# Patient Record
Sex: MALE | Race: WHITE | Employment: OTHER | URBAN - METROPOLITAN AREA
[De-identification: names, ages, dates, MRNs, and addresses within clinical notes are randomized per-mention and may not be internally consistent; named-entity substitution may affect disease eponyms.]

---

## 2017-05-16 ENCOUNTER — OFFICE VISIT (OUTPATIENT)
Dept: INTERNAL MEDICINE CLINIC | Facility: CLINIC | Age: 82
End: 2017-05-16

## 2017-05-16 VITALS
WEIGHT: 169 LBS | SYSTOLIC BLOOD PRESSURE: 125 MMHG | RESPIRATION RATE: 16 BRPM | DIASTOLIC BLOOD PRESSURE: 77 MMHG | BODY MASS INDEX: 23.66 KG/M2 | HEIGHT: 71 IN | HEART RATE: 61 BPM

## 2017-05-16 DIAGNOSIS — E78.2 MIXED HYPERLIPIDEMIA: ICD-10-CM

## 2017-05-16 DIAGNOSIS — E03.9 ACQUIRED HYPOTHYROIDISM: ICD-10-CM

## 2017-05-16 DIAGNOSIS — I10 ESSENTIAL HYPERTENSION WITH GOAL BLOOD PRESSURE LESS THAN 140/90: Primary | ICD-10-CM

## 2017-05-16 PROCEDURE — 99214 OFFICE O/P EST MOD 30 MIN: CPT | Performed by: INTERNAL MEDICINE

## 2017-05-16 PROCEDURE — G0463 HOSPITAL OUTPT CLINIC VISIT: HCPCS | Performed by: INTERNAL MEDICINE

## 2017-05-16 NOTE — PROGRESS NOTES
Lorrie Patel. is a 80year old male. HPI:   1. Essential hypertension with goal <140/90    Patient has been following low salt diet and has been taking anti-hypertensive prescriptions as prescribed.  Blood pressure has been checked and is under good con Pulse 61  Resp 16  Ht 5' 11\" (1.803 m)  Wt 169 lb (76.658 kg)  BMI 23.58 kg/m2  GENERAL: well developed, well nourished,in no apparent distress  SKIN: no rashes,no suspicious lesions/ has seborrheic keratosis  HEENT: atraumatic, normocephalic, throat is c

## 2017-08-29 ENCOUNTER — MED REC SCAN ONLY (OUTPATIENT)
Dept: INTERNAL MEDICINE CLINIC | Facility: CLINIC | Age: 82
End: 2017-08-29

## 2017-09-19 ENCOUNTER — OFFICE VISIT (OUTPATIENT)
Dept: INTERNAL MEDICINE CLINIC | Facility: CLINIC | Age: 82
End: 2017-09-19

## 2017-09-19 VITALS
HEIGHT: 71 IN | RESPIRATION RATE: 16 BRPM | BODY MASS INDEX: 23.66 KG/M2 | DIASTOLIC BLOOD PRESSURE: 79 MMHG | WEIGHT: 169 LBS | SYSTOLIC BLOOD PRESSURE: 125 MMHG | HEART RATE: 59 BPM

## 2017-09-19 DIAGNOSIS — I10 ESSENTIAL HYPERTENSION WITH GOAL BLOOD PRESSURE LESS THAN 140/90: Primary | ICD-10-CM

## 2017-09-19 DIAGNOSIS — E78.2 MIXED HYPERLIPIDEMIA: ICD-10-CM

## 2017-09-19 DIAGNOSIS — Z23 NEED FOR VACCINATION: ICD-10-CM

## 2017-09-19 DIAGNOSIS — E03.9 ACQUIRED HYPOTHYROIDISM: ICD-10-CM

## 2017-09-19 PROCEDURE — 90653 IIV ADJUVANT VACCINE IM: CPT | Performed by: INTERNAL MEDICINE

## 2017-09-19 PROCEDURE — G0463 HOSPITAL OUTPT CLINIC VISIT: HCPCS | Performed by: INTERNAL MEDICINE

## 2017-09-19 PROCEDURE — G0008 ADMIN INFLUENZA VIRUS VAC: HCPCS | Performed by: INTERNAL MEDICINE

## 2017-09-19 PROCEDURE — 99214 OFFICE O/P EST MOD 30 MIN: CPT | Performed by: INTERNAL MEDICINE

## 2017-09-19 NOTE — PROGRESS NOTES
Bostonbetty Renner. is a 80year old male. HPI:   1. Essential hypertension with goal <140/90    Patient has been following low salt diet and has been taking anti-hypertensive prescriptions as prescribed.  Blood pressure has been checked and is under good con headaches    EXAM:   /79 (BP Location: Right arm, Patient Position: Sitting, Cuff Size: large)   Pulse 59   Resp 16   Ht 5' 11\" (1.803 m)   Wt 169 lb (76.7 kg)   BMI 23.57 kg/m²   GENERAL: well developed, well nourished,in no apparent distress  SKIN

## 2017-09-22 ENCOUNTER — LAB ENCOUNTER (OUTPATIENT)
Dept: LAB | Facility: HOSPITAL | Age: 82
End: 2017-09-22
Attending: INTERNAL MEDICINE
Payer: MEDICARE

## 2017-09-22 DIAGNOSIS — E03.9 ACQUIRED HYPOTHYROIDISM: ICD-10-CM

## 2017-09-22 DIAGNOSIS — I10 ESSENTIAL HYPERTENSION WITH GOAL BLOOD PRESSURE LESS THAN 140/90: ICD-10-CM

## 2017-09-22 DIAGNOSIS — E78.2 MIXED HYPERLIPIDEMIA: ICD-10-CM

## 2017-09-22 LAB
ALBUMIN SERPL BCP-MCNC: 3.5 G/DL (ref 3.5–4.8)
ALBUMIN/GLOB SERPL: 1.1 {RATIO} (ref 1–2)
ALP SERPL-CCNC: 63 U/L (ref 32–100)
ALT SERPL-CCNC: 17 U/L (ref 17–63)
ANION GAP SERPL CALC-SCNC: 6 MMOL/L (ref 0–18)
AST SERPL-CCNC: 25 U/L (ref 15–41)
BACTERIA UR QL AUTO: NEGATIVE /HPF
BASOPHILS # BLD: 0 K/UL (ref 0–0.2)
BASOPHILS NFR BLD: 1 %
BILIRUB SERPL-MCNC: 1.1 MG/DL (ref 0.3–1.2)
BILIRUB UR QL: NEGATIVE
BUN SERPL-MCNC: 27 MG/DL (ref 8–20)
BUN/CREAT SERPL: 19 (ref 10–20)
CALCIUM SERPL-MCNC: 9 MG/DL (ref 8.5–10.5)
CHLORIDE SERPL-SCNC: 105 MMOL/L (ref 95–110)
CHOLEST SERPL-MCNC: 130 MG/DL (ref 110–200)
CLARITY UR: CLEAR
CO2 SERPL-SCNC: 28 MMOL/L (ref 22–32)
COLOR UR: YELLOW
CREAT SERPL-MCNC: 1.42 MG/DL (ref 0.5–1.5)
EOSINOPHIL # BLD: 0.4 K/UL (ref 0–0.7)
EOSINOPHIL NFR BLD: 6 %
ERYTHROCYTE [DISTWIDTH] IN BLOOD BY AUTOMATED COUNT: 13.7 % (ref 11–15)
GLOBULIN PLAS-MCNC: 3.1 G/DL (ref 2.5–3.7)
GLUCOSE SERPL-MCNC: 93 MG/DL (ref 70–99)
GLUCOSE UR-MCNC: NEGATIVE MG/DL
HCT VFR BLD AUTO: 37.5 % (ref 41–52)
HDLC SERPL-MCNC: 57 MG/DL
HGB BLD-MCNC: 12.6 G/DL (ref 13.5–17.5)
KETONES UR-MCNC: NEGATIVE MG/DL
LDLC SERPL CALC-MCNC: 63 MG/DL (ref 0–99)
LEUKOCYTE ESTERASE UR QL STRIP.AUTO: NEGATIVE
LYMPHOCYTES # BLD: 1 K/UL (ref 1–4)
LYMPHOCYTES NFR BLD: 13 %
MCH RBC QN AUTO: 30.6 PG (ref 27–32)
MCHC RBC AUTO-ENTMCNC: 33.7 G/DL (ref 32–37)
MCV RBC AUTO: 90.8 FL (ref 80–100)
MONOCYTES # BLD: 0.7 K/UL (ref 0–1)
MONOCYTES NFR BLD: 10 %
NEUTROPHILS # BLD AUTO: 5 K/UL (ref 1.8–7.7)
NEUTROPHILS NFR BLD: 70 %
NITRITE UR QL STRIP.AUTO: NEGATIVE
NONHDLC SERPL-MCNC: 73 MG/DL
OSMOLALITY UR CALC.SUM OF ELEC: 293 MOSM/KG (ref 275–295)
PH UR: 6 [PH] (ref 5–8)
PLATELET # BLD AUTO: 223 K/UL (ref 140–400)
PMV BLD AUTO: 7.7 FL (ref 7.4–10.3)
POTASSIUM SERPL-SCNC: 4 MMOL/L (ref 3.3–5.1)
PROT SERPL-MCNC: 6.6 G/DL (ref 5.9–8.4)
PROT UR-MCNC: NEGATIVE MG/DL
RBC # BLD AUTO: 4.13 M/UL (ref 4.5–5.9)
RBC #/AREA URNS AUTO: 5 /HPF
SODIUM SERPL-SCNC: 139 MMOL/L (ref 136–144)
SP GR UR STRIP: 1.01 (ref 1–1.03)
TRIGL SERPL-MCNC: 52 MG/DL (ref 1–149)
TSH SERPL-ACNC: 3.05 UIU/ML (ref 0.45–5.33)
UROBILINOGEN UR STRIP-ACNC: <2
VIT C UR-MCNC: NEGATIVE MG/DL
WBC # BLD AUTO: 7.2 K/UL (ref 4–11)
WBC #/AREA URNS AUTO: <1 /HPF

## 2017-09-22 PROCEDURE — 85025 COMPLETE CBC W/AUTO DIFF WBC: CPT

## 2017-09-22 PROCEDURE — 80061 LIPID PANEL: CPT

## 2017-09-22 PROCEDURE — 84443 ASSAY THYROID STIM HORMONE: CPT

## 2017-09-22 PROCEDURE — 81001 URINALYSIS AUTO W/SCOPE: CPT

## 2017-09-22 PROCEDURE — 36415 COLL VENOUS BLD VENIPUNCTURE: CPT

## 2017-09-22 PROCEDURE — 80053 COMPREHEN METABOLIC PANEL: CPT

## 2017-09-25 ENCOUNTER — HOSPITAL ENCOUNTER (OUTPATIENT)
Age: 82
Discharge: HOME | End: 2017-09-25
Attending: FAMILY MEDICINE
Payer: MEDICARE

## 2017-09-25 ENCOUNTER — APPOINTMENT (OUTPATIENT)
Dept: GENERAL RADIOLOGY | Age: 82
End: 2017-09-25
Attending: FAMILY MEDICINE
Payer: MEDICARE

## 2017-09-25 VITALS
HEART RATE: 63 BPM | DIASTOLIC BLOOD PRESSURE: 83 MMHG | SYSTOLIC BLOOD PRESSURE: 152 MMHG | TEMPERATURE: 98 F | RESPIRATION RATE: 16 BRPM | OXYGEN SATURATION: 99 % | BODY MASS INDEX: 22 KG/M2 | WEIGHT: 160 LBS

## 2017-09-25 DIAGNOSIS — J30.9 ACUTE ALLERGIC RHINITIS, UNSPECIFIED SEASONALITY, UNSPECIFIED TRIGGER: Primary | ICD-10-CM

## 2017-09-25 PROCEDURE — 99204 OFFICE O/P NEW MOD 45 MIN: CPT

## 2017-09-25 PROCEDURE — 71020 XR CHEST PA + LAT CHEST (CPT=71020): CPT | Performed by: FAMILY MEDICINE

## 2017-09-25 PROCEDURE — 99213 OFFICE O/P EST LOW 20 MIN: CPT

## 2017-09-25 RX ORDER — MONTELUKAST SODIUM 10 MG/1
10 TABLET ORAL NIGHTLY
Qty: 30 TABLET | Refills: 0 | Status: SHIPPED | OUTPATIENT
Start: 2017-09-25 | End: 2017-10-25

## 2017-09-25 NOTE — ED INITIAL ASSESSMENT (HPI)
Cough x 12 days. Was taking OTC cough medicine with some relief. +white phlegm. Denies fever, chills, body aches, shortness of breath or chest pain.

## 2017-09-25 NOTE — ED PROVIDER NOTES
Patient Seen in: [facility] Immediate Care In [address] [place] [address]    History   Patient presents with:  Cough/URI    Stated Complaint: cough    HPI  Pt is an 81 yo who presents with some post nasal drip and clearing of his throat for the past few days.  No fev ear normal.   Left Ear: External ear normal.   Nose: Nose normal.   Mouth/Throat: Oropharynx is clear and moist.   Eyes: Conjunctivae and EOM are normal. Pupils are equal, round, and reactive to light. Neck: Normal range of motion. Neck supple.    Cardiov

## 2017-10-09 DIAGNOSIS — R79.89 ABNORMAL CBC: Primary | ICD-10-CM

## 2017-10-09 DIAGNOSIS — R82.90 ABNORMAL URINALYSIS: ICD-10-CM

## 2017-10-17 RX ORDER — ATORVASTATIN CALCIUM 10 MG/1
10 TABLET, FILM COATED ORAL DAILY
Qty: 90 TABLET | Refills: 1 | Status: SHIPPED | OUTPATIENT
Start: 2017-10-17 | End: 2018-04-11

## 2017-10-17 RX ORDER — AMLODIPINE BESYLATE 5 MG/1
5 TABLET ORAL DAILY
Qty: 90 TABLET | Refills: 1 | Status: SHIPPED | OUTPATIENT
Start: 2017-10-17 | End: 2018-01-24

## 2017-10-25 ENCOUNTER — LAB ENCOUNTER (OUTPATIENT)
Dept: LAB | Facility: HOSPITAL | Age: 82
End: 2017-10-25
Attending: INTERNAL MEDICINE
Payer: MEDICARE

## 2017-10-25 DIAGNOSIS — R82.90 ABNORMAL URINALYSIS: ICD-10-CM

## 2017-10-25 DIAGNOSIS — R79.89 ABNORMAL CBC: ICD-10-CM

## 2017-10-25 PROCEDURE — 81001 URINALYSIS AUTO W/SCOPE: CPT

## 2017-10-25 PROCEDURE — 85025 COMPLETE CBC W/AUTO DIFF WBC: CPT

## 2017-10-25 PROCEDURE — 36415 COLL VENOUS BLD VENIPUNCTURE: CPT

## 2017-10-27 RX ORDER — MONTELUKAST SODIUM 10 MG/1
TABLET ORAL
Qty: 30 TABLET | Refills: 0 | Status: SHIPPED | OUTPATIENT
Start: 2017-10-27 | End: 2018-10-03

## 2017-11-02 ENCOUNTER — TELEPHONE (OUTPATIENT)
Dept: OTHER | Age: 82
End: 2017-11-02

## 2017-11-02 NOTE — TELEPHONE ENCOUNTER
Status:  Final result   Visible to patient:  Yes (MyChart) Dx:  Abnormal urinalysis   Notes Recorded by Bennett Plaza MD on 10/31/2017 at 2:15 PM CDT  Mild anemia persists. Urine still has a few extra red cells in it.  Call and ask the patient 2 thin

## 2017-11-06 NOTE — TELEPHONE ENCOUNTER
Reviewed doctor's recommendations with pt, pt agreed with doctor's recommendations and will call back if he has any further questions.

## 2017-11-28 RX ORDER — LEVOTHYROXINE SODIUM 0.1 MG/1
100 TABLET ORAL
Qty: 90 TABLET | Refills: 0 | Status: SHIPPED | OUTPATIENT
Start: 2017-11-28 | End: 2018-02-24

## 2017-11-28 NOTE — TELEPHONE ENCOUNTER
Refilled per protocol   Hypothyroid Medications  Protocol Criteria:  Appointment scheduled in the past 12 months or the next 3 months  TSH resulted in the past 12 months that is normal  Recent Outpatient Visits            2 months ago Essential hypertensio

## 2018-01-10 ENCOUNTER — APPOINTMENT (OUTPATIENT)
Dept: GENERAL RADIOLOGY | Age: 83
End: 2018-01-10
Attending: EMERGENCY MEDICINE
Payer: MEDICARE

## 2018-01-10 ENCOUNTER — HOSPITAL ENCOUNTER (OUTPATIENT)
Age: 83
Discharge: HOME | End: 2018-01-10
Attending: EMERGENCY MEDICINE
Payer: MEDICARE

## 2018-01-10 VITALS
OXYGEN SATURATION: 99 % | HEART RATE: 66 BPM | TEMPERATURE: 97 F | DIASTOLIC BLOOD PRESSURE: 75 MMHG | RESPIRATION RATE: 16 BRPM | BODY MASS INDEX: 22 KG/M2 | SYSTOLIC BLOOD PRESSURE: 128 MMHG | WEIGHT: 160 LBS

## 2018-01-10 DIAGNOSIS — J11.1 INFLUENZA: Primary | ICD-10-CM

## 2018-01-10 LAB
FLUAV + FLUBV RNA SPEC NAA+PROBE: NEGATIVE
FLUAV + FLUBV RNA SPEC NAA+PROBE: NEGATIVE
FLUAV + FLUBV RNA SPEC NAA+PROBE: POSITIVE

## 2018-01-10 PROCEDURE — 87631 RESP VIRUS 3-5 TARGETS: CPT | Performed by: EMERGENCY MEDICINE

## 2018-01-10 PROCEDURE — 99213 OFFICE O/P EST LOW 20 MIN: CPT

## 2018-01-10 PROCEDURE — 99214 OFFICE O/P EST MOD 30 MIN: CPT

## 2018-01-10 PROCEDURE — 71046 X-RAY EXAM CHEST 2 VIEWS: CPT | Performed by: EMERGENCY MEDICINE

## 2018-01-10 RX ORDER — ALBUTEROL SULFATE 90 UG/1
2 AEROSOL, METERED RESPIRATORY (INHALATION) EVERY 4 HOURS PRN
Qty: 1 INHALER | Refills: 0 | Status: SHIPPED | OUTPATIENT
Start: 2018-01-10 | End: 2018-10-03

## 2018-01-10 RX ORDER — OSELTAMIVIR PHOSPHATE 75 MG/1
75 CAPSULE ORAL 2 TIMES DAILY
Qty: 10 CAPSULE | Refills: 0 | Status: SHIPPED | OUTPATIENT
Start: 2018-01-10 | End: 2018-01-15

## 2018-01-10 NOTE — ED PROVIDER NOTES
Patient Seen in: [facility] Immediate Care In [address] [place] [address]    History   Patient presents with:  Cough/URI    Stated Complaint: cough    HPI  Patient states he has had cough for 2 days runny nose, scratchy throat and a postnasal drip.   He started with air)    Current:/75   Pulse 66   Temp (!) 97.2 °F (36.2 °C) (Oral)   Resp 16   Wt 72.6 kg   SpO2 99%   BMI 22.32 kg/m²         Physical Exam   Constitutional: He is oriented to person, place, and time. He appears well-developed and well-nourished.  No without hesitation.         Disposition and Plan     Clinical Impression:  Influenza  (primary encounter diagnosis)    Disposition:  Discharge  1/10/2018 10:27 am    Follow-up:  Felicitas Dc MD  [address] [facility]  [phone]

## 2018-01-17 ENCOUNTER — OFFICE VISIT (OUTPATIENT)
Dept: INTERNAL MEDICINE CLINIC | Facility: CLINIC | Age: 83
End: 2018-01-17

## 2018-01-17 VITALS
BODY MASS INDEX: 22 KG/M2 | HEART RATE: 86 BPM | DIASTOLIC BLOOD PRESSURE: 72 MMHG | RESPIRATION RATE: 16 BRPM | WEIGHT: 160 LBS | SYSTOLIC BLOOD PRESSURE: 136 MMHG

## 2018-01-17 DIAGNOSIS — E78.2 MIXED HYPERLIPIDEMIA: ICD-10-CM

## 2018-01-17 DIAGNOSIS — E03.9 ACQUIRED HYPOTHYROIDISM: ICD-10-CM

## 2018-01-17 DIAGNOSIS — J45.41 MODERATE PERSISTENT ASTHMATIC BRONCHITIS WITH ACUTE EXACERBATION: Primary | ICD-10-CM

## 2018-01-17 DIAGNOSIS — I10 ESSENTIAL HYPERTENSION WITH GOAL BLOOD PRESSURE LESS THAN 140/90: ICD-10-CM

## 2018-01-17 PROBLEM — J45.909 ASTHMATIC BRONCHITIS: Status: ACTIVE | Noted: 2018-01-17

## 2018-01-17 PROBLEM — J45.909 ASTHMATIC BRONCHITIS (HCC): Status: ACTIVE | Noted: 2018-01-17

## 2018-01-17 PROCEDURE — 94640 AIRWAY INHALATION TREATMENT: CPT | Performed by: INTERNAL MEDICINE

## 2018-01-17 PROCEDURE — 99214 OFFICE O/P EST MOD 30 MIN: CPT | Performed by: INTERNAL MEDICINE

## 2018-01-17 PROCEDURE — G0463 HOSPITAL OUTPT CLINIC VISIT: HCPCS | Performed by: INTERNAL MEDICINE

## 2018-01-17 RX ORDER — BENZONATATE 100 MG/1
100 CAPSULE ORAL 3 TIMES DAILY PRN
Qty: 30 CAPSULE | Refills: 1 | Status: SHIPPED | OUTPATIENT
Start: 2018-01-17 | End: 2018-10-03

## 2018-01-17 RX ORDER — ALBUTEROL SULFATE 2.5 MG/3ML
2.5 SOLUTION RESPIRATORY (INHALATION) EVERY 4 HOURS PRN
Qty: 30 BOTTLE | Refills: 1 | Status: SHIPPED | OUTPATIENT
Start: 2018-01-17 | End: 2018-10-03

## 2018-01-17 RX ORDER — GUAIFENESIN AND CODEINE PHOSPHATE 100; 10 MG/5ML; MG/5ML
5 SOLUTION ORAL 4 TIMES DAILY PRN
Qty: 180 ML | Refills: 0 | Status: SHIPPED | OUTPATIENT
Start: 2018-01-17 | End: 2018-01-31

## 2018-01-17 RX ORDER — ALBUTEROL SULFATE 2.5 MG/3ML
2.5 SOLUTION RESPIRATORY (INHALATION) ONCE
Status: COMPLETED | OUTPATIENT
Start: 2018-01-17 | End: 2018-01-17

## 2018-01-17 RX ORDER — PREDNISONE 10 MG/1
TABLET ORAL
Qty: 20 TABLET | Refills: 0 | Status: SHIPPED | OUTPATIENT
Start: 2018-01-17 | End: 2018-10-03

## 2018-01-17 RX ADMIN — ALBUTEROL SULFATE 2.5 MG: 2.5 SOLUTION RESPIRATORY (INHALATION) at 14:06:00

## 2018-01-17 NOTE — PROGRESS NOTES
Rickie Wood. is a 80year old male. HPI:     1. 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 Went to urgent care on 1/8/2018 and was given an antibiotic doxycycline and albuterol 2 puffs QID.  St Hypothyroid 2010   • Inguinal hernia    • Palpitations 2001    toprol   • Perforated ear drum    • Prostate cancer ([facility] [address] 2007    seed implant      Social History:  Smoking status: Never Smoker                                                              Sm pressures at home and bring readings to your next office visit to review. 3. Mixed hyperlipidemia    Patient instructed to take cholesterol lowering medications as prescribed and to continue to follow a low fat, low cholesterol diet as discussed.  Discus

## 2018-01-19 ENCOUNTER — PATIENT OUTREACH (OUTPATIENT)
Dept: CASE MANAGEMENT | Age: 83
End: 2018-01-19

## 2018-01-19 NOTE — PROGRESS NOTES
Outreached to patient in regards to enrollment to Chronic Care Management program. Left message for patient to return my call at ext. [phone]. Thank you.

## 2018-01-24 RX ORDER — AMLODIPINE BESYLATE 5 MG/1
5 TABLET ORAL DAILY
Qty: 90 TABLET | Refills: 1 | Status: SHIPPED | OUTPATIENT
Start: 2018-01-24 | End: 2018-10-03

## 2018-02-24 RX ORDER — LEVOTHYROXINE SODIUM 0.1 MG/1
100 TABLET ORAL
Qty: 90 TABLET | Refills: 0 | Status: SHIPPED | OUTPATIENT
Start: 2018-02-24 | End: 2018-05-21

## 2018-04-11 RX ORDER — ATORVASTATIN CALCIUM 10 MG/1
10 TABLET, FILM COATED ORAL DAILY
Qty: 90 TABLET | Refills: 1 | Status: SHIPPED | OUTPATIENT
Start: 2018-04-11 | End: 2018-10-03

## 2018-05-21 RX ORDER — LEVOTHYROXINE SODIUM 0.1 MG/1
100 TABLET ORAL
Qty: 90 TABLET | Refills: 0 | Status: SHIPPED | OUTPATIENT
Start: 2018-05-21 | End: 2018-08-03

## 2018-05-23 ENCOUNTER — TELEPHONE (OUTPATIENT)
Dept: INTERNAL MEDICINE CLINIC | Facility: CLINIC | Age: 83
End: 2018-05-23

## 2018-05-23 DIAGNOSIS — I10 ESSENTIAL HYPERTENSION: Primary | ICD-10-CM

## 2018-05-23 NOTE — TELEPHONE ENCOUNTER
Pt would like to schedule his pneumonia vaccine and any other blood work doctor would like him to have.

## 2018-06-04 ENCOUNTER — LAB ENCOUNTER (OUTPATIENT)
Dept: LAB | Facility: HOSPITAL | Age: 83
End: 2018-06-04
Attending: INTERNAL MEDICINE
Payer: MEDICARE

## 2018-06-04 DIAGNOSIS — I10 ESSENTIAL HYPERTENSION: ICD-10-CM

## 2018-06-04 PROCEDURE — 36415 COLL VENOUS BLD VENIPUNCTURE: CPT

## 2018-06-04 PROCEDURE — 85025 COMPLETE CBC W/AUTO DIFF WBC: CPT

## 2018-06-04 PROCEDURE — 80048 BASIC METABOLIC PNL TOTAL CA: CPT

## 2018-06-26 ENCOUNTER — NURSE ONLY (OUTPATIENT)
Dept: INTERNAL MEDICINE CLINIC | Facility: CLINIC | Age: 83
End: 2018-06-26

## 2018-06-26 DIAGNOSIS — Z23 NEED FOR VACCINATION: Primary | ICD-10-CM

## 2018-06-26 PROCEDURE — 90670 PCV13 VACCINE IM: CPT | Performed by: INTERNAL MEDICINE

## 2018-06-26 PROCEDURE — G0009 ADMIN PNEUMOCOCCAL VACCINE: HCPCS | Performed by: INTERNAL MEDICINE

## 2018-06-26 NOTE — PROGRESS NOTES
Pt presents for PCV 13 injection . Name and  verified . PVC 13 given on left deltoid . Order verified on 18 encounter.

## 2018-07-27 ENCOUNTER — TELEPHONE (OUTPATIENT)
Dept: INTERNAL MEDICINE CLINIC | Facility: CLINIC | Age: 83
End: 2018-07-27

## 2018-07-27 NOTE — TELEPHONE ENCOUNTER
Patient has Pneumonia shot on 06/26/2018 and was advised needed booster in 1 yr but Mychart states in August.       Patient needs to confirm the date.        Please advise

## 2018-08-03 RX ORDER — LEVOTHYROXINE SODIUM 0.1 MG/1
100 TABLET ORAL
Qty: 90 TABLET | Refills: 0 | Status: SHIPPED | OUTPATIENT
Start: 2018-08-03 | End: 2018-10-03

## 2018-08-03 NOTE — TELEPHONE ENCOUNTER
Thyroid Medication Protocol Passed8/3 11:17 AM   TSH in past 12 months    Last TSH value is normal    Appointment in past 12 or next 3 months     Medication refilled for 90 days per protocol.

## 2018-10-03 ENCOUNTER — OFFICE VISIT (OUTPATIENT)
Dept: INTERNAL MEDICINE CLINIC | Facility: CLINIC | Age: 83
End: 2018-10-03
Payer: MEDICARE

## 2018-10-03 VITALS
WEIGHT: 167 LBS | SYSTOLIC BLOOD PRESSURE: 138 MMHG | HEIGHT: 71 IN | HEART RATE: 69 BPM | BODY MASS INDEX: 23.38 KG/M2 | RESPIRATION RATE: 16 BRPM | DIASTOLIC BLOOD PRESSURE: 77 MMHG

## 2018-10-03 DIAGNOSIS — Z23 NEED FOR VACCINATION: ICD-10-CM

## 2018-10-03 DIAGNOSIS — I10 ESSENTIAL HYPERTENSION WITH GOAL BLOOD PRESSURE LESS THAN 140/90: Primary | ICD-10-CM

## 2018-10-03 DIAGNOSIS — E78.2 MIXED HYPERLIPIDEMIA: ICD-10-CM

## 2018-10-03 DIAGNOSIS — E03.9 ACQUIRED HYPOTHYROIDISM: ICD-10-CM

## 2018-10-03 PROCEDURE — G0463 HOSPITAL OUTPT CLINIC VISIT: HCPCS | Performed by: INTERNAL MEDICINE

## 2018-10-03 PROCEDURE — G0008 ADMIN INFLUENZA VIRUS VAC: HCPCS | Performed by: INTERNAL MEDICINE

## 2018-10-03 PROCEDURE — 90653 IIV ADJUVANT VACCINE IM: CPT | Performed by: INTERNAL MEDICINE

## 2018-10-03 PROCEDURE — 99214 OFFICE O/P EST MOD 30 MIN: CPT | Performed by: INTERNAL MEDICINE

## 2018-10-03 RX ORDER — ATORVASTATIN CALCIUM 10 MG/1
10 TABLET, FILM COATED ORAL DAILY
Qty: 90 TABLET | Refills: 3 | Status: SHIPPED | OUTPATIENT
Start: 2018-10-03 | End: 2019-09-24

## 2018-10-03 RX ORDER — LEVOTHYROXINE SODIUM 0.1 MG/1
100 TABLET ORAL
Qty: 90 TABLET | Refills: 3 | Status: SHIPPED | OUTPATIENT
Start: 2018-10-03 | End: 2019-07-09

## 2018-10-03 RX ORDER — AMLODIPINE BESYLATE 5 MG/1
5 TABLET ORAL DAILY
Qty: 90 TABLET | Refills: 3 | Status: ON HOLD | OUTPATIENT
Start: 2018-10-03 | End: 2018-12-17

## 2018-10-03 NOTE — PROGRESS NOTES
Gabrielakelsey Slaughter. is a 80year old male. HPI:   1. Essential hypertension with goal <140/90    Patient has been following low salt diet and has been taking anti-hypertensive prescriptions as prescribed.  Blood pressure has been checked and is under good con denies headaches    EXAM:   /77   Pulse 69   Resp 16   Ht 5' 11\" (1.803 m)   Wt 167 lb (75.8 kg)   BMI 23.29 kg/m²   GENERAL: well developed, well nourished,in no apparent distress  SKIN: no rashes,no suspicious lesions/ has seborrheic keratosis  HE

## 2018-12-17 ENCOUNTER — APPOINTMENT (OUTPATIENT)
Dept: CV DIAGNOSTICS | Facility: HOSPITAL | Age: 83
End: 2018-12-17
Attending: HOSPITALIST
Payer: MEDICARE

## 2018-12-17 ENCOUNTER — HOSPITAL ENCOUNTER (OUTPATIENT)
Facility: HOSPITAL | Age: 83
Setting detail: OBSERVATION
LOS: 1 days | Discharge: HOME | End: 2018-12-17
Attending: EMERGENCY MEDICINE | Admitting: HOSPITALIST
Payer: MEDICARE

## 2018-12-17 VITALS
WEIGHT: 162.69 LBS | DIASTOLIC BLOOD PRESSURE: 88 MMHG | HEIGHT: 71 IN | SYSTOLIC BLOOD PRESSURE: 149 MMHG | TEMPERATURE: 98 F | OXYGEN SATURATION: 98 % | RESPIRATION RATE: 16 BRPM | BODY MASS INDEX: 22.77 KG/M2 | HEART RATE: 65 BPM

## 2018-12-17 DIAGNOSIS — I48.91 ATRIAL FIBRILLATION WITH RAPID VENTRICULAR RESPONSE (HCC): Primary | ICD-10-CM

## 2018-12-17 LAB
ANION GAP SERPL CALC-SCNC: 8 MMOL/L (ref 0–18)
BASOPHILS # BLD: 0 K/UL (ref 0–0.2)
BASOPHILS NFR BLD: 1 %
BUN SERPL-MCNC: 29 MG/DL (ref 8–20)
BUN/CREAT SERPL: 19.1 (ref 10–20)
CALCIUM SERPL-MCNC: 9.9 MG/DL (ref 8.5–10.5)
CHLORIDE SERPL-SCNC: 107 MMOL/L (ref 95–110)
CO2 SERPL-SCNC: 24 MMOL/L (ref 22–32)
CREAT SERPL-MCNC: 1.52 MG/DL (ref 0.5–1.5)
EOSINOPHIL # BLD: 0.5 K/UL (ref 0–0.7)
EOSINOPHIL NFR BLD: 8 %
ERYTHROCYTE [DISTWIDTH] IN BLOOD BY AUTOMATED COUNT: 14.1 % (ref 11–15)
GLUCOSE SERPL-MCNC: 104 MG/DL (ref 70–99)
HCT VFR BLD AUTO: 41.2 % (ref 41–52)
HGB BLD-MCNC: 13.6 G/DL (ref 13.5–17.5)
LYMPHOCYTES # BLD: 1.6 K/UL (ref 1–4)
LYMPHOCYTES NFR BLD: 25 %
MAGNESIUM SERPL-MCNC: 2 MG/DL (ref 1.8–2.5)
MCH RBC QN AUTO: 30.2 PG (ref 27–32)
MCHC RBC AUTO-ENTMCNC: 33 G/DL (ref 32–37)
MCV RBC AUTO: 91.3 FL (ref 80–100)
MONOCYTES # BLD: 0.8 K/UL (ref 0–1)
MONOCYTES NFR BLD: 13 %
NEUTROPHILS # BLD AUTO: 3.5 K/UL (ref 1.8–7.7)
NEUTROPHILS NFR BLD: 54 %
OSMOLALITY UR CALC.SUM OF ELEC: 294 MOSM/KG (ref 275–295)
PLATELET # BLD AUTO: 236 K/UL (ref 140–400)
PMV BLD AUTO: 8.4 FL (ref 7.4–10.3)
POTASSIUM SERPL-SCNC: 3.6 MMOL/L (ref 3.3–5.1)
RBC # BLD AUTO: 4.51 M/UL (ref 4.5–5.9)
SODIUM SERPL-SCNC: 139 MMOL/L (ref 136–144)
T4 FREE SERPL-MCNC: 0.96 NG/DL (ref 0.58–1.64)
TROPONIN I SERPL-MCNC: 0.02 NG/ML (ref ?–0.03)
TSH SERPL-ACNC: 6.75 UIU/ML (ref 0.45–5.33)
WBC # BLD AUTO: 6.4 K/UL (ref 4–11)

## 2018-12-17 PROCEDURE — 99220 INITIAL OBSERVATION CARE,LEVL III: CPT | Performed by: HOSPITALIST

## 2018-12-17 PROCEDURE — 93306 TTE W/DOPPLER COMPLETE: CPT | Performed by: HOSPITALIST

## 2018-12-17 RX ORDER — POTASSIUM CHLORIDE 20 MEQ/1
40 TABLET, EXTENDED RELEASE ORAL ONCE
Status: COMPLETED | OUTPATIENT
Start: 2018-12-17 | End: 2018-12-17

## 2018-12-17 RX ORDER — ATORVASTATIN CALCIUM 10 MG/1
10 TABLET, FILM COATED ORAL NIGHTLY
Status: DISCONTINUED | OUTPATIENT
Start: 2018-12-17 | End: 2018-12-17

## 2018-12-17 RX ORDER — BUPRENORPHINE HCL 8 MG/1
1 TABLET SUBLINGUAL DAILY
COMMUNITY

## 2018-12-17 RX ORDER — NITROGLYCERIN 0.4 MG/1
0.4 TABLET SUBLINGUAL EVERY 5 MIN PRN
Status: DISCONTINUED | OUTPATIENT
Start: 2018-12-17 | End: 2018-12-17

## 2018-12-17 RX ORDER — DOCUSATE SODIUM 100 MG/1
100 CAPSULE, LIQUID FILLED ORAL 2 TIMES DAILY
Status: DISCONTINUED | OUTPATIENT
Start: 2018-12-17 | End: 2018-12-17

## 2018-12-17 RX ORDER — HYDROCODONE BITARTRATE AND ACETAMINOPHEN 5; 325 MG/1; MG/1
2 TABLET ORAL EVERY 4 HOURS PRN
Status: DISCONTINUED | OUTPATIENT
Start: 2018-12-17 | End: 2018-12-17

## 2018-12-17 RX ORDER — HYDROCODONE BITARTRATE AND ACETAMINOPHEN 5; 325 MG/1; MG/1
1 TABLET ORAL EVERY 4 HOURS PRN
Status: DISCONTINUED | OUTPATIENT
Start: 2018-12-17 | End: 2018-12-17

## 2018-12-17 RX ORDER — POLYETHYLENE GLYCOL 3350 17 G/17G
17 POWDER, FOR SOLUTION ORAL DAILY PRN
Status: DISCONTINUED | OUTPATIENT
Start: 2018-12-17 | End: 2018-12-17

## 2018-12-17 RX ORDER — MULTIPLE VITAMINS W/ MINERALS TAB 9MG-400MCG
1 TAB ORAL DAILY
Status: DISCONTINUED | OUTPATIENT
Start: 2018-12-17 | End: 2018-12-17

## 2018-12-17 RX ORDER — SODIUM CHLORIDE 9 MG/ML
INJECTION, SOLUTION INTRAVENOUS ONCE
Status: COMPLETED | OUTPATIENT
Start: 2018-12-17 | End: 2018-12-17

## 2018-12-17 RX ORDER — DILTIAZEM HYDROCHLORIDE 120 MG/1
120 CAPSULE, COATED, EXTENDED RELEASE ORAL DAILY
Qty: 30 CAPSULE | Refills: 1 | Status: SHIPPED | OUTPATIENT
Start: 2018-12-18

## 2018-12-17 RX ORDER — ATORVASTATIN CALCIUM 10 MG/1
10 TABLET, FILM COATED ORAL DAILY
Status: DISCONTINUED | OUTPATIENT
Start: 2018-12-17 | End: 2018-12-17

## 2018-12-17 RX ORDER — ONDANSETRON 2 MG/ML
4 INJECTION INTRAMUSCULAR; INTRAVENOUS EVERY 6 HOURS PRN
Status: DISCONTINUED | OUTPATIENT
Start: 2018-12-17 | End: 2018-12-17

## 2018-12-17 RX ORDER — ACETAMINOPHEN 325 MG/1
650 TABLET ORAL EVERY 4 HOURS PRN
Status: DISCONTINUED | OUTPATIENT
Start: 2018-12-17 | End: 2018-12-17

## 2018-12-17 RX ORDER — DILTIAZEM HYDROCHLORIDE 5 MG/ML
10 INJECTION INTRAVENOUS ONCE
Status: COMPLETED | OUTPATIENT
Start: 2018-12-17 | End: 2018-12-17

## 2018-12-17 RX ORDER — DILTIAZEM HYDROCHLORIDE 120 MG/1
120 CAPSULE, COATED, EXTENDED RELEASE ORAL DAILY
Status: DISCONTINUED | OUTPATIENT
Start: 2018-12-18 | End: 2018-12-17

## 2018-12-17 RX ORDER — ASPIRIN 81 MG/1
81 TABLET, CHEWABLE ORAL NIGHTLY
Status: ON HOLD | COMMUNITY
End: 2018-12-17

## 2018-12-17 RX ORDER — BISACODYL 10 MG
10 SUPPOSITORY, RECTAL RECTAL
Status: DISCONTINUED | OUTPATIENT
Start: 2018-12-17 | End: 2018-12-17

## 2018-12-17 RX ORDER — SODIUM CHLORIDE 0.9 % (FLUSH) 0.9 %
3 SYRINGE (ML) INJECTION AS NEEDED
Status: DISCONTINUED | OUTPATIENT
Start: 2018-12-17 | End: 2018-12-17

## 2018-12-17 RX ORDER — ASPIRIN 81 MG/1
81 TABLET, CHEWABLE ORAL NIGHTLY
Status: DISCONTINUED | OUTPATIENT
Start: 2018-12-17 | End: 2018-12-17

## 2018-12-17 RX ORDER — METOCLOPRAMIDE HYDROCHLORIDE 5 MG/ML
10 INJECTION INTRAMUSCULAR; INTRAVENOUS EVERY 8 HOURS PRN
Status: DISCONTINUED | OUTPATIENT
Start: 2018-12-17 | End: 2018-12-17

## 2018-12-17 RX ORDER — HEPARIN SODIUM 5000 [USP'U]/ML
5000 INJECTION, SOLUTION INTRAVENOUS; SUBCUTANEOUS EVERY 12 HOURS SCHEDULED
Status: DISCONTINUED | OUTPATIENT
Start: 2018-12-17 | End: 2018-12-17

## 2018-12-17 RX ORDER — LEVOTHYROXINE SODIUM 0.1 MG/1
100 TABLET ORAL
Status: DISCONTINUED | OUTPATIENT
Start: 2018-12-17 | End: 2018-12-17

## 2018-12-17 NOTE — H&P
[facility].  Patient Status:  Inpatient    1928 MRN [id number]   Location [facility] 3W/SW Attending Faby Judge MD   Hosp Day # 0 PCP May Caban MD     Date:  2018 ANTIBIOTICS)    Medications Prior to Admission:  aspirin 81 MG Oral Chew Tab Chew 81 mg by mouth nightly. Multiple Vitamins-Minerals (CENTRUM SILVER) Oral Tab Take 1 tablet by mouth daily.    AmLODIPine Besylate 5 MG Oral Tab Take 1 tablet (5 mg total) 1.1 09/22/2017    TP 6.6 09/22/2017    AST 25 09/22/2017    ALT 17 09/22/2017    T4F 0.96 12/17/2018    TSH 6.75 (H) 12/17/2018    MG 2.0 12/17/2018           Ekg 12-lead    Result Date: 12/17/2018  ECG Report  Interpretation  --------------------------

## 2018-12-17 NOTE — ED PROVIDER NOTES
Patient Seen in: [facility] AND [facility] Emergency Department    History   Patient presents with:  Arrythmia/Palpitations (cardiovascular)    Stated Complaint: heart racing     HPI    81 yo M with PMH palpitations on amlodipine, hypothyroid presenting fo [facility] Cardiovascular: Negative for chest pain; (+) palpitations. Gastrointestinal: Negative for vomiting and abdominal pain. Positive for stated complaint: heart racing  Other systems are as noted in HPI. Constitutional and vital signs reviewed.       Al repleted. Brief periods of rate control achieved after IV diltiazem x2 though with RVR recurrence for which gtt initiated and patient to be admitted.  PCP Dr. Drew Box, graciously admitted to Dr. Joel Ward; [facility] Dr. Prashanth Hopkins consulted with recs appreciated and an

## 2018-12-17 NOTE — HISTORICAL OFFICE NOTE
Meagan Bloodgood  : 1928  ACCOUNT:  [id number]  [phone]  PCP: Dr. Lang Bending     TODAY'S DATE: 2018  DICTATED BY:  [Dr. Naren Aquino: [Followup of Syncope.]    HPI:    [On 2018, Luz Marina Johnson, a 80year old male, 23.3 ]    CONS: well developed, well nourished. WEIGHT: BMI parameters reviewed and discussed. HEAD/FACE: no trauma and normocephalic. EYES: conjunctivae not injected and no xanthelasma.  ENT: mucosa pink and moist. NECK: jugular venous pressure not elevate

## 2018-12-17 NOTE — PROGRESS NOTES
Post midnight follow up note. Patient was seen and examined. No complaints at present.       Atrial fibrillation with rapid ventricular response ([address]  -new onset, now in nsr  -monitor on tele  - EKG, reviewed  -plan echocardiogram, pending  -plan cardiolo

## 2018-12-17 NOTE — CONSULTS
[facility] AND [facility]  [facility] Cardiology Consult Note 2018    Becca Harden.  Patient Status:  Inpatient    1928 MRN [id number]   Location [facility] 3W/SW Attending Leonardo Ruano MD   Hosp Day # 0 PCP Suzy Murillo MD     80 y amlodipine with addition of diltiazem - watch bp at home and afib clinic  - titrate diltiazem outpt as needed  - starting low dose with hx of presyncope    Aortic dilation on echo  - noted to have ascending aortic dilation at 4.2cm  -no need to repeat imag

## 2018-12-17 NOTE — CM/SW NOTE
Explanation of of BPCI/Medicare program provided. Patient was enrolled under . BPCI/Medicare letter and brochure provided.   Farheen Magallon RN, CTL

## 2018-12-18 ENCOUNTER — PATIENT OUTREACH (OUTPATIENT)
Dept: CASE MANAGEMENT | Age: 83
End: 2018-12-18

## 2018-12-18 DIAGNOSIS — Z02.9 ENCOUNTERS FOR ADMINISTRATIVE PURPOSE: ICD-10-CM

## 2018-12-18 DIAGNOSIS — I48.91 ATRIAL FIBRILLATION WITH RAPID VENTRICULAR RESPONSE (HCC): ICD-10-CM

## 2018-12-18 PROCEDURE — 1111F DSCHRG MED/CURRENT MED MERGE: CPT

## 2018-12-18 NOTE — PROGRESS NOTES
Attempted to contact the patient for TCM, busy signal x2. NCM will try again at a different time.  TCM BB 12-

## 2018-12-19 ENCOUNTER — TELEPHONE (OUTPATIENT)
Dept: CARDIOLOGY UNIT | Facility: HOSPITAL | Age: 83
End: 2018-12-19

## 2018-12-20 ENCOUNTER — TELEPHONE (OUTPATIENT)
Dept: INTERNAL MEDICINE CLINIC | Facility: CLINIC | Age: 83
End: 2018-12-20

## 2018-12-20 NOTE — TELEPHONE ENCOUNTER
I would think it would be prudent  for him to contact the cardiologist regarding this situation.  since they ordered the meds, treated him and know what happened in the hospital.

## 2018-12-20 NOTE — TELEPHONE ENCOUNTER
Spoke with patient and informed him of Dr. Shantelle Camp's message. Patient was provided with the phone number to Dr. Prosper Hu (cardiology office). Patient voiced understanding and agreed with the plan of care and stated he will call Dr. Oneyda Guerrero.

## 2018-12-20 NOTE — PROGRESS NOTES
Initial Post Discharge Follow Up   Discharge Date: 12/17/18  Contact Date: 12/18/2018    Consent Verification:  Assessment Completed With: Patient  HIPAA Verified?   Yes    Discharge Dx:   AFIB with RVR    General:   • How have you been since your dischar • When you were leaving the hospital were any medication changes discussed with you? yes  • If you were prescribed a new medication:   o Was the new medication’s purpose explained? yes  o Was the new medication’s side effects discussed?  yes  o Do you h and general \"not well\" feeling. [facility] also included an appt request in this TE. NCM reviewed medications and there were no further questions or concerns.      BOOK BY DATE:12/31/2018    [x]  Discharge Summary, Discharge medications reviewed/discussed/and re

## 2018-12-20 NOTE — TELEPHONE ENCOUNTER
Patient dc'd from [facility] 12/17, AFIB w/ RVR. States that his blood pressures were good when he got home on 12/17 and 12/18. Then on 12/19 at night it went up to 172/91 p-63. He then woke up at 5:30 with palpitations and a general \"not well\" feeling.  Took hi

## 2018-12-20 NOTE — TELEPHONE ENCOUNTER
Message was routed to Dr. Armando Parisi for review. Follow-up appointment was scheduled for 12/26/18.      Please reply to pool: PAULO Dorsey

## 2018-12-26 ENCOUNTER — OFFICE VISIT (OUTPATIENT)
Dept: INTERNAL MEDICINE CLINIC | Facility: CLINIC | Age: 83
End: 2018-12-26
Payer: MEDICARE

## 2018-12-26 VITALS
RESPIRATION RATE: 16 BRPM | BODY MASS INDEX: 24 KG/M2 | DIASTOLIC BLOOD PRESSURE: 74 MMHG | WEIGHT: 173 LBS | SYSTOLIC BLOOD PRESSURE: 142 MMHG | HEART RATE: 71 BPM

## 2018-12-26 DIAGNOSIS — E78.2 MIXED HYPERLIPIDEMIA: ICD-10-CM

## 2018-12-26 DIAGNOSIS — I10 ESSENTIAL HYPERTENSION WITH GOAL BLOOD PRESSURE LESS THAN 140/90: ICD-10-CM

## 2018-12-26 DIAGNOSIS — I48.0 PAROXYSMAL ATRIAL FIBRILLATION (HCC): Primary | ICD-10-CM

## 2018-12-26 PROCEDURE — 1111F DSCHRG MED/CURRENT MED MERGE: CPT | Performed by: INTERNAL MEDICINE

## 2018-12-26 PROCEDURE — G0463 HOSPITAL OUTPT CLINIC VISIT: HCPCS | Performed by: INTERNAL MEDICINE

## 2018-12-26 PROCEDURE — 99214 OFFICE O/P EST MOD 30 MIN: CPT | Performed by: INTERNAL MEDICINE

## 2018-12-26 RX ORDER — AMLODIPINE BESYLATE 5 MG/1
5 TABLET ORAL NIGHTLY
COMMUNITY
End: 2019-07-09

## 2018-12-26 NOTE — PROGRESS NOTES
[facility] Zulma Schwartz.  Patient Status:  No patient class for patient encounter    1928 MRN [id number]   Location MD Elzbieta Mills MD Jolaine Lange. is a 80 12/27/2018 09:54 AM    CA 9.4 12/27/2018 09:54 AM    ALB 3.5 09/22/2017 07:28 AM    ALKPHO 63 09/22/2017 07:28 AM    BILT 1.1 09/22/2017 07:28 AM    TP 6.6 09/22/2017 07:28 AM    AST 25 09/22/2017 07:28 AM    ALT 17 09/22/2017 07:28 AM    T4F 0.96 12/17/20 dizziness, lightheadedness or near syncope  -orthostatic blood pressures are normal    At risk for sleep apnea  -consider sleep study testing    HTN  - home blood pressure 119//70's  - bp normal back on amlodipine 5 mg at bedtime      Plan:     Gavin

## 2018-12-26 NOTE — PROGRESS NOTES
Bostonbetty Renner. is a 80year old male. HPI:   1. Atrial Fibrillation    Went into atrial fibrillation and went to ER where medicine was given and he converted to sinus rhythm after several hours.  Had seen Dr Bettie Esteban in the hospital and is on cardizem 120 S 2010    per N minutes in [place]   • Hypothyroid 2010   • Inguinal hernia    • Palpitations 2001    toprol   • Perforated ear drum    • Prostate cancer ([facility] [address] 2007    seed implant      Social History:  Social History    Tobacco Use      Smoking status: low cholesterol diet as discussed. Try to exercise at least 3 times weekly to build strength, burn calories and help to achieve ideal body weight. The patient indicates understanding of these issues and agrees to the plan.     The patient is asked to ret

## 2018-12-27 ENCOUNTER — OFFICE VISIT (OUTPATIENT)
Dept: CARDIOLOGY CLINIC | Facility: HOSPITAL | Age: 83
End: 2018-12-27
Attending: NURSE PRACTITIONER
Payer: MEDICARE

## 2018-12-27 VITALS
OXYGEN SATURATION: 98 % | SYSTOLIC BLOOD PRESSURE: 148 MMHG | WEIGHT: 169 LBS | HEART RATE: 67 BPM | BODY MASS INDEX: 24 KG/M2 | DIASTOLIC BLOOD PRESSURE: 76 MMHG

## 2018-12-27 DIAGNOSIS — Z91.89 AT RISK FOR SLEEP APNEA: ICD-10-CM

## 2018-12-27 DIAGNOSIS — I48.91 ATRIAL FIBRILLATION (HCC): ICD-10-CM

## 2018-12-27 DIAGNOSIS — Z86.79 HISTORY OF ORTHOSTATIC HYPOTENSION: ICD-10-CM

## 2018-12-27 DIAGNOSIS — I48.0 PAROXYSMAL ATRIAL FIBRILLATION (HCC): Primary | ICD-10-CM

## 2018-12-27 DIAGNOSIS — I10 ESSENTIAL HYPERTENSION WITH GOAL BLOOD PRESSURE LESS THAN 140/90: ICD-10-CM

## 2018-12-27 PROCEDURE — 93005 ELECTROCARDIOGRAM TRACING: CPT

## 2018-12-27 PROCEDURE — 99214 OFFICE O/P EST MOD 30 MIN: CPT | Performed by: NURSE PRACTITIONER

## 2018-12-27 PROCEDURE — 93010 ELECTROCARDIOGRAM REPORT: CPT | Performed by: NURSE PRACTITIONER

## 2018-12-27 PROCEDURE — 36415 COLL VENOUS BLD VENIPUNCTURE: CPT | Performed by: NURSE PRACTITIONER

## 2018-12-27 PROCEDURE — 80048 BASIC METABOLIC PNL TOTAL CA: CPT | Performed by: NURSE PRACTITIONER

## 2018-12-27 PROCEDURE — 99212 OFFICE O/P EST SF 10 MIN: CPT | Performed by: NURSE PRACTITIONER

## 2019-01-02 NOTE — PROGRESS NOTES
[facility] Transition Note      Patient Name: Boston Renner.  Patient Status: Outpatient    1928 MRN [id number]   Location [facility] Attending [address], [address] [place] [address]

## 2019-01-04 ENCOUNTER — TELEPHONE (OUTPATIENT)
Dept: CARDIOLOGY CLINIC | Facility: HOSPITAL | Age: 84
End: 2019-01-04

## 2019-01-04 DIAGNOSIS — Z91.89 AT RISK FOR SLEEP APNEA: ICD-10-CM

## 2019-01-04 DIAGNOSIS — R40.0 DAYTIME SLEEPINESS: Primary | ICD-10-CM

## 2019-01-04 DIAGNOSIS — I48.0 PAF (PAROXYSMAL ATRIAL FIBRILLATION) (HCC): ICD-10-CM

## 2019-01-04 NOTE — TELEPHONE ENCOUNTER
Spoke with patient. He is reporting episodes of waking up with elevated heart rate and high blood pressure for the last 2 nights. 2 nights ago blood pressure was in the 170s–180s and heart rates in the 85-90 with heart pounding.   Denied chest pain, shortn

## 2019-02-19 ENCOUNTER — OFFICE VISIT (OUTPATIENT)
Dept: CARDIOLOGY CLINIC | Facility: HOSPITAL | Age: 84
End: 2019-02-19
Attending: INTERNAL MEDICINE
Payer: MEDICARE

## 2019-02-19 VITALS
DIASTOLIC BLOOD PRESSURE: 71 MMHG | HEART RATE: 59 BPM | BODY MASS INDEX: 24 KG/M2 | SYSTOLIC BLOOD PRESSURE: 129 MMHG | OXYGEN SATURATION: 99 % | WEIGHT: 173 LBS

## 2019-02-19 DIAGNOSIS — I48.0 PAROXYSMAL ATRIAL FIBRILLATION (HCC): Primary | ICD-10-CM

## 2019-02-19 DIAGNOSIS — I10 ESSENTIAL HYPERTENSION WITH GOAL BLOOD PRESSURE LESS THAN 140/90: ICD-10-CM

## 2019-02-19 DIAGNOSIS — Z86.79 HISTORY OF ORTHOSTATIC HYPOTENSION: ICD-10-CM

## 2019-02-19 PROBLEM — I48.91 ATRIAL FIBRILLATION WITH RAPID VENTRICULAR RESPONSE (HCC): Status: RESOLVED | Noted: 2018-12-17 | Resolved: 2019-02-19

## 2019-02-19 PROBLEM — Z23 NEED FOR VACCINATION: Status: RESOLVED | Noted: 2018-10-03 | Resolved: 2019-02-19

## 2019-02-19 PROCEDURE — 99212 OFFICE O/P EST SF 10 MIN: CPT | Performed by: NURSE PRACTITIONER

## 2019-02-19 PROCEDURE — 99213 OFFICE O/P EST LOW 20 MIN: CPT | Performed by: NURSE PRACTITIONER

## 2019-02-19 RX ORDER — DOCUSATE SODIUM 100 MG/1
100 CAPSULE, LIQUID FILLED ORAL DAILY
COMMUNITY

## 2019-02-19 NOTE — PROGRESS NOTES
[facility] Dav Vang.  Patient Status:  No patient class for patient encounter    1928 MRN [id number]   [facility] Becca Harden. is a 80 07:28 AM    ALKPHO 63 09/22/2017 07:28 AM    BILT 1.1 09/22/2017 07:28 AM    TP 6.6 09/22/2017 07:28 AM    AST 25 09/22/2017 07:28 AM    ALT 17 09/22/2017 07:28 AM    T4F 0.96 12/17/2018 03:11 AM    TSH 6.75 (H) 12/17/2018 03:11 AM    MG 2.0 12/17/2018 03: Instructions   Continue all your same medications    Call if having any dizziness, lightheadedness, heart racing, palpitations, chest pain, shortness of breath, coughing, swelling, weight gain, fever, chills or worsening symptoms.      Weigh yourself daily

## 2019-03-07 ENCOUNTER — MED REC SCAN ONLY (OUTPATIENT)
Dept: INTERNAL MEDICINE CLINIC | Facility: CLINIC | Age: 84
End: 2019-03-07

## 2019-03-27 ENCOUNTER — OFFICE VISIT (OUTPATIENT)
Dept: INTERNAL MEDICINE CLINIC | Facility: CLINIC | Age: 84
End: 2019-03-27
Payer: MEDICARE

## 2019-03-27 VITALS
HEART RATE: 60 BPM | WEIGHT: 171 LBS | DIASTOLIC BLOOD PRESSURE: 77 MMHG | RESPIRATION RATE: 16 BRPM | HEIGHT: 71 IN | SYSTOLIC BLOOD PRESSURE: 138 MMHG | BODY MASS INDEX: 23.94 KG/M2

## 2019-03-27 DIAGNOSIS — I10 ESSENTIAL HYPERTENSION WITH GOAL BLOOD PRESSURE LESS THAN 140/90: ICD-10-CM

## 2019-03-27 DIAGNOSIS — I48.0 PAROXYSMAL ATRIAL FIBRILLATION (HCC): Primary | ICD-10-CM

## 2019-03-27 DIAGNOSIS — E78.2 MIXED HYPERLIPIDEMIA: ICD-10-CM

## 2019-03-27 DIAGNOSIS — E03.9 ACQUIRED HYPOTHYROIDISM: ICD-10-CM

## 2019-03-27 PROCEDURE — G0463 HOSPITAL OUTPT CLINIC VISIT: HCPCS | Performed by: INTERNAL MEDICINE

## 2019-03-27 PROCEDURE — 99214 OFFICE O/P EST MOD 30 MIN: CPT | Performed by: INTERNAL MEDICINE

## 2019-03-27 NOTE — PROGRESS NOTES
Aga Herzog. is a 80year old male. HPI:     1. Atrial Fibrillation    Went into atrial fibrillation and went to ER where medicine was given and he converted to sinus rhythm after several hours.  Had seen Dr Lucy Stevens in the hospital and is on cardizem 120 tablet Rfl: 3      Past Medical History:   Diagnosis Date   • Global amnesia     per N minutes in [place]   • Hypothyroid    • Inguinal hernia    • Palpitations     toprol   • Perforated ear drum    • [facility]    seed imp history of hypothyroidism and here to recheck. Has been tolerating the medication well. Last TSH was year ago, needs repeat. Denies any shakiness, palpitations, increased BM's or wgt loss. Denies any fatigue, wgt gain.     4. Mixed Hyperlipidemia    Patient

## 2019-07-09 ENCOUNTER — OFFICE VISIT (OUTPATIENT)
Dept: INTERNAL MEDICINE CLINIC | Facility: CLINIC | Age: 84
End: 2019-07-09
Payer: MEDICARE

## 2019-07-09 VITALS
WEIGHT: 168 LBS | HEIGHT: 71 IN | HEART RATE: 70 BPM | DIASTOLIC BLOOD PRESSURE: 70 MMHG | SYSTOLIC BLOOD PRESSURE: 124 MMHG | BODY MASS INDEX: 23.52 KG/M2 | RESPIRATION RATE: 16 BRPM

## 2019-07-09 DIAGNOSIS — E78.2 MIXED HYPERLIPIDEMIA: ICD-10-CM

## 2019-07-09 DIAGNOSIS — I10 ESSENTIAL HYPERTENSION WITH GOAL BLOOD PRESSURE LESS THAN 140/90: ICD-10-CM

## 2019-07-09 DIAGNOSIS — E03.9 ACQUIRED HYPOTHYROIDISM: ICD-10-CM

## 2019-07-09 DIAGNOSIS — Z23 NEED FOR VACCINATION: ICD-10-CM

## 2019-07-09 DIAGNOSIS — I48.0 PAROXYSMAL ATRIAL FIBRILLATION (HCC): Primary | ICD-10-CM

## 2019-07-09 PROCEDURE — G0463 HOSPITAL OUTPT CLINIC VISIT: HCPCS | Performed by: INTERNAL MEDICINE

## 2019-07-09 PROCEDURE — G0009 ADMIN PNEUMOCOCCAL VACCINE: HCPCS | Performed by: INTERNAL MEDICINE

## 2019-07-09 PROCEDURE — 90732 PPSV23 VACC 2 YRS+ SUBQ/IM: CPT | Performed by: INTERNAL MEDICINE

## 2019-07-09 PROCEDURE — 99214 OFFICE O/P EST MOD 30 MIN: CPT | Performed by: INTERNAL MEDICINE

## 2019-07-09 RX ORDER — LEVOTHYROXINE SODIUM 0.1 MG/1
100 TABLET ORAL
Qty: 90 TABLET | Refills: 3 | Status: SHIPPED | OUTPATIENT
Start: 2019-07-09 | End: 2020-08-24

## 2019-07-09 NOTE — PROGRESS NOTES
Lisa Kruger. is a 80year old male. HPI:     1. Atrial Fibrillation    Went into atrial fibrillation and went to ER where medicine was given and he converted to sinus rhythm after several hours.  Had seen Dr Clemens Shone in the hospital and is on cardizem 120 tablet Rfl: 3      Past Medical History:   Diagnosis Date   • Global amnesia     per N minutes in [place]   • Hypothyroid    • Inguinal hernia    • Palpitations     toprol   • Perforated ear drum    • [facility]    seed imp better blood pressure control. 3. Acquired hypothyroidism    Pt had a history of hypothyroidism and here to recheck. Has been tolerating the medication well. Last TSH was year ago. Denies any shakiness, palpitations, increased BM's or wgt loss.  Denies

## 2019-09-10 ENCOUNTER — MED REC SCAN ONLY (OUTPATIENT)
Dept: INTERNAL MEDICINE CLINIC | Facility: CLINIC | Age: 84
End: 2019-09-10

## 2019-09-25 RX ORDER — ATORVASTATIN CALCIUM 10 MG/1
TABLET, FILM COATED ORAL
Qty: 90 TABLET | Refills: 1 | Status: SHIPPED | OUTPATIENT
Start: 2019-09-25 | End: 2020-02-05

## 2019-09-25 NOTE — TELEPHONE ENCOUNTER
Please review; protocol failed.     Requested Prescriptions   Pending Prescriptions Disp Refills   • ATORVASTATIN 10 MG Oral Tab [Pharmacy Med Name: ATORVASTATIN 10 MG TABLET] 90 tablet 3     Sig: TAKE 1 TABLET BY MOUTH EVERY DAY       Cholesterol Medicat

## 2020-01-30 ENCOUNTER — HOSPITAL ENCOUNTER (OUTPATIENT)
Age: 85
Discharge: HOME | End: 2020-01-30
Attending: EMERGENCY MEDICINE
Payer: MEDICARE

## 2020-01-30 ENCOUNTER — APPOINTMENT (OUTPATIENT)
Dept: GENERAL RADIOLOGY | Age: 85
End: 2020-01-30
Attending: EMERGENCY MEDICINE
Payer: MEDICARE

## 2020-01-30 VITALS
RESPIRATION RATE: 18 BRPM | WEIGHT: 160 LBS | SYSTOLIC BLOOD PRESSURE: 135 MMHG | HEIGHT: 71 IN | BODY MASS INDEX: 22.4 KG/M2 | TEMPERATURE: 98 F | DIASTOLIC BLOOD PRESSURE: 64 MMHG | HEART RATE: 73 BPM | OXYGEN SATURATION: 98 %

## 2020-01-30 DIAGNOSIS — J40 BRONCHITIS: Primary | ICD-10-CM

## 2020-01-30 PROCEDURE — 71046 X-RAY EXAM CHEST 2 VIEWS: CPT | Performed by: EMERGENCY MEDICINE

## 2020-01-30 PROCEDURE — 99214 OFFICE O/P EST MOD 30 MIN: CPT

## 2020-01-30 PROCEDURE — 99213 OFFICE O/P EST LOW 20 MIN: CPT

## 2020-01-30 RX ORDER — BENZONATATE 100 MG/1
100 CAPSULE ORAL 3 TIMES DAILY PRN
Qty: 30 CAPSULE | Refills: 0 | Status: SHIPPED | OUTPATIENT
Start: 2020-01-30 | End: 2020-02-29

## 2020-01-30 RX ORDER — PREDNISONE 20 MG/1
40 TABLET ORAL DAILY
Qty: 6 TABLET | Refills: 0 | Status: SHIPPED | OUTPATIENT
Start: 2020-01-30 | End: 2020-02-02

## 2020-01-30 NOTE — ED PROVIDER NOTES
Patient Seen in: [facility] Immediate Care In [address] [place] [address]    History   Patient presents with:  Cough/URI    Stated Complaint: cough    HPI    Patient here with cough, congestion for several days. No travel, no known sick contacts.   Patient denies si use: No      Review of Systems    Positive for stated complaint: cough  Other systems are as noted in HPI. Constitutional and vital signs reviewed. All other systems reviewed and negative except as noted above.     PSFH elements reviewed from today an [id number]  [phone]            Medications Prescribed:  Current Discharge Medication List    START taking these medications    benzonatate 100 MG Oral Cap  Take 1 capsule (100 mg total) by mouth 3 (three) times daily as needed for cough.   Qty: 30 capsule R

## 2020-01-30 NOTE — ED INITIAL ASSESSMENT (HPI)
Cough and cold for 6 weeks using neb machine and feels its not working. No fever. Voice hoarse- yellow productive sputum.

## 2020-01-30 NOTE — ED NOTES
meds as reviewed increase po fluids rest wash hands meds as directed call make appointment for f/u go to the ed for fever shortness of breath chest pain new or worse concerns

## 2020-02-05 ENCOUNTER — OFFICE VISIT (OUTPATIENT)
Dept: INTERNAL MEDICINE CLINIC | Facility: CLINIC | Age: 85
End: 2020-02-05
Payer: MEDICARE

## 2020-02-05 VITALS
DIASTOLIC BLOOD PRESSURE: 81 MMHG | HEART RATE: 62 BPM | WEIGHT: 162 LBS | HEIGHT: 71 IN | RESPIRATION RATE: 16 BRPM | SYSTOLIC BLOOD PRESSURE: 123 MMHG | BODY MASS INDEX: 22.68 KG/M2

## 2020-02-05 DIAGNOSIS — I48.0 PAROXYSMAL ATRIAL FIBRILLATION (HCC): ICD-10-CM

## 2020-02-05 DIAGNOSIS — R05.9 COUGH: Primary | ICD-10-CM

## 2020-02-05 DIAGNOSIS — E03.9 ACQUIRED HYPOTHYROIDISM: ICD-10-CM

## 2020-02-05 DIAGNOSIS — I10 ESSENTIAL HYPERTENSION WITH GOAL BLOOD PRESSURE LESS THAN 140/90: ICD-10-CM

## 2020-02-05 PROCEDURE — G0463 HOSPITAL OUTPT CLINIC VISIT: HCPCS | Performed by: INTERNAL MEDICINE

## 2020-02-05 PROCEDURE — 99214 OFFICE O/P EST MOD 30 MIN: CPT | Performed by: INTERNAL MEDICINE

## 2020-02-05 RX ORDER — ALBUTEROL SULFATE 2.5 MG/3ML
2.5 SOLUTION RESPIRATORY (INHALATION) EVERY 6 HOURS PRN
Qty: 1 BOX | Refills: 0 | Status: SHIPPED | OUTPATIENT
Start: 2020-02-05 | End: 2020-09-24

## 2020-02-05 RX ORDER — ALBUTEROL SULFATE 2.5 MG/3ML
2.5 SOLUTION RESPIRATORY (INHALATION) EVERY 6 HOURS PRN
COMMUNITY
End: 2020-02-05

## 2020-02-05 RX ORDER — ATORVASTATIN CALCIUM 10 MG/1
10 TABLET, FILM COATED ORAL
Qty: 90 TABLET | Refills: 3 | Status: SHIPPED | OUTPATIENT
Start: 2020-02-05 | End: 2021-03-03

## 2020-02-05 NOTE — PROGRESS NOTES
Kenya Thomas. is a 80year old male. HPI:   1. Cough    Has a cough the last 3 weeks. WQent to immediate care, mCXR neg. Given steroids and inhaler. Some better but seems to linger. No fever, chills, headache, body aches.     2. Atrial Fibrillation    W daily. 30 capsule 1   • benzonatate 100 MG Oral Cap Take 1 capsule (100 mg total) by mouth 3 (three) times daily as needed for cough.  (Patient not taking: Reported on 2/5/2020 ) 30 capsule 0      Past Medical History:   Diagnosis Date   • Global amnesia 21 time. Cautioned about excessive bleeding with trauma or lacerations. To follow up with Dr Adams in a few weeks.     3. Essential hypertension with goal blood pressure less than 140/90    Patient instructed to take anti-hypertensive medicines exactly as pres

## 2020-02-05 NOTE — H&P
[facility], [facility]    History and Physical    Hurman Mock.  Patient Status:  Outpatient    1928 MRN [id number]   Location [address] MAIK Menjivar Attending No att. providers found   Hosp Day # 0 PCP Phi 12/27/2018    CO2 27 12/27/2018     (H) 12/27/2018    CA 9.4 12/27/2018    ALB 3.5 09/22/2017    ALKPHO 63 09/22/2017    BILT 1.1 09/22/2017    TP 6.6 09/22/2017    AST 25 09/22/2017    ALT 17 09/22/2017    T4F 0.96 12/17/2018    TSH 6.75 (H) 12/17/

## 2020-03-03 ENCOUNTER — LAB ENCOUNTER (OUTPATIENT)
Dept: LAB | Facility: HOSPITAL | Age: 85
End: 2020-03-03
Attending: INTERNAL MEDICINE
Payer: MEDICARE

## 2020-03-03 DIAGNOSIS — I10 ESSENTIAL HYPERTENSION WITH GOAL BLOOD PRESSURE LESS THAN 140/90: ICD-10-CM

## 2020-03-03 DIAGNOSIS — E03.9 ACQUIRED HYPOTHYROIDISM: ICD-10-CM

## 2020-03-03 LAB
ALBUMIN SERPL-MCNC: 3.7 G/DL (ref 3.4–5)
ALBUMIN/GLOB SERPL: 0.9 {RATIO} (ref 1–2)
ALP LIVER SERPL-CCNC: 70 U/L (ref 45–117)
ALT SERPL-CCNC: 23 U/L (ref 16–61)
ANION GAP SERPL CALC-SCNC: 6 MMOL/L (ref 0–18)
AST SERPL-CCNC: 23 U/L (ref 15–37)
BACTERIA UR QL AUTO: NEGATIVE /HPF
BASOPHILS # BLD AUTO: 0.05 X10(3) UL (ref 0–0.2)
BASOPHILS NFR BLD AUTO: 0.7 %
BILIRUB SERPL-MCNC: 0.9 MG/DL (ref 0.1–2)
BILIRUB UR QL: NEGATIVE
BUN BLD-MCNC: 30 MG/DL (ref 7–18)
BUN/CREAT SERPL: 18.4 (ref 10–20)
CALCIUM BLD-MCNC: 9.6 MG/DL (ref 8.5–10.1)
CHLORIDE SERPL-SCNC: 105 MMOL/L (ref 98–112)
CHOLEST SMN-MCNC: 159 MG/DL (ref ?–200)
CLARITY UR: CLEAR
CO2 SERPL-SCNC: 29 MMOL/L (ref 21–32)
COLOR UR: YELLOW
CREAT BLD-MCNC: 1.63 MG/DL (ref 0.7–1.3)
DEPRECATED RDW RBC AUTO: 47.1 FL (ref 35.1–46.3)
EOSINOPHIL # BLD AUTO: 0.44 X10(3) UL (ref 0–0.7)
EOSINOPHIL NFR BLD AUTO: 5.8 %
ERYTHROCYTE [DISTWIDTH] IN BLOOD BY AUTOMATED COUNT: 13.4 % (ref 11–15)
GLOBULIN PLAS-MCNC: 3.9 G/DL (ref 2.8–4.4)
GLUCOSE BLD-MCNC: 88 MG/DL (ref 70–99)
GLUCOSE UR-MCNC: NEGATIVE MG/DL
HCT VFR BLD AUTO: 40.2 % (ref 39–53)
HDLC SERPL-MCNC: 78 MG/DL (ref 40–59)
HGB BLD-MCNC: 13 G/DL (ref 13–17.5)
IMM GRANULOCYTES # BLD AUTO: 0.02 X10(3) UL (ref 0–1)
IMM GRANULOCYTES NFR BLD: 0.3 %
KETONES UR-MCNC: NEGATIVE MG/DL
LDLC SERPL CALC-MCNC: 68 MG/DL (ref ?–100)
LEUKOCYTE ESTERASE UR QL STRIP.AUTO: NEGATIVE
LYMPHOCYTES # BLD AUTO: 1.44 X10(3) UL (ref 1–4)
LYMPHOCYTES NFR BLD AUTO: 18.9 %
M PROTEIN MFR SERPL ELPH: 7.6 G/DL (ref 6.4–8.2)
MCH RBC QN AUTO: 30.9 PG (ref 26–34)
MCHC RBC AUTO-ENTMCNC: 32.3 G/DL (ref 31–37)
MCV RBC AUTO: 95.5 FL (ref 80–100)
MONOCYTES # BLD AUTO: 0.81 X10(3) UL (ref 0.1–1)
MONOCYTES NFR BLD AUTO: 10.7 %
NEUTROPHILS # BLD AUTO: 4.84 X10 (3) UL (ref 1.5–7.7)
NEUTROPHILS # BLD AUTO: 4.84 X10(3) UL (ref 1.5–7.7)
NEUTROPHILS NFR BLD AUTO: 63.6 %
NITRITE UR QL STRIP.AUTO: NEGATIVE
NONHDLC SERPL-MCNC: 81 MG/DL (ref ?–130)
OSMOLALITY SERPL CALC.SUM OF ELEC: 296 MOSM/KG (ref 275–295)
PATIENT FASTING Y/N/NP: YES
PATIENT FASTING Y/N/NP: YES
PH UR: 6 [PH] (ref 5–8)
PLATELET # BLD AUTO: 254 10(3)UL (ref 150–450)
POTASSIUM SERPL-SCNC: 3.9 MMOL/L (ref 3.5–5.1)
PROT UR-MCNC: NEGATIVE MG/DL
RBC # BLD AUTO: 4.21 X10(6)UL (ref 3.8–5.8)
RBC #/AREA URNS AUTO: 3 /HPF
SODIUM SERPL-SCNC: 140 MMOL/L (ref 136–145)
SP GR UR STRIP: 1.01 (ref 1–1.03)
T4 FREE SERPL-MCNC: 1.2 NG/DL (ref 0.8–1.7)
TRIGL SERPL-MCNC: 64 MG/DL (ref 30–149)
TSI SER-ACNC: 7.78 MIU/ML (ref 0.36–3.74)
UROBILINOGEN UR STRIP-ACNC: <2
VLDLC SERPL CALC-MCNC: 13 MG/DL (ref 0–30)
WBC # BLD AUTO: 7.6 X10(3) UL (ref 4–11)
WBC #/AREA URNS AUTO: <1 /HPF

## 2020-03-03 PROCEDURE — 80061 LIPID PANEL: CPT

## 2020-03-03 PROCEDURE — 84443 ASSAY THYROID STIM HORMONE: CPT

## 2020-03-03 PROCEDURE — 84439 ASSAY OF FREE THYROXINE: CPT

## 2020-03-03 PROCEDURE — 80053 COMPREHEN METABOLIC PANEL: CPT

## 2020-03-03 PROCEDURE — 81001 URINALYSIS AUTO W/SCOPE: CPT

## 2020-03-03 PROCEDURE — 85025 COMPLETE CBC W/AUTO DIFF WBC: CPT

## 2020-03-03 PROCEDURE — 36415 COLL VENOUS BLD VENIPUNCTURE: CPT

## 2020-03-04 ENCOUNTER — NURSE TRIAGE (OUTPATIENT)
Dept: OTHER | Age: 85
End: 2020-03-04

## 2020-03-04 ENCOUNTER — OFFICE VISIT (OUTPATIENT)
Dept: INTERNAL MEDICINE CLINIC | Facility: CLINIC | Age: 85
End: 2020-03-04
Payer: MEDICARE

## 2020-03-04 VITALS
DIASTOLIC BLOOD PRESSURE: 77 MMHG | WEIGHT: 161 LBS | BODY MASS INDEX: 22.54 KG/M2 | HEIGHT: 71 IN | HEART RATE: 82 BPM | SYSTOLIC BLOOD PRESSURE: 129 MMHG

## 2020-03-04 DIAGNOSIS — E03.8 SUBCLINICAL HYPOTHYROIDISM: ICD-10-CM

## 2020-03-04 DIAGNOSIS — I10 ESSENTIAL HYPERTENSION WITH GOAL BLOOD PRESSURE LESS THAN 140/90: Primary | ICD-10-CM

## 2020-03-04 PROCEDURE — 99213 OFFICE O/P EST LOW 20 MIN: CPT | Performed by: PHYSICIAN ASSISTANT

## 2020-03-04 PROCEDURE — G0463 HOSPITAL OUTPT CLINIC VISIT: HCPCS | Performed by: PHYSICIAN ASSISTANT

## 2020-03-04 RX ORDER — LEVOTHYROXINE SODIUM 0.03 MG/1
25 TABLET ORAL
Qty: 90 TABLET | Refills: 0 | Status: SHIPPED | OUTPATIENT
Start: 2020-03-04 | End: 2020-05-11

## 2020-03-04 NOTE — TELEPHONE ENCOUNTER
Action Requested: Summary for Provider     []  Critical Lab, Recommendations Needed  [] Need Additional Advice  []   FYI    []   Need Orders  [] Need Medications Sent to Pharmacy  []  Other     SUMMARY: Pt report Elevated BP today 5:30 am 151/83 Pulse 68,

## 2020-03-04 NOTE — PROGRESS NOTES
HPI:    Patient ID: Padmini Camp. is a 80year old male. HPI  Patient presents to the office for high blood pressure and to discuss TSH level. Currently taking diltiazem, usually has low blood pressure.  Over the past few days noted that it has been h implant     Social History    Tobacco Use      Smoking status: Never Smoker      Smokeless tobacco: Never Used    Alcohol use: No    Past Surgical History:   Procedure Laterality Date   • COLONOSCOPY     • ELECTROCARDIOGRAM, COMPLETE  08-    scanned sooner   No orders of the defined types were placed in this encounter. No follow-ups on file.     Meds This Visit:  Requested Prescriptions     Signed Prescriptions Disp Refills   • Levothyroxine Sodium 25 MCG Oral Tab 90 tablet 0     Sig: Take 1 tablet

## 2020-03-19 ENCOUNTER — TELEPHONE (OUTPATIENT)
Dept: OTHER | Age: 85
End: 2020-03-19

## 2020-03-19 NOTE — TELEPHONE ENCOUNTER
Patient called today as per his last labs completed on 3/3/2020, he was instructed to submit a urine specimen, and have BMP drawn in 2 weeks. Also to have TSH redrawn in 3 months. He is asking if this is a good time to do these tests due to the pandemic.

## 2020-05-11 RX ORDER — LEVOTHYROXINE SODIUM 0.03 MG/1
25 TABLET ORAL
Qty: 90 TABLET | Refills: 0 | Status: SHIPPED | OUTPATIENT
Start: 2020-05-11 | End: 2020-08-21

## 2020-06-15 ENCOUNTER — VIRTUAL PHONE E/M (OUTPATIENT)
Dept: INTERNAL MEDICINE CLINIC | Facility: CLINIC | Age: 85
End: 2020-06-15
Payer: MEDICARE

## 2020-06-15 ENCOUNTER — LAB ENCOUNTER (OUTPATIENT)
Dept: LAB | Facility: HOSPITAL | Age: 85
End: 2020-06-15
Attending: PHYSICIAN ASSISTANT
Payer: MEDICARE

## 2020-06-15 DIAGNOSIS — R94.4 DECREASED GFR: ICD-10-CM

## 2020-06-15 DIAGNOSIS — E03.9 ACQUIRED HYPOTHYROIDISM: ICD-10-CM

## 2020-06-15 DIAGNOSIS — R31.21 ASYMPTOMATIC MICROSCOPIC HEMATURIA: ICD-10-CM

## 2020-06-15 DIAGNOSIS — E03.8 SUBCLINICAL HYPOTHYROIDISM: Primary | ICD-10-CM

## 2020-06-15 PROCEDURE — 99441 PHONE E/M BY PHYS 5-10 MIN: CPT | Performed by: PHYSICIAN ASSISTANT

## 2020-06-15 PROCEDURE — 80048 BASIC METABOLIC PNL TOTAL CA: CPT

## 2020-06-15 PROCEDURE — 81001 URINALYSIS AUTO W/SCOPE: CPT

## 2020-06-15 PROCEDURE — 84439 ASSAY OF FREE THYROXINE: CPT

## 2020-06-15 PROCEDURE — 36415 COLL VENOUS BLD VENIPUNCTURE: CPT

## 2020-06-15 PROCEDURE — 84443 ASSAY THYROID STIM HORMONE: CPT

## 2020-06-15 PROCEDURE — 84481 FREE ASSAY (FT-3): CPT

## 2020-06-15 NOTE — PROGRESS NOTES
Virtual Telephone Check-In    Adrian Silke. verbally consents to a Virtual/Telephone Check-In visit on 06/15/20. Patient has been referred to the [facility] website at [email or web address] to review the yearly Consent to Treat document.     Patient unders

## 2020-07-30 ENCOUNTER — HOSPITAL ENCOUNTER (OUTPATIENT)
Age: 85
Discharge: ANOTHER HOSPITAL | End: 2020-07-30
Payer: MEDICARE

## 2020-07-30 ENCOUNTER — APPOINTMENT (OUTPATIENT)
Dept: CT IMAGING | Facility: HOSPITAL | Age: 85
End: 2020-07-30
Attending: EMERGENCY MEDICINE
Payer: MEDICARE

## 2020-07-30 ENCOUNTER — HOSPITAL ENCOUNTER (EMERGENCY)
Facility: HOSPITAL | Age: 85
Discharge: HOME | End: 2020-07-30
Attending: EMERGENCY MEDICINE
Payer: MEDICARE

## 2020-07-30 VITALS
RESPIRATION RATE: 16 BRPM | HEART RATE: 81 BPM | HEIGHT: 71 IN | BODY MASS INDEX: 22.4 KG/M2 | WEIGHT: 160 LBS | OXYGEN SATURATION: 98 % | DIASTOLIC BLOOD PRESSURE: 82 MMHG | SYSTOLIC BLOOD PRESSURE: 152 MMHG | TEMPERATURE: 98 F

## 2020-07-30 VITALS
RESPIRATION RATE: 18 BRPM | WEIGHT: 162.5 LBS | HEART RATE: 96 BPM | DIASTOLIC BLOOD PRESSURE: 84 MMHG | HEIGHT: 71 IN | SYSTOLIC BLOOD PRESSURE: 141 MMHG | TEMPERATURE: 99 F | OXYGEN SATURATION: 98 % | BODY MASS INDEX: 22.75 KG/M2

## 2020-07-30 DIAGNOSIS — S16.1XXA STRAIN OF NECK MUSCLE, INITIAL ENCOUNTER: Primary | ICD-10-CM

## 2020-07-30 DIAGNOSIS — R51.9 UNILATERAL OCCIPITAL HEADACHE: Primary | ICD-10-CM

## 2020-07-30 DIAGNOSIS — M43.6 NECK STIFFNESS: ICD-10-CM

## 2020-07-30 LAB
ANION GAP SERPL CALC-SCNC: 5 MMOL/L (ref 0–18)
BASOPHILS # BLD AUTO: 0.01 X10(3) UL (ref 0–0.2)
BASOPHILS NFR BLD AUTO: 0.1 %
BUN BLD-MCNC: 25 MG/DL (ref 7–18)
BUN/CREAT SERPL: 17.5 (ref 10–20)
CALCIUM BLD-MCNC: 10.3 MG/DL (ref 8.5–10.1)
CHLORIDE SERPL-SCNC: 106 MMOL/L (ref 98–112)
CO2 SERPL-SCNC: 29 MMOL/L (ref 21–32)
CREAT BLD-MCNC: 1.43 MG/DL (ref 0.7–1.3)
DEPRECATED RDW RBC AUTO: 44.2 FL (ref 35.1–46.3)
EOSINOPHIL # BLD AUTO: 0.09 X10(3) UL (ref 0–0.7)
EOSINOPHIL NFR BLD AUTO: 0.9 %
ERYTHROCYTE [DISTWIDTH] IN BLOOD BY AUTOMATED COUNT: 13.2 % (ref 11–15)
GLUCOSE BLD-MCNC: 90 MG/DL (ref 70–99)
HCT VFR BLD AUTO: 37.4 % (ref 39–53)
HGB BLD-MCNC: 12.2 G/DL (ref 13–17.5)
IMM GRANULOCYTES # BLD AUTO: 0.03 X10(3) UL (ref 0–1)
IMM GRANULOCYTES NFR BLD: 0.3 %
LYMPHOCYTES # BLD AUTO: 0.85 X10(3) UL (ref 1–4)
LYMPHOCYTES NFR BLD AUTO: 8.1 %
MCH RBC QN AUTO: 30.1 PG (ref 26–34)
MCHC RBC AUTO-ENTMCNC: 32.6 G/DL (ref 31–37)
MCV RBC AUTO: 92.3 FL (ref 80–100)
MONOCYTES # BLD AUTO: 1.25 X10(3) UL (ref 0.1–1)
MONOCYTES NFR BLD AUTO: 11.9 %
NEUTROPHILS # BLD AUTO: 8.27 X10 (3) UL (ref 1.5–7.7)
NEUTROPHILS # BLD AUTO: 8.27 X10(3) UL (ref 1.5–7.7)
NEUTROPHILS NFR BLD AUTO: 78.7 %
OSMOLALITY SERPL CALC.SUM OF ELEC: 294 MOSM/KG (ref 275–295)
PLATELET # BLD AUTO: 274 10(3)UL (ref 150–450)
POTASSIUM SERPL-SCNC: 4 MMOL/L (ref 3.5–5.1)
RBC # BLD AUTO: 4.05 X10(6)UL (ref 3.8–5.8)
SODIUM SERPL-SCNC: 140 MMOL/L (ref 136–145)
WBC # BLD AUTO: 10.5 X10(3) UL (ref 4–11)

## 2020-07-30 PROCEDURE — 96361 HYDRATE IV INFUSION ADD-ON: CPT

## 2020-07-30 PROCEDURE — 80048 BASIC METABOLIC PNL TOTAL CA: CPT | Performed by: EMERGENCY MEDICINE

## 2020-07-30 PROCEDURE — 70498 CT ANGIOGRAPHY NECK: CPT | Performed by: EMERGENCY MEDICINE

## 2020-07-30 PROCEDURE — 70450 CT HEAD/BRAIN W/O DYE: CPT | Performed by: EMERGENCY MEDICINE

## 2020-07-30 PROCEDURE — 85025 COMPLETE CBC W/AUTO DIFF WBC: CPT | Performed by: EMERGENCY MEDICINE

## 2020-07-30 PROCEDURE — 99204 OFFICE O/P NEW MOD 45 MIN: CPT | Performed by: EMERGENCY MEDICINE

## 2020-07-30 PROCEDURE — 99285 EMERGENCY DEPT VISIT HI MDM: CPT

## 2020-07-30 PROCEDURE — 96374 THER/PROPH/DIAG INJ IV PUSH: CPT

## 2020-07-30 RX ORDER — ACETAMINOPHEN 500 MG
1000 TABLET ORAL ONCE
Status: COMPLETED | OUTPATIENT
Start: 2020-07-30 | End: 2020-07-30

## 2020-07-30 RX ORDER — SODIUM CHLORIDE 9 MG/ML
INJECTION, SOLUTION INTRAVENOUS CONTINUOUS
Status: DISCONTINUED | OUTPATIENT
Start: 2020-07-30 | End: 2020-07-30

## 2020-07-30 RX ORDER — DIAZEPAM 2 MG/1
1-2 TABLET ORAL 3 TIMES DAILY PRN
Qty: 10 TABLET | Refills: 0 | Status: SHIPPED | OUTPATIENT
Start: 2020-07-30 | End: 2020-08-06

## 2020-07-30 RX ORDER — METOCLOPRAMIDE HYDROCHLORIDE 5 MG/ML
10 INJECTION INTRAMUSCULAR; INTRAVENOUS ONCE
Status: COMPLETED | OUTPATIENT
Start: 2020-07-30 | End: 2020-07-30

## 2020-07-30 NOTE — ED INITIAL ASSESSMENT (HPI)
Pt reports intermittent head and neck pain x one week. Denies recent head trauma. States that today he is unable to turn his head.

## 2020-07-30 NOTE — ED PROVIDER NOTES
Patient Seen in: [facility] CLINICS Immediate Care In [address] [place] [address]      History   Patient presents with:  Headache    Stated Complaint: can't move head    HPI  Patient presents with pain in the back of the head and neck.   He states he has had tightness and pa kg   SpO2 98%   BMI 22.66 kg/m²         Physical Exam  Vitals signs and nursing note reviewed. Constitutional:       General: He is not in acute distress. Appearance: He is well-developed. Comments:  Well appearing   HENT:      Head: Normocephali List

## 2020-07-30 NOTE — ED PROVIDER NOTES
Patient Seen in: [facility] AND [facility] Emergency Department      History   Patient presents with:  Neck Pain    Stated Complaint: neck pain x1.5 weeks    HPI    71-year-old male presents for evaluation of neck pain.   Patient reports over the past 1 to 2 week atraumatic. Eyes:      Conjunctiva/sclera: Conjunctivae normal.   Neck:      Musculoskeletal: Normal range of motion and neck supple.       Comments: Left-sided neck pain with range of motion  Cardiovascular:      Rate and Rhythm: Normal rate and regular RAINBOW DRAW LAVENDER   RAINBOW DRAW LIGHT GREEN   RAINBOW DRAW GOLD          Ct Brain Or Head (52330)    Result Date: 7/30/2020  CONCLUSION:  1. No acute intracranial finding.  2. Mild changes of chronic small vessel disease in cerebral white matter-age of neck muscle, initial encounter  (primary encounter diagnosis)    Plan: Well-appearing patient, with normal neurovascular exam, no acute findings on imaging. Patient is driving and does not want anything stronger than Tylenol.   Suspect torticollis or mu

## 2020-07-31 ENCOUNTER — TELEPHONE (OUTPATIENT)
Dept: INTERNAL MEDICINE CLINIC | Facility: CLINIC | Age: 85
End: 2020-07-31

## 2020-07-31 NOTE — TELEPHONE ENCOUNTER
Patient calling to inform he was seen in [address] [place] [address] ED 7/30 for neck pain and spasms. Was dx with muscle strain of neck, other tests were neg, pt is on blood thinner. They advised Tylenol and Diazepam as needed.  Today patient reports to be doing better, diazepam

## 2020-08-03 ENCOUNTER — TELEMEDICINE (OUTPATIENT)
Dept: INTERNAL MEDICINE CLINIC | Facility: CLINIC | Age: 85
End: 2020-08-03
Payer: MEDICARE

## 2020-08-03 DIAGNOSIS — I10 ESSENTIAL HYPERTENSION WITH GOAL BLOOD PRESSURE LESS THAN 140/90: ICD-10-CM

## 2020-08-03 DIAGNOSIS — S16.1XXD STRAIN OF NECK MUSCLE, SUBSEQUENT ENCOUNTER: Primary | ICD-10-CM

## 2020-08-03 DIAGNOSIS — E03.9 ACQUIRED HYPOTHYROIDISM: ICD-10-CM

## 2020-08-03 PROCEDURE — 99214 OFFICE O/P EST MOD 30 MIN: CPT | Performed by: PHYSICIAN ASSISTANT

## 2020-08-03 NOTE — PROGRESS NOTES
This is a telemedicine visit with live, interactive video and audio. Patient understands and accepts financial responsibility for any deductible, co-insurance and/or co-pays associated with this service.     SUBJECTIVE  Pt presents for a follow up of ne Box 0   • Levothyroxine Sodium 100 MCG Oral Tab Take 1 tablet (100 mcg total) by mouth before breakfast. 90 tablet 3   • docusate sodium 100 MG Oral Cap Take 100 mg by mouth daily.      • Multiple Vitamins-Minerals (CENTRUM SILVER) Oral Tab Take 1 tablet by

## 2020-08-21 NOTE — TELEPHONE ENCOUNTER
Patient requesting refill for medication below and states out of medication and was advised pharmacy submitted request for refill.     •  Levothyroxine Sodium 100 MCG Oral Tab, Take 1 tablet (100 mcg total) by mouth before breakfast., Disp: 90 tablet, Rfl:

## 2020-08-22 RX ORDER — LEVOTHYROXINE SODIUM 0.03 MG/1
25 TABLET ORAL
Qty: 90 TABLET | Refills: 1 | Status: SHIPPED | OUTPATIENT
Start: 2020-08-22 | End: 2020-08-25

## 2020-08-24 RX ORDER — LEVOTHYROXINE SODIUM 0.1 MG/1
100 TABLET ORAL
Qty: 90 TABLET | Refills: 3 | Status: SHIPPED | OUTPATIENT
Start: 2020-08-24 | End: 2021-03-29

## 2020-08-25 RX ORDER — LEVOTHYROXINE SODIUM 0.03 MG/1
25 TABLET ORAL
Qty: 90 TABLET | Refills: 0 | Status: SHIPPED | OUTPATIENT
Start: 2020-08-25 | End: 2021-03-24

## 2020-09-16 ENCOUNTER — LAB ENCOUNTER (OUTPATIENT)
Dept: LAB | Facility: HOSPITAL | Age: 85
End: 2020-09-16
Attending: PHYSICIAN ASSISTANT
Payer: MEDICARE

## 2020-09-16 DIAGNOSIS — R94.4 DECREASED GFR: ICD-10-CM

## 2020-09-16 DIAGNOSIS — E03.8 SUBCLINICAL HYPOTHYROIDISM: ICD-10-CM

## 2020-09-16 LAB
ANION GAP SERPL CALC-SCNC: 4 MMOL/L (ref 0–18)
BUN BLD-MCNC: 32 MG/DL (ref 7–18)
BUN/CREAT SERPL: 19.8 (ref 10–20)
CALCIUM BLD-MCNC: 9.3 MG/DL (ref 8.5–10.1)
CHLORIDE SERPL-SCNC: 107 MMOL/L (ref 98–112)
CO2 SERPL-SCNC: 29 MMOL/L (ref 21–32)
CREAT BLD-MCNC: 1.62 MG/DL (ref 0.7–1.3)
GLUCOSE BLD-MCNC: 85 MG/DL (ref 70–99)
OSMOLALITY SERPL CALC.SUM OF ELEC: 296 MOSM/KG (ref 275–295)
PATIENT FASTING Y/N/NP: YES
POTASSIUM SERPL-SCNC: 4.2 MMOL/L (ref 3.5–5.1)
SODIUM SERPL-SCNC: 140 MMOL/L (ref 136–145)
TSI SER-ACNC: 0.84 MIU/ML (ref 0.36–3.74)

## 2020-09-16 PROCEDURE — 36415 COLL VENOUS BLD VENIPUNCTURE: CPT

## 2020-09-16 PROCEDURE — 80048 BASIC METABOLIC PNL TOTAL CA: CPT

## 2020-09-16 PROCEDURE — 84443 ASSAY THYROID STIM HORMONE: CPT

## 2020-09-23 NOTE — TELEPHONE ENCOUNTER
Spoke with patient, (  verified ) about lab results , needs refill on Albuterol solution     Med pended for your review / approval

## 2020-09-24 RX ORDER — ALBUTEROL SULFATE 2.5 MG/3ML
2.5 SOLUTION RESPIRATORY (INHALATION) EVERY 6 HOURS PRN
Qty: 1 BOX | Refills: 0 | Status: SHIPPED | OUTPATIENT
Start: 2020-09-24 | End: 2020-11-06

## 2020-11-06 RX ORDER — ALBUTEROL SULFATE 2.5 MG/3ML
2.5 SOLUTION RESPIRATORY (INHALATION) EVERY 6 HOURS PRN
Qty: 75 ML | Refills: 0 | Status: SHIPPED | OUTPATIENT
Start: 2020-11-06

## 2020-12-16 ENCOUNTER — LAB ENCOUNTER (OUTPATIENT)
Dept: LAB | Facility: HOSPITAL | Age: 85
End: 2020-12-16
Attending: PHYSICIAN ASSISTANT
Payer: MEDICARE

## 2020-12-16 DIAGNOSIS — R94.4 DECREASED GFR: ICD-10-CM

## 2020-12-16 DIAGNOSIS — E03.9 ACQUIRED HYPOTHYROIDISM: ICD-10-CM

## 2020-12-16 PROCEDURE — 84443 ASSAY THYROID STIM HORMONE: CPT

## 2020-12-16 PROCEDURE — 80048 BASIC METABOLIC PNL TOTAL CA: CPT

## 2020-12-16 PROCEDURE — 36415 COLL VENOUS BLD VENIPUNCTURE: CPT

## 2021-01-15 ENCOUNTER — TELEPHONE (OUTPATIENT)
Dept: INTERNAL MEDICINE CLINIC | Facility: CLINIC | Age: 86
End: 2021-01-15

## 2021-01-15 NOTE — TELEPHONE ENCOUNTER
Spoke with daughter-in-law, son Rito Macdonald (he is with her, SARIKA verified) wife, requesting information on Covid vaccine availability--relayed not yet available to general public. Sent [employer] Covid message to patient via [employer]--she verbalizes understand

## 2021-01-28 ENCOUNTER — TELEPHONE (OUTPATIENT)
Dept: INTERNAL MEDICINE CLINIC | Facility: CLINIC | Age: 86
End: 2021-01-28

## 2021-02-05 ENCOUNTER — TELEPHONE (OUTPATIENT)
Dept: INTERNAL MEDICINE CLINIC | Facility: CLINIC | Age: 86
End: 2021-02-05

## 2021-02-05 NOTE — TELEPHONE ENCOUNTER
Pt would like to inform the doctor that he already had his flu vaccine. Patient states that he had it done at [facility] on 10-15-20. Pt would like added to his file.

## 2021-02-05 NOTE — TELEPHONE ENCOUNTER
Spoke to pharmacy, influenza high dose was administered on 09/10/2020. Immunization record has been updated.

## 2021-03-03 RX ORDER — ATORVASTATIN CALCIUM 10 MG/1
10 TABLET, FILM COATED ORAL
Qty: 90 TABLET | Refills: 0 | Status: SHIPPED | OUTPATIENT
Start: 2021-03-03 | End: 2021-03-24

## 2021-03-03 NOTE — TELEPHONE ENCOUNTER
Current Outpatient Medications:   ••  atorvastatin 10 MG Oral Tab, Take 1 tablet (10 mg total) by mouth once daily. , Disp: 90 tablet, Rfl: 3

## 2021-03-24 ENCOUNTER — OFFICE VISIT (OUTPATIENT)
Dept: INTERNAL MEDICINE CLINIC | Facility: CLINIC | Age: 86
End: 2021-03-24
Payer: MEDICARE

## 2021-03-24 VITALS
BODY MASS INDEX: 23.8 KG/M2 | RESPIRATION RATE: 16 BRPM | HEIGHT: 71 IN | WEIGHT: 170 LBS | HEART RATE: 79 BPM | DIASTOLIC BLOOD PRESSURE: 73 MMHG | SYSTOLIC BLOOD PRESSURE: 144 MMHG

## 2021-03-24 DIAGNOSIS — I48.0 PAROXYSMAL ATRIAL FIBRILLATION (HCC): ICD-10-CM

## 2021-03-24 DIAGNOSIS — E78.2 MIXED HYPERLIPIDEMIA: ICD-10-CM

## 2021-03-24 DIAGNOSIS — E03.9 ACQUIRED HYPOTHYROIDISM: ICD-10-CM

## 2021-03-24 DIAGNOSIS — I70.0 ATHEROSCLEROSIS OF AORTA (HCC): ICD-10-CM

## 2021-03-24 DIAGNOSIS — R04.0 EPISTAXIS: ICD-10-CM

## 2021-03-24 DIAGNOSIS — J43.8 OTHER EMPHYSEMA (HCC): ICD-10-CM

## 2021-03-24 DIAGNOSIS — Z00.00 PHYSICAL EXAM, ANNUAL: Primary | ICD-10-CM

## 2021-03-24 DIAGNOSIS — C61 MALIGNANT NEOPLASM OF PROSTATE (HCC): ICD-10-CM

## 2021-03-24 DIAGNOSIS — N18.31 STAGE 3A CHRONIC KIDNEY DISEASE (HCC): ICD-10-CM

## 2021-03-24 PROCEDURE — G0439 PPPS, SUBSEQ VISIT: HCPCS | Performed by: INTERNAL MEDICINE

## 2021-03-24 RX ORDER — ATORVASTATIN CALCIUM 10 MG/1
10 TABLET, FILM COATED ORAL
Qty: 90 TABLET | Refills: 3 | Status: SHIPPED | OUTPATIENT
Start: 2021-03-24

## 2021-03-24 RX ORDER — LEVOTHYROXINE SODIUM 0.03 MG/1
25 TABLET ORAL
Qty: 90 TABLET | Refills: 3 | Status: SHIPPED | OUTPATIENT
Start: 2021-03-24 | End: 2021-03-29

## 2021-03-24 NOTE — PROGRESS NOTES
REASON FOR VISIT:    Conrad Keita is a 80year old male who presents for a Medicare Annual Wellness visit.        Patient Care Team: Patient Care Team:  Toni Wiley MD as PCP - General (Internal Medicine)  Merline Rather, Solange Boles MD as PCP - [facility] 51 68 76   HDL 40 - 59 mg/dL 78(H) 57 60 59 53 52 59   LDL <100 mg/dL 68 63 85 86 140(H) 152(H) 144(H)     BUN and Cr Latest Ref Rng & Units 12/16/2020 9/16/2020 7/30/2020 6/15/2020 3/3/2020 12/27/2018 12/17/2018   BUN 7 - 18 mg/dL 28(H) 32(H) 25(H) 30(H) No  Difficulty dressing or bathing?: No  Problems with daily activities? : No  Memory Problems?: No      Fall/Risk Assessment     Have you fallen in the last 12 months?: 0-No  Fall/Risk Scorin        Depression Screening (PHQ-2/PHQ-9): Over the LAST 2 Inguinal hernia    • Palpitations 2001    toprol   • Perforated ear drum    • Prostate cancer ([facility] [address] 2007    seed implant      Past Surgical History:   Procedure Laterality Date   • COLONOSCOPY     • ELECTROCARDIOGRAM, COMPLETE  08-    scanned to [facility] history  ALL/ASTHMA: denies hx of allergy or asthma    EXAM:   /73 (BP Location: Right arm, Patient Position: Sitting, Cuff Size: large)   Pulse 79   Resp 16   Ht 5' 11\" (1.803 m)   Wt 170 lb (77.1 kg)   BMI 23.71 kg/m²    > BP Readings from Last 3 calories and help to achieve ideal body weight.    - CBC WITH DIFFERENTIAL WITH PLATELET; Future  - LIPID PANEL; Future  - URINALYSIS, ROUTINE; Future  - COMP METABOLIC PANEL (14); Future    3.  Stage 3a chronic kidney disease    Has chronic kidney disease

## 2021-03-29 ENCOUNTER — TELEPHONE (OUTPATIENT)
Dept: INTERNAL MEDICINE CLINIC | Facility: CLINIC | Age: 86
End: 2021-03-29

## 2021-03-29 RX ORDER — LEVOTHYROXINE SODIUM 0.1 MG/1
100 TABLET ORAL
Qty: 90 TABLET | Refills: 3 | Status: SHIPPED | OUTPATIENT
Start: 2021-03-29 | End: 2022-04-18

## 2021-03-29 NOTE — TELEPHONE ENCOUNTER
Patient calling confused on why he was prescribed levothyroxine 25 mcg daily. Per patient, he usually takes 100 mcg daily. Patient scheduled to get blood work done later this week.      Patient already picked up Levothyroxine 25 mcg but has not taken an

## 2021-03-30 NOTE — TELEPHONE ENCOUNTER
Both levothyroxine 100 and the 25 mcg dose were both on patient's chart and wrong one was sent since it had not been corrected at the visit.   I sent to the 100 mcg to the patient's pharmacy and deleted the 25

## 2021-03-31 ENCOUNTER — LAB ENCOUNTER (OUTPATIENT)
Dept: LAB | Age: 86
End: 2021-03-31
Attending: INTERNAL MEDICINE
Payer: MEDICARE

## 2021-03-31 DIAGNOSIS — E03.9 ACQUIRED HYPOTHYROIDISM: ICD-10-CM

## 2021-03-31 DIAGNOSIS — E78.2 MIXED HYPERLIPIDEMIA: ICD-10-CM

## 2021-03-31 PROCEDURE — 84443 ASSAY THYROID STIM HORMONE: CPT

## 2021-03-31 PROCEDURE — 80061 LIPID PANEL: CPT

## 2021-03-31 PROCEDURE — 36415 COLL VENOUS BLD VENIPUNCTURE: CPT

## 2021-03-31 PROCEDURE — 80053 COMPREHEN METABOLIC PANEL: CPT

## 2021-03-31 PROCEDURE — 85025 COMPLETE CBC W/AUTO DIFF WBC: CPT

## 2021-03-31 PROCEDURE — 81001 URINALYSIS AUTO W/SCOPE: CPT

## 2021-04-07 ENCOUNTER — TELEPHONE (OUTPATIENT)
Dept: INTERNAL MEDICINE CLINIC | Facility: CLINIC | Age: 86
End: 2021-04-07

## 2021-04-07 NOTE — TELEPHONE ENCOUNTER
Patient informed of results (identified name and ) and recommendations, as per provider's request note. Patient voiced understanding and agrees.

## 2021-05-26 ENCOUNTER — OFFICE VISIT (OUTPATIENT)
Dept: OPHTHALMOLOGY | Facility: CLINIC | Age: 86
End: 2021-05-26
Payer: MEDICARE

## 2021-05-26 DIAGNOSIS — H43.393 FLOATER, VITREOUS, BILATERAL: ICD-10-CM

## 2021-05-26 DIAGNOSIS — H25.13 AGE-RELATED NUCLEAR CATARACT OF BOTH EYES: Primary | ICD-10-CM

## 2021-05-26 PROCEDURE — 92015 DETERMINE REFRACTIVE STATE: CPT | Performed by: OPHTHALMOLOGY

## 2021-05-26 PROCEDURE — 92004 COMPRE OPH EXAM NEW PT 1/>: CPT | Performed by: OPHTHALMOLOGY

## 2021-05-26 NOTE — PROGRESS NOTES
Wally Mccullough. is a 80year old male. HPI:     HPI     Pt in today for a complete eye exam. Pt's last eye exam was about 2 years ago at [facility].  Pt states vision is stable with his current glasses and denies any surgeries done to the eyes o SILVER) Oral Tab Take 1 tablet by mouth daily. • rivaroxaban 15 MG Oral Tab Take 1 tablet (15 mg total) by mouth daily with food. 30 tablet 1   • DilTIAZem HCl ER Coated Beads 120 MG Oral Capsule SR 24 Hr Take 1 capsule (120 mg total) by mouth daily.  3 Final Rx       [facility]    Right +1.25 +1.25 165 20/20- +3.25 20/20    Left +1.00 +0.25 170 20/40- +3.25 20/20-    Type: Flat top bifocal                 ASSESSMENT/PLAN:     Diagnoses and Plan:     Age-related nuclear catar

## 2021-05-26 NOTE — PATIENT INSTRUCTIONS
Age-related nuclear cataract of both eyes  Discussed moderate cataracts in both eyes with patient. Cataracts are impacting vision, but no surgery is recommended at this time. Try new glasses as they improve vision.    Reassured patient that other than c

## 2021-05-26 NOTE — ASSESSMENT & PLAN NOTE
Discussed moderate cataracts in both eyes with patient. Cataracts are impacting vision, but no surgery is recommended at this time. Try new glasses as they improve vision.    Reassured patient that other than cataracts, eyes look very healthy; there is

## 2021-10-15 ENCOUNTER — IMMUNIZATION (OUTPATIENT)
Dept: LAB | Facility: HOSPITAL | Age: 86
End: 2021-10-15
Attending: EMERGENCY MEDICINE
Payer: MEDICARE

## 2021-10-15 DIAGNOSIS — Z23 NEED FOR VACCINATION: Primary | ICD-10-CM

## 2021-10-15 PROCEDURE — 0003A SARSCOV2 VAC 30MCG/0.3ML IM: CPT

## 2021-11-26 ENCOUNTER — OFFICE VISIT (OUTPATIENT)
Dept: OTOLARYNGOLOGY | Facility: CLINIC | Age: 86
End: 2021-11-26
Payer: MEDICARE

## 2021-11-26 DIAGNOSIS — R04.0 NOSEBLEED: Primary | ICD-10-CM

## 2021-11-26 PROCEDURE — 99203 OFFICE O/P NEW LOW 30 MIN: CPT | Performed by: OTOLARYNGOLOGY

## 2021-11-26 PROCEDURE — 30901 CONTROL OF NOSEBLEED: CPT | Performed by: OTOLARYNGOLOGY

## 2021-11-26 NOTE — PROGRESS NOTES
Aaliyah Vital. is a 80year old male. Patient presents with:  Nose Problem: Recurrent noseleeds      HISTORY OF PRESENT ILLNESS  He presents with a history of nosebleeds for the last year or so.   Given a referral to see me sometime ago but never came an Negative Abdominal pain and diarrhea. Endocrine Negative Cold intolerance and heat intolerance. Neuro Negative Tremors. Psych Negative Anxiety and depression. Integumentary Negative Frequent skin infections, pigment change and rash.    Hema/Lymph Ne performed on the right side. Bleeding site/vessels were treated with AgNO3 cauterization. Anesthesia used was topical Lidocaine/epinephrine 4%/1:[id number]   Patient tolerated the procedure well. Discharge instructions were discussed with the patient/parent.

## 2021-12-17 ENCOUNTER — OFFICE VISIT (OUTPATIENT)
Dept: OTOLARYNGOLOGY | Facility: CLINIC | Age: 86
End: 2021-12-17
Payer: MEDICARE

## 2021-12-17 VITALS — BODY MASS INDEX: 23.8 KG/M2 | HEIGHT: 71 IN | TEMPERATURE: 97 F | WEIGHT: 170 LBS

## 2021-12-17 DIAGNOSIS — R04.0 NOSEBLEED: Primary | ICD-10-CM

## 2021-12-17 PROCEDURE — 99213 OFFICE O/P EST LOW 20 MIN: CPT | Performed by: OTOLARYNGOLOGY

## 2021-12-17 NOTE — PROGRESS NOTES
Kenya Thomas. is a 80year old male. Patient presents with: Follow - Up: patient here for f/u regarfing right caulterization, per pt no new nosebleeds       HISTORY OF PRESENT ILLNESS  He presents with a history of nosebleeds for the last year or so. ELECTROCARDIOGRAM, COMPLETE  08-    scanned to media tab   • INGUINAL HERNIA REPAIR     • INNER EAR SURGERY PROC UNLISTED      mastoidectomy         REVIEW OF SYSTEMS    System Neg/Pos Details   Constitutional Negative Fatigue, fever and weight loss Normal. Oropharynx - Normal.   Nose/Mouth/Throat Normal Nares - Right: Normal Left: Normal. Septum -Normal  Turbinates - Right: Normal, Left: Normal.       Current Outpatient Medications:   •  Levothyroxine Sodium 100 MCG Oral Tab, Take 1 tablet (100 mcg t

## 2022-02-17 ENCOUNTER — HOSPITAL ENCOUNTER (INPATIENT)
Facility: HOSPITAL | Age: 87
LOS: 3 days | Discharge: NURSING, REHAB OR LONG-TERM CARE | DRG: 087 | End: 2022-02-20
Attending: EMERGENCY MEDICINE | Admitting: INTERNAL MEDICINE
Payer: MEDICARE

## 2022-02-17 ENCOUNTER — TELEPHONE (OUTPATIENT)
Dept: INTERNAL MEDICINE CLINIC | Facility: CLINIC | Age: 87
End: 2022-02-17

## 2022-02-17 ENCOUNTER — APPOINTMENT (OUTPATIENT)
Dept: GENERAL RADIOLOGY | Facility: HOSPITAL | Age: 87
DRG: 087 | End: 2022-02-17
Attending: EMERGENCY MEDICINE
Payer: MEDICARE

## 2022-02-17 ENCOUNTER — APPOINTMENT (OUTPATIENT)
Dept: CT IMAGING | Facility: HOSPITAL | Age: 87
DRG: 087 | End: 2022-02-17
Attending: EMERGENCY MEDICINE
Payer: MEDICARE

## 2022-02-17 DIAGNOSIS — S70.02XA CONTUSION OF LEFT HIP AND THIGH, INITIAL ENCOUNTER: ICD-10-CM
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
## 2022-02-17 DIAGNOSIS — I62.9 INTRACRANIAL HEMORRHAGE (HCC): Primary | ICD-10-CM

## 2022-02-17 DIAGNOSIS — S70.12XA CONTUSION OF LEFT HIP AND THIGH, INITIAL ENCOUNTER: ICD-10-CM

## 2022-02-17 PROBLEM — I48.91 ATRIAL FIBRILLATION (HCC): Status: ACTIVE | Noted: 2018-12-26

## 2022-02-17 PROBLEM — I10 HYPERTENSION: Status: ACTIVE | Noted: 2017-05-16

## 2022-02-17 LAB
ANION GAP SERPL CALC-SCNC: 4 MMOL/L (ref 0–18)
ANTIBODY SCREEN: NEGATIVE
APTT PPP: 39 SECONDS (ref 23.3–35.6)
BASOPHILS # BLD AUTO: 0.03 X10(3) UL (ref 0–0.2)
BASOPHILS NFR BLD AUTO: 0.3 %
BUN BLD-MCNC: 32 MG/DL (ref 7–18)
BUN/CREAT SERPL: 22.7 (ref 10–20)
CALCIUM BLD-MCNC: 9.7 MG/DL (ref 8.5–10.1)
CHLORIDE SERPL-SCNC: 105 MMOL/L (ref 98–112)
CO2 SERPL-SCNC: 29 MMOL/L (ref 21–32)
CREAT BLD-MCNC: 1.41 MG/DL
DEPRECATED RDW RBC AUTO: 47.2 FL (ref 35.1–46.3)
EOSINOPHIL # BLD AUTO: 0.04 X10(3) UL (ref 0–0.7)
EOSINOPHIL NFR BLD AUTO: 0.4 %
ERYTHROCYTE [DISTWIDTH] IN BLOOD BY AUTOMATED COUNT: 13.6 % (ref 11–15)
GLUCOSE BLD-MCNC: 104 MG/DL (ref 70–99)
HCT VFR BLD AUTO: 40.4 %
HGB BLD-MCNC: 13.1 G/DL
IMM GRANULOCYTES # BLD AUTO: 0.03 X10(3) UL (ref 0–1)
IMM GRANULOCYTES NFR BLD: 0.3 %
INR BLD: 1.88 (ref 0.8–1.2)
LYMPHOCYTES # BLD AUTO: 0.74 X10(3) UL (ref 1–4)
LYMPHOCYTES NFR BLD AUTO: 7.5 %
MCHC RBC AUTO-ENTMCNC: 32.4 G/DL (ref 31–37)
MCV RBC AUTO: 94 FL
MONOCYTES # BLD AUTO: 0.75 X10(3) UL (ref 0.1–1)
MONOCYTES NFR BLD AUTO: 7.6 %
NEUTROPHILS # BLD AUTO: 8.34 X10 (3) UL (ref 1.5–7.7)
NEUTROPHILS # BLD AUTO: 8.34 X10(3) UL (ref 1.5–7.7)
NEUTROPHILS NFR BLD AUTO: 83.9 %
OSMOLALITY SERPL CALC.SUM OF ELEC: 293 MOSM/KG (ref 275–295)
PLATELET # BLD AUTO: 253 10(3)UL (ref 150–450)
POTASSIUM SERPL-SCNC: 4 MMOL/L (ref 3.5–5.1)
PROTHROMBIN TIME: 21.9 SECONDS (ref 11.6–14.8)
RBC # BLD AUTO: 4.3 X10(6)UL
RH BLOOD TYPE: POSITIVE
SARS-COV-2 RNA RESP QL NAA+PROBE: NOT DETECTED
SODIUM SERPL-SCNC: 138 MMOL/L (ref 136–145)
WBC # BLD AUTO: 9.9 X10(3) UL (ref 4–11)

## 2022-02-17 PROCEDURE — 70486 CT MAXILLOFACIAL W/O DYE: CPT | Performed by: EMERGENCY MEDICINE

## 2022-02-17 PROCEDURE — 99223 1ST HOSP IP/OBS HIGH 75: CPT | Performed by: INTERNAL MEDICINE

## 2022-02-17 PROCEDURE — 73502 X-RAY EXAM HIP UNI 2-3 VIEWS: CPT | Performed by: EMERGENCY MEDICINE

## 2022-02-17 PROCEDURE — 70450 CT HEAD/BRAIN W/O DYE: CPT | Performed by: EMERGENCY MEDICINE

## 2022-02-17 RX ORDER — LEVETIRACETAM 500 MG/5ML
500 INJECTION, SOLUTION, CONCENTRATE INTRAVENOUS EVERY 12 HOURS
Status: DISCONTINUED | OUTPATIENT
Start: 2022-02-17 | End: 2022-02-20

## 2022-02-17 RX ORDER — ALBUTEROL SULFATE 2.5 MG/3ML
2.5 SOLUTION RESPIRATORY (INHALATION) EVERY 6 HOURS PRN
Status: DISCONTINUED | OUTPATIENT
Start: 2022-02-17 | End: 2022-02-20

## 2022-02-17 RX ORDER — DILTIAZEM HYDROCHLORIDE 120 MG/1
120 CAPSULE, EXTENDED RELEASE ORAL DAILY
Status: DISCONTINUED | OUTPATIENT
Start: 2022-02-17 | End: 2022-02-20

## 2022-02-17 RX ORDER — ATORVASTATIN CALCIUM 10 MG/1
10 TABLET, FILM COATED ORAL
Status: DISCONTINUED | OUTPATIENT
Start: 2022-02-17 | End: 2022-02-20

## 2022-02-17 RX ORDER — DOCUSATE SODIUM 100 MG/1
100 CAPSULE, LIQUID FILLED ORAL DAILY
Status: DISCONTINUED | OUTPATIENT
Start: 2022-02-17 | End: 2022-02-20

## 2022-02-17 RX ORDER — LEVOTHYROXINE SODIUM 0.05 MG/1
100 TABLET ORAL
Status: DISCONTINUED | OUTPATIENT
Start: 2022-02-18 | End: 2022-02-20

## 2022-02-17 RX ORDER — MULTIVITAMIN WITH IRON
1 TABLET ORAL DAILY
Status: DISCONTINUED | OUTPATIENT
Start: 2022-02-17 | End: 2022-02-20

## 2022-02-17 NOTE — ED INITIAL ASSESSMENT (HPI)
Via [facility], slip and fall on black ice falling backwards striking head.  Denies neck pain, complains of jaw pain, headache and left hip pain  On xarelto

## 2022-02-17 NOTE — CM/SW NOTE
02/17/22 1500   CM/SW Referral Data   Referral Source Social Work (self-referral)   Reason for Referral Discharge planning   Informant Patient   Pertinent Medical Hx   Does patient have an established PCP? Yes   Patient Info   Patient's Current Mental Status at Time of Assessment Alert;Oriented   Patient's [address]   Number of Levels in Home 1   Number of Stair in Home 0   Patient lives with Alone   Patient Status Prior to Admission   Independent with ADLs and Mobility Yes   Discharge Needs   Anticipated D/C needs No anticipated discharge needs     SW initiated self referral for DC planning. Pt presents from home alone. Pt's wife is currently at [facility] for [address] stay. Pt anticipates his spouse will be in [facility] for several more weeks. Pt reports being extremely independent. Prior to hospitalization, pt was up walking without any assisted devices. Pt states he swims 5 days a week. No HX of RESHMA/HH. Pt drives. Pt does not personally use/need any DME. Pt's home is handicap accessible for his wife. SW provided education on home health at [facility]. Pt is declining need for home health at [facility]. Pt plans to home with his son Jignesh German when medically cleared. PLAN: pending medical clearance -- home    SW remains available for support and/or discharge planning. Please do not hesitate to call/chat SW if further DC needs arise.      Martita Chadwick MSW, [facility]  Ext [phone]

## 2022-02-17 NOTE — ED QUICK NOTES
Denies n/v or blurred vision  Pt reports mild headache 2/10 at this time, manageable and not requesting pain meds  Urinating in urinal  NPO in ED  Pt reports he took 2 advil PTA this morning  Waiting for [facility] from pharm  Pt AAOx4 and verbal

## 2022-02-17 NOTE — ED QUICK NOTES
Orders for admission, patient is aware of plan and ready to go upstairs. Any questions, please call ED FRAN mccoy at extension [phone].      Patient Covid vaccination status: Fully vaccinated and immunocompromised     COVID Test Ordered in ED: Rapid SARS-CoV-2 by PCR    COVID Suspicion at Admission: Low clinical suspicion for COVID    Running Infusions:  None    Mental Status/LOC at time of transport: AAOx4    Other pertinent information:   CIWA score: N/A   NIH score:  N/A

## 2022-02-17 NOTE — TELEPHONE ENCOUNTER
Pt daughter in law  States pt fell today and is currently admitted. Per daughter in law pt also injured left hip during fall and x ray has indicated no fracture, \"but Vinny's wife also had a hip injury and an x ray did not show a fracture and an MRI was ordered which did show a fracture. We asked the doctors at the hospital to order an MRI and they say he doesn't need one, but we are worried if it is broken then he will go home and fall again. Is there anyway Dr Herman Peña can help us with this or what does he think? \"      Please advise. Nurses, please call pt son \"Jayson\" back with MD response.

## 2022-02-17 NOTE — TELEPHONE ENCOUNTER
Discussed with son  We will hold off MRI of the hip now. We will make him ambulate in the morning and if there is any pain or limping, will consider MRI.     Can close encounter    Sai Marr

## 2022-02-18 ENCOUNTER — APPOINTMENT (OUTPATIENT)
Dept: CT IMAGING | Facility: HOSPITAL | Age: 87
DRG: 087 | End: 2022-02-18
Attending: NEUROLOGICAL SURGERY
Payer: MEDICARE

## 2022-02-18 LAB
ANION GAP SERPL CALC-SCNC: 5 MMOL/L (ref 0–18)
BUN BLD-MCNC: 25 MG/DL (ref 7–18)
BUN/CREAT SERPL: 19.8 (ref 10–20)
CALCIUM BLD-MCNC: 9 MG/DL (ref 8.5–10.1)
CHLORIDE SERPL-SCNC: 109 MMOL/L (ref 98–112)
CO2 SERPL-SCNC: 27 MMOL/L (ref 21–32)
CREAT BLD-MCNC: 1.26 MG/DL
DEPRECATED RDW RBC AUTO: 46.9 FL (ref 35.1–46.3)
ERYTHROCYTE [DISTWIDTH] IN BLOOD BY AUTOMATED COUNT: 13.7 % (ref 11–15)
GLUCOSE BLD-MCNC: 89 MG/DL (ref 70–99)
HCT VFR BLD AUTO: 39.5 %
HGB BLD-MCNC: 13 G/DL
MCH RBC QN AUTO: 30.9 PG (ref 26–34)
MCHC RBC AUTO-ENTMCNC: 32.9 G/DL (ref 31–37)
MCV RBC AUTO: 93.8 FL
OSMOLALITY SERPL CALC.SUM OF ELEC: 296 MOSM/KG (ref 275–295)
PLATELET # BLD AUTO: 253 10(3)UL (ref 150–450)
POTASSIUM SERPL-SCNC: 4 MMOL/L (ref 3.5–5.1)
RBC # BLD AUTO: 4.21 X10(6)UL
SODIUM SERPL-SCNC: 141 MMOL/L (ref 136–145)
WBC # BLD AUTO: 7.9 X10(3) UL (ref 4–11)

## 2022-02-18 PROCEDURE — 99232 SBSQ HOSP IP/OBS MODERATE 35: CPT | Performed by: INTERNAL MEDICINE

## 2022-02-18 PROCEDURE — 70450 CT HEAD/BRAIN W/O DYE: CPT | Performed by: NEUROLOGICAL SURGERY

## 2022-02-18 NOTE — PROGRESS NOTES
Care taken over around [address] appropriate and moving all extremities. Transfer order noted. Patient left for repeat CT head around 656AM with transport.

## 2022-02-18 NOTE — PLAN OF CARE
Problem: Patient Centered Care  Goal: Patient preferences are identified and integrated in the patient's plan of care  Description: Interventions:  - Provide timely, complete, and accurate information to patient/family  - Incorporate patient and family knowledge, values, beliefs, and cultural backgrounds into the planning and delivery of care  - Encourage patient/family to participate in care and decision-making at the level they choose  - Honor patient and family perspectives and choices  Outcome: Progressing     Problem: Patient/Family Goals  Goal: Patient/Family Long Term Goal  Description: Patient's Long Term Goal: Discharge home    Interventions:  - Q2 neuro exam  - See additional Care Plan goals for specific interventions  Outcome: Progressing  Goal: Patient/Family Short Term Goal  Description: Patient's Short Term Goal: Remain free from falls    Interventions:   - Bed alarm  - Use call light  - See additional Care Plan goals for specific interventions  Outcome: Progressing     Problem: PAIN - ADULT  Goal: Verbalizes/displays adequate comfort level or patient's stated pain goal  Description: INTERVENTIONS:  - Encourage pt to monitor pain and request assistance  - Assess pain using appropriate pain scale  - Administer analgesics based on type and severity of pain and evaluate response  - Implement non-pharmacological measures as appropriate and evaluate response  - Consider cultural and social influences on pain and pain management  - Manage/alleviate anxiety  - Utilize distraction and/or relaxation techniques  - Monitor for opioid side effects  - Notify MD/LIP if interventions unsuccessful or patient reports new pain  - Anticipate increased pain with activity and pre-medicate as appropriate  Outcome: Progressing     Problem: RISK FOR INFECTION - ADULT  Goal: Absence of fever/infection during anticipated neutropenic period  Description: INTERVENTIONS  - Monitor WBC  - Administer growth factors as ordered  - Implement neutropenic guidelines  Outcome: Progressing     Problem: SAFETY ADULT - FALL  Goal: Free from fall injury  Description: INTERVENTIONS:  - Assess pt frequently for physical needs  - Identify cognitive and physical deficits and behaviors that affect risk of falls.   - [employer] fall precautions as indicated by assessment.  - Educate pt/family on patient safety including physical limitations  - Instruct pt to call for assistance with activity based on assessment  - Modify environment to reduce risk of injury  - Provide assistive devices as appropriate  - Consider OT/PT consult to assist with strengthening/mobility  - Encourage toileting schedule  Outcome: Progressing

## 2022-02-18 NOTE — CDS QUERY
How to Answer this Query    1.) Click \"Edit Button\" on the toolbar  2.) Type an \"X\" in the bracket for the diagnosis that applies. (You may also add additional clinical details as you feel necessary to substantiate your response). 3.) Finally click \"Sign\" to complete response. Thank you     Potential for Altered Heart Rate/Rhythm - Atrial Fibrillation  CLINICAL DOCUMENTATION CLARIFICATION FORM  Dear Doctor Joseph Dorantes:  Clinical information in the patient's medical record (provided below) includes documentation of Atrial Fibrillation. For accurate ICD-10-CM code assignment to reflect severity of illness and risk of mortality,  PLEASE (X)  ALL DIAGNOSES THAT APPLY. SELECTION BY PROVIDER ONLY    (x )  Paroxysmal Atrial Fibrillation    ( )  Persistent Atrial Fibrillation    ( )  Permanent Atrial Fibrillation    ( )  Chronic Atrial Fibrillation    ( )  Other (please specify):     ( )  Unable to Determine (please comment)          Documentation includes Atrial Fibrillation:     H&P: History of atrial fibrillation. He reports that he slipped and fell and hit his head. He was taking Xarelto for atrial fibrillation. In the emergency room, he was found to have intracranial hemorrhage. Xarelto was reversed with Kcentra. Assessment:  Acute subarachnoid hemorrhage-he was on Xarelto  Atrial fibrillation-he appears to be in sinus rhythm now. Continue diltiazem. Xarelto has been reversed    Telemetry: NSR;Atrial fibrillation    Meds include diltiazem daily        If you have any questions, please contact Clinical Documentation  Specialist:  Chayito Todd RN CCDS at [phone]     Thank You!      THIS FORM IS A PERMANENT PART OF THE MEDICAL RECORD

## 2022-02-18 NOTE — CM/SW NOTE
BPCI-Advanced Medicare Program Note:  Plan of care reviewed for care coordination and discharge planning. Noted patient falls under  BPCI/Medicare program, with  for stroke. DAYANA tool was used to help determine next care setting. Thus, [address] recommendations is for home pending patient progress. Case Management team is following for care coordination and discharge planning needs.

## 2022-02-18 NOTE — CM/SW NOTE
Addendum 3:11:  Todd Meneses made to [facility] at pt request.    PT rec SNF. Pt spouse is currently @ [facility], Referral requested to be uploaded via [facility] to [facility]/DON requested. SW/CM to remain available for support and/or discharge planning.      LUIS A Jarquin, [facility]   SCF:#[id number]

## 2022-02-18 NOTE — PLAN OF CARE
Pt feeling well, AOx4, no neuro deficits. Feels ready to go home. DC approved by neurosurgery after PT/OT eval. Patient lives at home alone and son voices concern about safety. Transferred patient to cath lab overflow Rm 13 and gave report to RN.  Patient and son aware of plan for discharge pending PT/OT eval.    Problem: Patient Centered Care  Goal: Patient preferences are identified and integrated in the patient's plan of care  Description: Interventions:  - What would you like us to know as we care for you?   - Provide timely, complete, and accurate information to patient/family  - Incorporate patient and family knowledge, values, beliefs, and cultural backgrounds into the planning and delivery of care  - Encourage patient/family to participate in care and decision-making at the level they choose  - Honor patient and family perspectives and choices  Outcome: Progressing     Problem: Patient/Family Goals  Goal: Patient/Family Long Term Goal  Description: Patient's Long Term Goal: return to previous activities; swimming, driving    Interventions:  - PT/OT eval  - See additional Care Plan goals for specific interventions  Outcome: Progressing  Goal: Patient/Family Short Term Goal  Description: Patient's Short Term Goal: go home    Interventions:   - per neuro surgery  - PT/OT  - See additional Care Plan goals for specific interventions  Outcome: Progressing     Problem: PAIN - ADULT  Goal: Verbalizes/displays adequate comfort level or patient's stated pain goal  Description: INTERVENTIONS:  - Encourage pt to monitor pain and request assistance  - Assess pain using appropriate pain scale  - Administer analgesics based on type and severity of pain and evaluate response  - Implement non-pharmacological measures as appropriate and evaluate response  - Consider cultural and social influences on pain and pain management  - Manage/alleviate anxiety  - Utilize distraction and/or relaxation techniques  - Monitor for opioid side effects  - Notify MD/LIP if interventions unsuccessful or patient reports new pain  - Anticipate increased pain with activity and pre-medicate as appropriate  Outcome: Progressing     Problem: RISK FOR INFECTION - ADULT  Goal: Absence of fever/infection during anticipated neutropenic period  Description: INTERVENTIONS  - Monitor WBC  - Administer growth factors as ordered  - Implement neutropenic guidelines  Outcome: Progressing     Problem: SAFETY ADULT - FALL  Goal: Free from fall injury  Description: INTERVENTIONS:  - Assess pt frequently for physical needs  - Identify cognitive and physical deficits and behaviors that affect risk of falls. - [place] fall precautions as indicated by assessment.  - Educate pt/family on patient safety including physical limitations  - Instruct pt to call for assistance with activity based on assessment  - Modify environment to reduce risk of injury  - Provide assistive devices as appropriate  - Consider OT/PT consult to assist with strengthening/mobility  - Encourage toileting schedule  Outcome: Progressing     Problem: NEUROLOGICAL - ADULT  Goal: Achieves stable or improved neurological status  Description: INTERVENTIONS  - Assess for and report changes in neurological status  - Initiate measures to prevent increased intracranial pressure  - Maintain blood pressure and fluid volume within ordered parameters to optimize cerebral perfusion and minimize risk of hemorrhage  - Monitor temperature, glucose, and sodium.  Initiate appropriate interventions as ordered  Outcome: Progressing  Goal: Absence of seizures  Description: INTERVENTIONS  - Monitor for seizure activity  - Administer anti-seizure medications as ordered  - Monitor neurological status  Outcome: Progressing  Goal: Remains free of injury related to seizure activity  Description: INTERVENTIONS:  - Maintain airway, patient safety  and administer oxygen as ordered  - Monitor patient for seizure activity, document and report duration and description of seizure to MD/LIP  - If seizure occurs, turn patient to side and suction secretions as needed  - Reorient patient post seizure  - Seizure pads on all 4 side rails  - Instruct patient/family to notify RN of any seizure activity  - Instruct patient/family to call for assistance with activity based on assessment  Outcome: Progressing  Goal: Achieves maximal functionality and self care  Description: INTERVENTIONS  - Monitor swallowing and airway patency with patient fatigue and changes in neurological status  - Encourage and assist patient to increase activity and self care with guidance from PT/OT  - Encourage visually impaired, hearing impaired and aphasic patients to use assistive/communication devices  Outcome: Progressing

## 2022-02-18 NOTE — PLAN OF CARE
Received PT from ED. Pt on room air, AO x 4. Q2 neuros. Pt on cardiac diet. Repeat head CT in the AM. Bed locked and in lowest position. Call light within reach. Care partner at bedside.         Problem: Patient Centered Care  Goal: Patient preferences are identified and integrated in the patient's plan of care  Description: Interventions:  - What would you like us to know as we care for you?   - Provide timely, complete, and accurate information to patient/family  - Incorporate patient and family knowledge, values, beliefs, and cultural backgrounds into the planning and delivery of care  - Encourage patient/family to participate in care and decision-making at the level they choose  - Honor patient and family perspectives and choices  Outcome: Progressing     Problem: Patient/Family Goals  Goal: Patient/Family Long Term Goal  Description: Patient's Long Term Goal:     Interventions:  -   - See additional Care Plan goals for specific interventions  Outcome: Progressing  Goal: Patient/Family Short Term Goal  Description: Patient's Short Term Goal:     Interventions:   -   - See additional Care Plan goals for specific interventions  Outcome: Progressing     Problem: PAIN - ADULT  Goal: Verbalizes/displays adequate comfort level or patient's stated pain goal  Description: INTERVENTIONS:  - Encourage pt to monitor pain and request assistance  - Assess pain using appropriate pain scale  - Administer analgesics based on type and severity of pain and evaluate response  - Implement non-pharmacological measures as appropriate and evaluate response  - Consider cultural and social influences on pain and pain management  - Manage/alleviate anxiety  - Utilize distraction and/or relaxation techniques  - Monitor for opioid side effects  - Notify MD/LIP if interventions unsuccessful or patient reports new pain  - Anticipate increased pain with activity and pre-medicate as appropriate  Outcome: Progressing     Problem: RISK FOR INFECTION - ADULT  Goal: Absence of fever/infection during anticipated neutropenic period  Description: INTERVENTIONS  - Monitor WBC  - Administer growth factors as ordered  - Implement neutropenic guidelines  Outcome: Progressing     Problem: SAFETY ADULT - FALL  Goal: Free from fall injury  Description: INTERVENTIONS:  - Assess pt frequently for physical needs  - Identify cognitive and physical deficits and behaviors that affect risk of falls.   - [place] fall precautions as indicated by assessment.  - Educate pt/family on patient safety including physical limitations  - Instruct pt to call for assistance with activity based on assessment  - Modify environment to reduce risk of injury  - Provide assistive devices as appropriate  - Consider OT/PT consult to assist with strengthening/mobility  - Encourage toileting schedule  Outcome: Progressing

## 2022-02-19 PROCEDURE — 99232 SBSQ HOSP IP/OBS MODERATE 35: CPT | Performed by: INTERNAL MEDICINE

## 2022-02-19 RX ORDER — ACETAMINOPHEN 500 MG
1000 TABLET ORAL EVERY 6 HOURS PRN
Status: DISCONTINUED | OUTPATIENT
Start: 2022-02-19 | End: 2022-02-20

## 2022-02-19 NOTE — PLAN OF CARE
Pt reporting throbbing headache this AM - MD notified and Tylenol ordered for pain. No acute neuro changes overnight. Ambulating with contact guard assist to bathroom. VS and neuro q4hrs. Con't IV Keppra. Plan for [place] rehab at discharge. Problem: Patient Centered Care  Goal: Patient preferences are identified and integrated in the patient's plan of care  Description: Interventions:  - What would you like us to know as we care for you? Rachel Yoon lives at home alone. His wife is currently at [facility] getting rehab. She is currently undergoing chemotherapy for lung cancer with mets to bone.  Rachel Yoon also swims every day for 30 minutes   - Provide timely, complete, and accurate information to patient/family  - Incorporate patient and family knowledge, values, beliefs, and cultural backgrounds into the planning and delivery of care  - Encourage patient/family to participate in care and decision-making at the level they choose  - Honor patient and family perspectives and choices  Outcome: Progressing     Problem: PAIN - ADULT  Goal: Verbalizes/displays adequate comfort level or patient's stated pain goal  Description: INTERVENTIONS:  - Encourage pt to monitor pain and request assistance  - Assess pain using appropriate pain scale  - Administer analgesics based on type and severity of pain and evaluate response  - Implement non-pharmacological measures as appropriate and evaluate response  - Consider cultural and social influences on pain and pain management  - Manage/alleviate anxiety  - Utilize distraction and/or relaxation techniques  - Monitor for opioid side effects  - Notify MD/LIP if interventions unsuccessful or patient reports new pain  - Anticipate increased pain with activity and pre-medicate as appropriate  Outcome: Progressing     Problem: RISK FOR INFECTION - ADULT  Goal: Absence of fever/infection during anticipated neutropenic period  Description: INTERVENTIONS  - Monitor WBC  - Administer growth factors as ordered  - Implement neutropenic guidelines  Outcome: Progressing     Problem: SAFETY ADULT - FALL  Goal: Free from fall injury  Description: INTERVENTIONS:  - Assess pt frequently for physical needs  - Identify cognitive and physical deficits and behaviors that affect risk of falls. - [place] fall precautions as indicated by assessment.  - Educate pt/family on patient safety including physical limitations  - Instruct pt to call for assistance with activity based on assessment  - Modify environment to reduce risk of injury  - Provide assistive devices as appropriate  - Consider OT/PT consult to assist with strengthening/mobility  - Encourage toileting schedule  Outcome: Progressing     Problem: NEUROLOGICAL - ADULT  Goal: Achieves stable or improved neurological status  Description: INTERVENTIONS  - Assess for and report changes in neurological status  - Initiate measures to prevent increased intracranial pressure  - Maintain blood pressure and fluid volume within ordered parameters to optimize cerebral perfusion and minimize risk of hemorrhage  - Monitor temperature, glucose, and sodium.  Initiate appropriate interventions as ordered  Outcome: Progressing  Goal: Absence of seizures  Description: INTERVENTIONS  - Monitor for seizure activity  - Administer anti-seizure medications as ordered  - Monitor neurological status  Outcome: Progressing  Goal: Remains free of injury related to seizure activity  Description: INTERVENTIONS:  - Maintain airway, patient safety  and administer oxygen as ordered  - Monitor patient for seizure activity, document and report duration and description of seizure to MD/LIP  - If seizure occurs, turn patient to side and suction secretions as needed  - Reorient patient post seizure  - Seizure pads on all 4 side rails  - Instruct patient/family to notify RN of any seizure activity  - Instruct patient/family to call for assistance with activity based on assessment  Outcome: Progressing  Goal: Achieves maximal functionality and self care  Description: INTERVENTIONS  - Monitor swallowing and airway patency with patient fatigue and changes in neurological status  - Encourage and assist patient to increase activity and self care with guidance from PT/OT  - Encourage visually impaired, hearing impaired and aphasic patients to use assistive/communication devices  Outcome: Progressing

## 2022-02-19 NOTE — PLAN OF CARE
Problem: PAIN - ADULT  Goal: Verbalizes/displays adequate comfort level or patient's stated pain goal  Description: INTERVENTIONS:  - Encourage pt to monitor pain and request assistance  - Assess pain using appropriate pain scale  - Administer analgesics based on type and severity of pain and evaluate response  - Implement non-pharmacological measures as appropriate and evaluate response  - Consider cultural and social influences on pain and pain management  - Manage/alleviate anxiety  - Utilize distraction and/or relaxation techniques  - Monitor for opioid side effects  - Notify MD/LIP if interventions unsuccessful or patient reports new pain  - Anticipate increased pain with activity and pre-medicate as appropriate  Outcome: Progressing     Problem: RISK FOR INFECTION - ADULT  Goal: Absence of fever/infection during anticipated neutropenic period  Description: INTERVENTIONS  - Monitor WBC  - Administer growth factors as ordered  - Implement neutropenic guidelines  Outcome: Progressing     Problem: SAFETY ADULT - FALL  Goal: Free from fall injury  Description: INTERVENTIONS:  - Assess pt frequently for physical needs  - Identify cognitive and physical deficits and behaviors that affect risk of falls.   - [place] fall precautions as indicated by assessment.  - Educate pt/family on patient safety including physical limitations  - Instruct pt to call for assistance with activity based on assessment  - Modify environment to reduce risk of injury  - Provide assistive devices as appropriate  - Consider OT/PT consult to assist with strengthening/mobility  - Encourage toileting schedule  Outcome: Progressing     Problem: NEUROLOGICAL - ADULT  Goal: Achieves stable or improved neurological status  Description: INTERVENTIONS  - Assess for and report changes in neurological status  - Initiate measures to prevent increased intracranial pressure  - Maintain blood pressure and fluid volume within ordered parameters to optimize cerebral perfusion and minimize risk of hemorrhage  - Monitor temperature, glucose, and sodium. Initiate appropriate interventions as ordered  Outcome: Progressing  Goal: Absence of seizures  Description: INTERVENTIONS  - Monitor for seizure activity  - Administer anti-seizure medications as ordered  - Monitor neurological status  Outcome: Progressing  Goal: Remains free of injury related to seizure activity  Description: INTERVENTIONS:  - Maintain airway, patient safety  and administer oxygen as ordered  - Monitor patient for seizure activity, document and report duration and description of seizure to MD/LIP  - If seizure occurs, turn patient to side and suction secretions as needed  - Reorient patient post seizure  - Seizure pads on all 4 side rails  - Instruct patient/family to notify RN of any seizure activity  - Instruct patient/family to call for assistance with activity based on assessment  Outcome: Progressing  Goal: Achieves maximal functionality and self care  Description: INTERVENTIONS  - Monitor swallowing and airway patency with patient fatigue and changes in neurological status  - Encourage and assist patient to increase activity and self care with guidance from PT/OT  - Encourage visually impaired, hearing impaired and aphasic patients to use assistive/communication devices  Outcome: Progressing     No neuro changes noted.   Plan for transfer to Rehab

## 2022-02-20 VITALS
TEMPERATURE: 98 F | WEIGHT: 155.63 LBS | DIASTOLIC BLOOD PRESSURE: 69 MMHG | BODY MASS INDEX: 21.79 KG/M2 | RESPIRATION RATE: 18 BRPM | HEIGHT: 71 IN | SYSTOLIC BLOOD PRESSURE: 101 MMHG | OXYGEN SATURATION: 96 % | HEART RATE: 67 BPM

## 2022-02-20 PROBLEM — I62.9 INTRACRANIAL HEMORRHAGE (HCC): Status: RESOLVED | Noted: 2022-02-17 | Resolved: 2022-02-20
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
## 2022-02-20 PROCEDURE — 99239 HOSP IP/OBS DSCHRG MGMT >30: CPT | Performed by: INTERNAL MEDICINE

## 2022-02-20 RX ORDER — LEVETIRACETAM 500 MG/1
500 TABLET ORAL 2 TIMES DAILY
Qty: 8 TABLET | Refills: 0 | Status: SHIPPED | OUTPATIENT
Start: 2022-02-20 | End: 2022-02-24

## 2022-02-20 RX ORDER — LEVETIRACETAM 500 MG/1
500 TABLET ORAL 2 TIMES DAILY
Status: DISCONTINUED | OUTPATIENT
Start: 2022-02-20 | End: 2022-02-20

## 2022-02-20 NOTE — PLAN OF CARE
Plan for discharge to [facility] tomorrow if bed available, neurologically intact, headache resolved. Vitals stable, Afib. Ambulated in [place]oya x2 with walker and standby assist tolerated well.    Problem: Patient Centered Care  Goal: Patient preferences are identified and integrated in the patient's plan of care  Description: Interventions:  - What would you like us to know as we care for you?   - Provide timely, complete, and accurate information to patient/family  - Incorporate patient and family knowledge, values, beliefs, and cultural backgrounds into the planning and delivery of care  - Encourage patient/family to participate in care and decision-making at the level they choose  - Honor patient and family perspectives and choices  Outcome: Progressing     Problem: Patient/Family Goals  Goal: Patient/Family Long Term Goal  Description: Patient's Long Term Goal:     Interventions:  -   - See additional Care Plan goals for specific interventions  Outcome: Progressing  Goal: Patient/Family Short Term Goal  Description: Patient's Short Term Goal:     Interventions:   -   - See additional Care Plan goals for specific interventions  Outcome: Progressing     Problem: PAIN - ADULT  Goal: Verbalizes/displays adequate comfort level or patient's stated pain goal  Description: INTERVENTIONS:  - Encourage pt to monitor pain and request assistance  - Assess pain using appropriate pain scale  - Administer analgesics based on type and severity of pain and evaluate response  - Implement non-pharmacological measures as appropriate and evaluate response  - Consider cultural and social influences on pain and pain management  - Manage/alleviate anxiety  - Utilize distraction and/or relaxation techniques  - Monitor for opioid side effects  - Notify MD/LIP if interventions unsuccessful or patient reports new pain  - Anticipate increased pain with activity and pre-medicate as appropriate  Outcome: Progressing     Problem: RISK FOR INFECTION - ADULT  Goal: Absence of fever/infection during anticipated neutropenic period  Description: INTERVENTIONS  - Monitor WBC  - Administer growth factors as ordered  - Implement neutropenic guidelines  Outcome: Progressing     Problem: SAFETY ADULT - FALL  Goal: Free from fall injury  Description: INTERVENTIONS:  - Assess pt frequently for physical needs  - Identify cognitive and physical deficits and behaviors that affect risk of falls. - [place] fall precautions as indicated by assessment.  - Educate pt/family on patient safety including physical limitations  - Instruct pt to call for assistance with activity based on assessment  - Modify environment to reduce risk of injury  - Provide assistive devices as appropriate  - Consider OT/PT consult to assist with strengthening/mobility  - Encourage toileting schedule  Outcome: Progressing     Problem: NEUROLOGICAL - ADULT  Goal: Achieves stable or improved neurological status  Description: INTERVENTIONS  - Assess for and report changes in neurological status  - Initiate measures to prevent increased intracranial pressure  - Maintain blood pressure and fluid volume within ordered parameters to optimize cerebral perfusion and minimize risk of hemorrhage  - Monitor temperature, glucose, and sodium.  Initiate appropriate interventions as ordered  Outcome: Progressing  Goal: Absence of seizures  Description: INTERVENTIONS  - Monitor for seizure activity  - Administer anti-seizure medications as ordered  - Monitor neurological status  Outcome: Progressing  Goal: Remains free of injury related to seizure activity  Description: INTERVENTIONS:  - Maintain airway, patient safety  and administer oxygen as ordered  - Monitor patient for seizure activity, document and report duration and description of seizure to MD/LIP  - If seizure occurs, turn patient to side and suction secretions as needed  - Reorient patient post seizure  - Seizure pads on all 4 side rails  - Instruct patient/family to notify RN of any seizure activity  - Instruct patient/family to call for assistance with activity based on assessment  Outcome: Progressing  Goal: Achieves maximal functionality and self care  Description: INTERVENTIONS  - Monitor swallowing and airway patency with patient fatigue and changes in neurological status  - Encourage and assist patient to increase activity and self care with guidance from PT/OT  - Encourage visually impaired, hearing impaired and aphasic patients to use assistive/communication devices  Outcome: Progressing

## 2022-02-20 NOTE — PLAN OF CARE
Problem: PAIN - ADULT  Goal: Verbalizes/displays adequate comfort level or patient's stated pain goal  Description: INTERVENTIONS:  - Encourage pt to monitor pain and request assistance  - Assess pain using appropriate pain scale  - Administer analgesics based on type and severity of pain and evaluate response  - Implement non-pharmacological measures as appropriate and evaluate response  - Consider cultural and social influences on pain and pain management  - Manage/alleviate anxiety  - Utilize distraction and/or relaxation techniques  - Monitor for opioid side effects  - Notify MD/LIP if interventions unsuccessful or patient reports new pain  - Anticipate increased pain with activity and pre-medicate as appropriate  Outcome: Completed     Problem: RISK FOR INFECTION - ADULT  Goal: Absence of fever/infection during anticipated neutropenic period  Description: INTERVENTIONS  - Monitor WBC  - Administer growth factors as ordered  - Implement neutropenic guidelines  Outcome: Completed     Problem: SAFETY ADULT - FALL  Goal: Free from fall injury  Description: INTERVENTIONS:  - Assess pt frequently for physical needs  - Identify cognitive and physical deficits and behaviors that affect risk of falls.   - [place] fall precautions as indicated by assessment.  - Educate pt/family on patient safety including physical limitations  - Instruct pt to call for assistance with activity based on assessment  - Modify environment to reduce risk of injury  - Provide assistive devices as appropriate  - Consider OT/PT consult to assist with strengthening/mobility  - Encourage toileting schedule  Outcome: Completed     Problem: NEUROLOGICAL - ADULT  Goal: Achieves stable or improved neurological status  Description: INTERVENTIONS  - Assess for and report changes in neurological status  - Initiate measures to prevent increased intracranial pressure  - Maintain blood pressure and fluid volume within ordered parameters to optimize cerebral perfusion and minimize risk of hemorrhage  - Monitor temperature, glucose, and sodium. Initiate appropriate interventions as ordered  Outcome: Completed  Goal: Absence of seizures  Description: INTERVENTIONS  - Monitor for seizure activity  - Administer anti-seizure medications as ordered  - Monitor neurological status  Outcome: Completed  Goal: Remains free of injury related to seizure activity  Description: INTERVENTIONS:  - Maintain airway, patient safety  and administer oxygen as ordered  - Monitor patient for seizure activity, document and report duration and description of seizure to MD/LIP  - If seizure occurs, turn patient to side and suction secretions as needed  - Reorient patient post seizure  - Seizure pads on all 4 side rails  - Instruct patient/family to notify RN of any seizure activity  - Instruct patient/family to call for assistance with activity based on assessment  Outcome: Completed  Goal: Achieves maximal functionality and self care  Description: INTERVENTIONS  - Monitor swallowing and airway patency with patient fatigue and changes in neurological status  - Encourage and assist patient to increase activity and self care with guidance from PT/OT  - Encourage visually impaired, hearing impaired and aphasic patients to use assistive/communication devices  Outcome: Completed     Reviewed discharge instructions,  medication list and follow up care with [facility] RN @ [facility].   Reinforced that patient is to be off anticoags and NSAIDS until cleared by MD

## 2022-02-20 NOTE — PLAN OF CARE
Problem: Patient Centered Care  Goal: Patient preferences are identified and integrated in the patient's plan of care  Description: Interventions:  - What would you like us to know as we care for you?  \"I recently fell on the ice\"  - Provide timely, complete, and accurate information to patient/family  - Incorporate patient and family knowledge, values, beliefs, and cultural backgrounds into the planning and delivery of care  - Encourage patient/family to participate in care and decision-making at the level they choose  - Honor patient and family perspectives and choices  Outcome: Progressing     Problem: Patient/Family Goals  Goal: Patient/Family Long Term Goal  Description: Patient's Long Term Goal: \"to go home\"    Interventions:  - See additional Care Plan goals for specific interventions  Outcome: Progressing  Goal: Patient/Family Short Term Goal  Description: Patient's Short Term Goal: \"to see my wife soon\"    Interventions:   - See additional Care Plan goals for specific interventions  Outcome: Progressing     Problem: PAIN - ADULT  Goal: Verbalizes/displays adequate comfort level or patient's stated pain goal  Description: INTERVENTIONS:  - Encourage pt to monitor pain and request assistance  - Assess pain using appropriate pain scale  - Administer analgesics based on type and severity of pain and evaluate response  - Implement non-pharmacological measures as appropriate and evaluate response  - Consider cultural and social influences on pain and pain management  - Manage/alleviate anxiety  - Utilize distraction and/or relaxation techniques  - Monitor for opioid side effects  - Notify MD/LIP if interventions unsuccessful or patient reports new pain  - Anticipate increased pain with activity and pre-medicate as appropriate  Outcome: Progressing     Problem: RISK FOR INFECTION - ADULT  Goal: Absence of fever/infection during anticipated neutropenic period  Description: INTERVENTIONS  - Monitor WBC  - Administer growth factors as ordered  - Implement neutropenic guidelines  Outcome: Progressing     Problem: SAFETY ADULT - FALL  Goal: Free from fall injury  Description: INTERVENTIONS:  - Assess pt frequently for physical needs  - Identify cognitive and physical deficits and behaviors that affect risk of falls. - [place] fall precautions as indicated by assessment.  - Educate pt/family on patient safety including physical limitations  - Instruct pt to call for assistance with activity based on assessment  - Modify environment to reduce risk of injury  - Provide assistive devices as appropriate  - Consider OT/PT consult to assist with strengthening/mobility  - Encourage toileting schedule  Outcome: Progressing     Problem: NEUROLOGICAL - ADULT  Goal: Achieves stable or improved neurological status  Description: INTERVENTIONS  - Assess for and report changes in neurological status  - Initiate measures to prevent increased intracranial pressure  - Maintain blood pressure and fluid volume within ordered parameters to optimize cerebral perfusion and minimize risk of hemorrhage  - Monitor temperature, glucose, and sodium.  Initiate appropriate interventions as ordered  Outcome: Progressing  Goal: Absence of seizures  Description: INTERVENTIONS  - Monitor for seizure activity  - Administer anti-seizure medications as ordered  - Monitor neurological status  Outcome: Progressing  Goal: Remains free of injury related to seizure activity  Description: INTERVENTIONS:  - Maintain airway, patient safety  and administer oxygen as ordered  - Monitor patient for seizure activity, document and report duration and description of seizure to MD/LIP  - If seizure occurs, turn patient to side and suction secretions as needed  - Reorient patient post seizure  - Seizure pads on all 4 side rails  - Instruct patient/family to notify RN of any seizure activity  - Instruct patient/family to call for assistance with activity based on assessment  Outcome: Progressing  Goal: Achieves maximal functionality and self care  Description: INTERVENTIONS  - Monitor swallowing and airway patency with patient fatigue and changes in neurological status  - Encourage and assist patient to increase activity and self care with guidance from PT/OT  - Encourage visually impaired, hearing impaired and aphasic patients to use assistive/communication devices  Outcome: Progressing     Patient neurologically intact. Denies headache. Plan for [facility] rehab on discharge, possibly tomorrow. High fall risk: bed low and in locked position, call light within reach, bed alarm on. VSS. Will continue to monitor.

## 2022-02-20 NOTE — CM/SW NOTE
02/20/22 1100   Discharge disposition   Expected discharge disposition Skilled Nurs   [facility] Provider [facility]   Discharge transportation [facility]   MIRACLE received for discharge to [facility]. CM contacted [facility], as they have a reserved a bed for patient. Patient and family updated on transfer to [facility]. PLAN:  [facility]   [address]  [facility], [address]      Nurse to Nurse report:  Phone: [phone] scheduled for  1pm  PCS entered in [facility] [address]    SW/CM to remain available for support and/or discharge planning.        Nghia Ross RN Case Manager   Ext. [phone]

## 2022-02-21 ENCOUNTER — OFFICE VISIT (OUTPATIENT)
Dept: INTERNAL MEDICINE CLINIC | Facility: CLINIC | Age: 87
End: 2022-02-21
Payer: MEDICARE

## 2022-02-21 VITALS
BODY MASS INDEX: 21.7 KG/M2 | WEIGHT: 155 LBS | RESPIRATION RATE: 14 BRPM | OXYGEN SATURATION: 95 % | HEIGHT: 71 IN | HEART RATE: 80 BPM | DIASTOLIC BLOOD PRESSURE: 85 MMHG | SYSTOLIC BLOOD PRESSURE: 138 MMHG

## 2022-02-21 DIAGNOSIS — I62.9 INTRACRANIAL HEMORRHAGE (HCC): ICD-10-CM

## 2022-02-21 DIAGNOSIS — H61.23 BILATERAL IMPACTED CERUMEN: ICD-10-CM

## 2022-02-21 DIAGNOSIS — I10 PRIMARY HYPERTENSION: ICD-10-CM

## 2022-02-21 DIAGNOSIS — I48.91 ATRIAL FIBRILLATION, UNSPECIFIED TYPE (HCC): Primary | ICD-10-CM

## 2022-02-21 PROBLEM — Z00.00 PHYSICAL EXAM, ANNUAL: Status: RESOLVED | Noted: 2021-03-24 | Resolved: 2022-02-21

## 2022-02-21 PROCEDURE — 99214 OFFICE O/P EST MOD 30 MIN: CPT | Performed by: INTERNAL MEDICINE

## 2022-02-21 PROCEDURE — 99306 1ST NF CARE HIGH MDM 50: CPT | Performed by: INTERNAL MEDICINE

## 2022-02-22 ENCOUNTER — TELEPHONE (OUTPATIENT)
Dept: OTOLARYNGOLOGY | Facility: CLINIC | Age: 87
End: 2022-02-22

## 2022-02-22 NOTE — TELEPHONE ENCOUNTER
Pt's daughter in law called. Pt is scheduled for an appointment on 2-24-22 for ear wax removal.   Is there something pt can purchase over the counter or be given a rx. To loosen the wax.   Please call

## 2022-02-23 ENCOUNTER — EXTERNAL FACILITY (OUTPATIENT)
Dept: INTERNAL MEDICINE CLINIC | Facility: CLINIC | Age: 87
End: 2022-02-23

## 2022-02-23 PROCEDURE — 99308 SBSQ NF CARE LOW MDM 20: CPT | Performed by: INTERNAL MEDICINE

## 2022-02-24 ENCOUNTER — OFFICE VISIT (OUTPATIENT)
Dept: OTOLARYNGOLOGY | Facility: CLINIC | Age: 87
End: 2022-02-24
Payer: MEDICARE

## 2022-02-24 VITALS — BODY MASS INDEX: 21.7 KG/M2 | TEMPERATURE: 98 F | HEIGHT: 71 IN | WEIGHT: 155 LBS

## 2022-02-24 DIAGNOSIS — H61.23 BILATERAL IMPACTED CERUMEN: Primary | ICD-10-CM

## 2022-02-24 PROCEDURE — 99213 OFFICE O/P EST LOW 20 MIN: CPT | Performed by: OTOLARYNGOLOGY

## 2022-02-28 PROCEDURE — 99308 SBSQ NF CARE LOW MDM 20: CPT | Performed by: INTERNAL MEDICINE

## 2022-03-01 ENCOUNTER — TELEPHONE (OUTPATIENT)
Dept: INTERNAL MEDICINE CLINIC | Facility: CLINIC | Age: 87
End: 2022-03-01
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
## 2022-03-01 DIAGNOSIS — S70.02XD CONTUSION OF LEFT HIP, SUBSEQUENT ENCOUNTER: ICD-10-CM

## 2022-03-01 DIAGNOSIS — I62.9 INTRACRANIAL HEMORRHAGE (HCC): Primary | ICD-10-CM

## 2022-04-03 ENCOUNTER — EXTERNAL FACILITY (OUTPATIENT)
Dept: INTERNAL MEDICINE CLINIC | Facility: CLINIC | Age: 87
End: 2022-04-03

## 2022-04-03 NOTE — PROGRESS NOTES
pt seen  at [facility]    seen in room no distress       HPI: pt was admitted after a fall in the drive way an dhit head , was found to have subarachnoid hemorrhage-he was on Xarelto. It has been reversed with Kcentra in the emergency room. Xarelto was held, see by N no intervention was needed. pt was dc'd to rehab         Diagnosis Date   Age-related nuclear cataract of both eyes 2021   [place], vitreous, bilateral 2021   Global amnesia 2010   per N minutes in [place]   Hypothyroid 2010   Inguinal hernia    Palpitations    toprol   Perforated ear drum    Prostate cancer ([facility] [address] 2007   seed implant    Past Surgical History:  Procedure Laterality Date   COLONOSCOPY    ELECTROCARDIOGRAM, COMPLETE 2012   scanned to media tab   INGUINAL HERNIA REPAIR    INNER EAR SURGERY PROC UNLISTED    mastoidectomy    Family History  Problem Relation Age of Onset   Gastro-Intestinal Disorder Father    diverticulitis   Heart Disease Mother    coronary artery disease (cause of death)   Arthritis Sister    Diabetes Maternal Grandmother    Glaucoma Neg    Macular degeneration Neg     Social History:  Social History   Tobacco Use   Smoking status: Never Smoker   Smokeless tobacco: Never Used   Alcohol use: No   Drug use: No    Allergies/Medications: Allergies:   Paxil [Paroxetine] RASH  Sulfacetamide    Co      Review of Systems:  no headache  no cp  no sob  no abd pain    vit: stable     obj:  cv s1 s2\  chest clear  abd soft  ext: from   neur: aao x 3 no fd        a/p  Intracranial hemorrhage (HCC)  -Acute subarachnoid hemorrhage-he was on Xarelto. It has been reversed with Kcentra in the emergency room. no intervention    Fall due to ice or snow, initial encounter  -Discussed fall prevention    Contusion of left hip and thigh, initial encounter  -X-ray of the hip unremarkable. he is feeling ok    Atrial fibrillation-Continue diltiazem.  can resume xarlto in 2 weeks     Dyslipidemia on statins    Hypertension-controlled.     Hypothyroidism on supplementation    History of prostate cancer    CKD-stable

## 2022-04-03 NOTE — PROGRESS NOTES
Pt seen 2/28/22 at [facility]    Doing ok over all will be dc'd this week     Review of Systems:  no headache  no cp  no sob  no abd pain    vit: stable     obj:  cv s1 s2\  chest clear  abd soft  ext: from   neur: aao x 3 no fd        a/p  Intracranial hemorrhage (HCC)  -Acute subarachnoid hemorrhage-he was on Xarelto. It has been reversed with Kcentra in the emergency room. no intervention    Fall due to ice or snow, initial encounter  -Discussed fall prevention    Contusion of left hip and thigh, initial encounter  -X-ray of the hip unremarkable. he is feeling ok    Atrial fibrillation-Continue diltiazem. can resume xarlto in 2 weeks     Dyslipidemia on statins    Hypertension-controlled.     Hypothyroidism on supplementation    History of prostate cancer    CKD-stable

## 2022-04-12 ENCOUNTER — NURSE TRIAGE (OUTPATIENT)
Dept: INTERNAL MEDICINE CLINIC | Facility: CLINIC | Age: 87
End: 2022-04-12

## 2022-04-12 LAB
AMB EXT COVID-19 RESULT: DETECTED
AMB EXT COVID-19 RESULT: DETECTED

## 2022-04-12 NOTE — TELEPHONE ENCOUNTER
I agree with ur recs   If there is any worsening symptoms - then ED     THE [facility]

## 2022-04-12 NOTE — TELEPHONE ENCOUNTER
Spoke to patient (verified name and ) as per provider's note. Patient voiced understanding and agrees with plan. Patient states his O2 level is still 98%, denies any sob or fever or chest pains. States that he only has runny nose and occasional cough. Patient was advised if symptoms worsen at any time before appointment to call 911 or to have someone take him to ER. Patient voiced understanding and agrees. Once again, patient declined virtual visit at this time, but will keep his 2 month follow up appointment with Dr. Nano Murphy on . Due to patient's Covid-19 diagnosis,  was changed from in office to virtual.     Patient  scheduled for video visit. Understands to follow the prompts and links to complete the visit. Patient advised that there may be a co-pay involved in this type of visit. Patient agreed to proceed, they understand the provider may be calling from a blocked, or unknown phone number on their caller ID and they know to answer the phone.     Best call back: [phone]    Future Appointments   Date Time Provider Chrissie Chacon   2022 10:30 AM Nisa Antoine MD [facility] Krystal Bynum

## 2022-04-18 ENCOUNTER — TELEPHONE (OUTPATIENT)
Dept: FAMILY MEDICINE CLINIC | Facility: CLINIC | Age: 87
End: 2022-04-18

## 2022-04-18 ENCOUNTER — TELEMEDICINE (OUTPATIENT)
Dept: INTERNAL MEDICINE CLINIC | Facility: CLINIC | Age: 87
End: 2022-04-18
Payer: MEDICARE

## 2022-04-18 DIAGNOSIS — U07.1 COVID-19: Primary | ICD-10-CM

## 2022-04-18 DIAGNOSIS — I60.9 SAH (SUBARACHNOID HEMORRHAGE) (HCC): ICD-10-CM

## 2022-04-18 PROCEDURE — 99213 OFFICE O/P EST LOW 20 MIN: CPT | Performed by: INTERNAL MEDICINE

## 2022-04-18 RX ORDER — LEVOTHYROXINE SODIUM 0.1 MG/1
100 TABLET ORAL
Qty: 90 TABLET | Refills: 3 | Status: SHIPPED | OUTPATIENT
Start: 2022-04-18

## 2022-04-18 RX ORDER — ATORVASTATIN CALCIUM 10 MG/1
10 TABLET, FILM COATED ORAL
Qty: 90 TABLET | Refills: 3 | Status: SHIPPED | OUTPATIENT
Start: 2022-04-18

## 2022-06-04 ENCOUNTER — TELEPHONE ENCOUNTER (OUTPATIENT)
Dept: URBAN - METROPOLITAN AREA CLINIC 68 | Facility: CLINIC | Age: 87
End: 2022-06-04

## 2022-06-05 ENCOUNTER — TELEPHONE ENCOUNTER (OUTPATIENT)
Dept: URBAN - METROPOLITAN AREA CLINIC 68 | Facility: CLINIC | Age: 87
End: 2022-06-05

## 2022-06-18 ENCOUNTER — HOSPITAL ENCOUNTER (OUTPATIENT)
Age: 87
Discharge: ANOTHER HOSPITAL | End: 2022-06-18
Payer: MEDICARE

## 2022-06-18 ENCOUNTER — HOSPITAL ENCOUNTER (EMERGENCY)
Facility: HOSPITAL | Age: 87
Discharge: HOME | End: 2022-06-18
Attending: EMERGENCY MEDICINE
Payer: MEDICARE

## 2022-06-18 ENCOUNTER — APPOINTMENT (OUTPATIENT)
Dept: CT IMAGING | Facility: HOSPITAL | Age: 87
End: 2022-06-18
Attending: EMERGENCY MEDICINE
Payer: MEDICARE

## 2022-06-18 VITALS
WEIGHT: 165 LBS | DIASTOLIC BLOOD PRESSURE: 77 MMHG | HEIGHT: 71 IN | RESPIRATION RATE: 12 BRPM | OXYGEN SATURATION: 99 % | HEART RATE: 65 BPM | SYSTOLIC BLOOD PRESSURE: 154 MMHG | BODY MASS INDEX: 23.1 KG/M2 | TEMPERATURE: 98 F

## 2022-06-18 VITALS
OXYGEN SATURATION: 98 % | SYSTOLIC BLOOD PRESSURE: 143 MMHG | TEMPERATURE: 97 F | RESPIRATION RATE: 18 BRPM | HEART RATE: 68 BPM | DIASTOLIC BLOOD PRESSURE: 80 MMHG | HEIGHT: 71 IN | WEIGHT: 160 LBS | BODY MASS INDEX: 22.4 KG/M2

## 2022-06-18 DIAGNOSIS — R53.1 WEAKNESS: Primary | ICD-10-CM

## 2022-06-18 DIAGNOSIS — U07.1 COVID-19: Primary | ICD-10-CM

## 2022-06-18 LAB
ANION GAP SERPL CALC-SCNC: 4 MMOL/L (ref 0–18)
BASOPHILS # BLD AUTO: 0.02 X10(3) UL (ref 0–0.2)
BASOPHILS NFR BLD AUTO: 0.3 %
BILIRUB UR QL: NEGATIVE
BUN BLD-MCNC: 29 MG/DL (ref 7–18)
BUN/CREAT SERPL: 18.5 (ref 10–20)
CALCIUM BLD-MCNC: 9.4 MG/DL (ref 8.5–10.1)
CHLORIDE SERPL-SCNC: 105 MMOL/L (ref 98–112)
CLARITY UR: CLEAR
CO2 SERPL-SCNC: 28 MMOL/L (ref 21–32)
CREAT BLD-MCNC: 1.57 MG/DL
DEPRECATED RDW RBC AUTO: 48.3 FL (ref 35.1–46.3)
EOSINOPHIL # BLD AUTO: 0.2 X10(3) UL (ref 0–0.7)
EOSINOPHIL NFR BLD AUTO: 2.8 %
ERYTHROCYTE [DISTWIDTH] IN BLOOD BY AUTOMATED COUNT: 13.7 % (ref 11–15)
GLUCOSE BLD-MCNC: 106 MG/DL (ref 70–99)
GLUCOSE UR-MCNC: NEGATIVE MG/DL
HCT VFR BLD AUTO: 39.2 %
HGB BLD-MCNC: 12.4 G/DL
IMM GRANULOCYTES # BLD AUTO: 0.01 X10(3) UL (ref 0–1)
IMM GRANULOCYTES NFR BLD: 0.1 %
KETONES UR-MCNC: NEGATIVE MG/DL
LEUKOCYTE ESTERASE UR QL STRIP.AUTO: NEGATIVE
LYMPHOCYTES # BLD AUTO: 1.09 X10(3) UL (ref 1–4)
LYMPHOCYTES NFR BLD AUTO: 15.1 %
MCH RBC QN AUTO: 30.2 PG (ref 26–34)
MCHC RBC AUTO-ENTMCNC: 31.6 G/DL (ref 31–37)
MCV RBC AUTO: 95.4 FL
MONOCYTES # BLD AUTO: 0.69 X10(3) UL (ref 0.1–1)
MONOCYTES NFR BLD AUTO: 9.5 %
NEUTROPHILS # BLD AUTO: 5.23 X10 (3) UL (ref 1.5–7.7)
NEUTROPHILS # BLD AUTO: 5.23 X10(3) UL (ref 1.5–7.7)
NEUTROPHILS NFR BLD AUTO: 72.2 %
NITRITE UR QL STRIP.AUTO: NEGATIVE
OSMOLALITY SERPL CALC.SUM OF ELEC: 290 MOSM/KG (ref 275–295)
PH UR: 6.5 [PH] (ref 5–8)
PLATELET # BLD AUTO: 264 10(3)UL (ref 150–450)
POTASSIUM SERPL-SCNC: 3.9 MMOL/L (ref 3.5–5.1)
PROT UR-MCNC: NEGATIVE MG/DL
RBC # BLD AUTO: 4.11 X10(6)UL
SARS-COV-2 RNA RESP QL NAA+PROBE: DETECTED
SODIUM SERPL-SCNC: 137 MMOL/L (ref 136–145)
SP GR UR STRIP: 1.01 (ref 1–1.03)
T4 FREE SERPL-MCNC: 1 NG/DL (ref 0.8–1.7)
TSI SER-ACNC: 7.36 MIU/ML (ref 0.36–3.74)
UROBILINOGEN UR STRIP-ACNC: 0.2
WBC # BLD AUTO: 7.2 X10(3) UL (ref 4–11)

## 2022-06-18 PROCEDURE — 99284 EMERGENCY DEPT VISIT MOD MDM: CPT

## 2022-06-18 PROCEDURE — 99285 EMERGENCY DEPT VISIT HI MDM: CPT

## 2022-06-18 PROCEDURE — 84439 ASSAY OF FREE THYROXINE: CPT | Performed by: EMERGENCY MEDICINE

## 2022-06-18 PROCEDURE — 80048 BASIC METABOLIC PNL TOTAL CA: CPT

## 2022-06-18 PROCEDURE — 85025 COMPLETE CBC W/AUTO DIFF WBC: CPT | Performed by: EMERGENCY MEDICINE

## 2022-06-18 PROCEDURE — 80048 BASIC METABOLIC PNL TOTAL CA: CPT | Performed by: EMERGENCY MEDICINE

## 2022-06-18 PROCEDURE — 99205 OFFICE O/P NEW HI 60 MIN: CPT | Performed by: NURSE PRACTITIONER

## 2022-06-18 PROCEDURE — 84443 ASSAY THYROID STIM HORMONE: CPT | Performed by: EMERGENCY MEDICINE

## 2022-06-18 PROCEDURE — 70450 CT HEAD/BRAIN W/O DYE: CPT | Performed by: EMERGENCY MEDICINE

## 2022-06-18 PROCEDURE — 81001 URINALYSIS AUTO W/SCOPE: CPT | Performed by: EMERGENCY MEDICINE

## 2022-06-18 PROCEDURE — 93000 ELECTROCARDIOGRAM COMPLETE: CPT | Performed by: NURSE PRACTITIONER

## 2022-06-18 PROCEDURE — 85025 COMPLETE CBC W/AUTO DIFF WBC: CPT

## 2022-06-18 PROCEDURE — 81015 MICROSCOPIC EXAM OF URINE: CPT | Performed by: EMERGENCY MEDICINE

## 2022-06-18 RX ORDER — BEBTELOVIMAB 87.5 MG/ML
175 INJECTION, SOLUTION INTRAVENOUS ONCE
Status: DISCONTINUED | OUTPATIENT
Start: 2022-06-18 | End: 2022-06-18

## 2022-06-18 RX ORDER — BEBTELOVIMAB 87.5 MG/ML
175 INJECTION, SOLUTION INTRAVENOUS ONCE
Status: COMPLETED | OUTPATIENT
Start: 2022-06-18 | End: 2022-06-18

## 2022-06-18 NOTE — ED QUICK NOTES
Pts daughter in law called for update  Wanted to let ER know pt lives at home alone and cannot be discharged if he is unable to walk or take care of himself today    [address] (son)  Amisha Maurer  (daughter in law)

## 2022-06-18 NOTE — ED PROVIDER NOTES
Signout taken with disposition pending CTH in setting of COVID positivity with patient receiving antibody infusion - imaging nonacute, stable for discharge as previously anticipated in patient anxious for same. CT BRAIN OR HEAD (68851)    Result Date: 6/18/2022  PROCEDURE: CT BRAIN OR HEAD (CPT=70450)  COMPARISON: [facility], CT BRAIN OR HEAD (CPT=70450), 2/17/2022, 8:34 AM.  [facility], CT BRAIN OR HEAD (CPT=70450), 2/18/2022, 6:56 AM.  INDICATIONS: Weakness, lightheadedness, confusion. TECHNIQUE: CT images were obtained without contrast material.  Automated exposure control for dose reduction was used. Dose information is transmitted to the [facility]) NRDR ([address]) which includes the Dose Index Registry. FINDINGS:  CSF SPACES: No hydrocephalus, subarachnoid hemorrhage, or effacement of the basal cisterns is appreciated. There is no extra-axial fluid collection. CEREBRUM: No acute intraparenchymal hemorrhage, edema, or cortical sulcal effacement is apparent. There is no space-occupying lesion, mass effect, or shift of midline structures. The gray-white matter junction is preserved and bilaterally symmetric in appearance. Mild scattered hypodense foci noted in the subcortical and periventricular white matter of both cerebral hemispheres. Stable suspected bilateral subinsular chronic lacunar infarcts. CEREBELLUM: No edema, hemorrhage, mass, or acute infarction is seen. BRAINSTEM: No edema, hemorrhage, mass, or acute infarction is seen. CALVARIUM: There is no apparent depressed fracture, mass, or other significant visible lesion. SINUSES: Chronic near complete opacification of the right mastoid cavity with minimal opacification of the middle ear cavity. ORBITS: Limited views are grossly unremarkable. OTHER: Negative. CONCLUSION:  1. No acute intracranial process by noncontrast CT technique.  2. Previously noted small volume acute subarachnoid hemorrhage within the sulci of the right parietal lobe on head CT in February, 2022 has resolved. 3. Mild nonspecific white matter changes involving both cerebral hemispheres that most likely reflect sequelae of chronic microangiopathy. 4. Stable chronic near complete opacification of the right mastoid cavity. 5. Lesser incidental findings as above.    Dictated by (CST): Rosa Solis MD on 6/18/2022 at 5:10 PM     Finalized by (CST): Rosa Solis MD on 6/18/2022 at 5:13 PM

## 2022-06-18 NOTE — ED INITIAL ASSESSMENT (HPI)
Pt reports generalized weakness, lightheadedness, bilateral ear tinnitus which started yesterday. Pt swims 5 days a week, reports symptoms started when getting out of the pool. Symptoms improved yesterday and then returned today after getting out shower. befast negative. Covid positive in may.

## 2022-06-20 ENCOUNTER — TELEPHONE (OUTPATIENT)
Dept: INTERNAL MEDICINE CLINIC | Facility: CLINIC | Age: 87
End: 2022-06-20

## 2022-06-20 NOTE — TELEPHONE ENCOUNTER
Home Monitoring Day 1. What  was your temp today? - 5pm  yesterday 98 /70,     How did you take your temp?     with an oral thermometer    What was your pulse ox today?  02 sat is 98 % pulse 73    Are you feeling short of breath today? No      Is the shortness of breath better, the same, or worse than yesterday? Are you having a cough today? No      Is the cough better, the same, or worse than yesterday?    n/a    Are you experiencing weakness today? No      Is the weakness better, the same or worse than yesterday? better    How is your appetite compared to yesterday? better    Are you vomiting? No    Are you experiencing diarrhea? No    Any loss of taste or smell? No      Nursing notes:  Patient states he is feeling better, only symptom was slight headache and in general \"not feeling well\". Patient states that was on Saturday.  Today patient states he is feeling \"strong\"

## 2022-06-20 NOTE — TELEPHONE ENCOUNTER
Patient asking if he can get a handicapped placard. Patient states he has an upcoming physical appointment August 8th and he will need placard before then. Patient states he has difficulty walking a long distance to his car. Please advise.

## 2022-06-23 NOTE — TELEPHONE ENCOUNTER
ESVIN I spoke with patient who stated he will bring the Placard Form when he comes to see you on 8/8/22.

## 2022-06-25 ENCOUNTER — TELEPHONE ENCOUNTER (OUTPATIENT)
Age: 87
End: 2022-06-25

## 2022-06-26 ENCOUNTER — TELEPHONE ENCOUNTER (OUTPATIENT)
Age: 87
End: 2022-06-26

## 2022-07-27 ENCOUNTER — MED REC SCAN ONLY (OUTPATIENT)
Dept: INTERNAL MEDICINE CLINIC | Facility: CLINIC | Age: 87
End: 2022-07-27

## 2022-08-08 ENCOUNTER — OFFICE VISIT (OUTPATIENT)
Dept: INTERNAL MEDICINE CLINIC | Facility: CLINIC | Age: 87
End: 2022-08-08
Payer: MEDICARE

## 2022-08-08 VITALS
HEIGHT: 71 IN | BODY MASS INDEX: 22.96 KG/M2 | WEIGHT: 164 LBS | SYSTOLIC BLOOD PRESSURE: 138 MMHG | DIASTOLIC BLOOD PRESSURE: 70 MMHG | HEART RATE: 66 BPM

## 2022-08-08 DIAGNOSIS — N18.31 STAGE 3A CHRONIC KIDNEY DISEASE (HCC): ICD-10-CM

## 2022-08-08 DIAGNOSIS — E03.9 ACQUIRED HYPOTHYROIDISM: ICD-10-CM

## 2022-08-08 DIAGNOSIS — H25.13 AGE-RELATED NUCLEAR CATARACT OF BOTH EYES: ICD-10-CM

## 2022-08-08 DIAGNOSIS — M15.9 OSTEOARTHRITIS OF MULTIPLE JOINTS, UNSPECIFIED OSTEOARTHRITIS TYPE: ICD-10-CM

## 2022-08-08 DIAGNOSIS — Z86.79 HISTORY OF ORTHOSTATIC HYPOTENSION: ICD-10-CM

## 2022-08-08 DIAGNOSIS — I62.9 INTRACRANIAL HEMORRHAGE (HCC): ICD-10-CM

## 2022-08-08 DIAGNOSIS — H91.93 BILATERAL HEARING LOSS, UNSPECIFIED HEARING LOSS TYPE: ICD-10-CM

## 2022-08-08 DIAGNOSIS — J43.8 OTHER EMPHYSEMA (HCC): ICD-10-CM

## 2022-08-08 DIAGNOSIS — E78.2 MIXED HYPERLIPIDEMIA: Primary | ICD-10-CM

## 2022-08-08 DIAGNOSIS — H43.393 FLOATER, VITREOUS, BILATERAL: ICD-10-CM

## 2022-08-08 DIAGNOSIS — I10 PRIMARY HYPERTENSION: ICD-10-CM

## 2022-08-08 DIAGNOSIS — C61 MALIGNANT NEOPLASM OF PROSTATE (HCC): ICD-10-CM

## 2022-08-08 DIAGNOSIS — I48.0 PAROXYSMAL ATRIAL FIBRILLATION (HCC): ICD-10-CM

## 2022-08-08 DIAGNOSIS — I70.0 ATHEROSCLEROSIS OF AORTA (HCC): ICD-10-CM

## 2022-08-08 DIAGNOSIS — R55 SYNCOPE AND COLLAPSE: ICD-10-CM

## 2022-08-08 PROBLEM — S70.02XA CONTUSION OF LEFT HIP AND THIGH, INITIAL ENCOUNTER: Status: RESOLVED | Noted: 2022-02-17 | Resolved: 2022-08-08

## 2022-08-08 PROBLEM — R04.0 EPISTAXIS: Status: RESOLVED | Noted: 2021-03-24 | Resolved: 2022-08-08

## 2022-08-08 PROBLEM — R05.9 COUGH: Status: RESOLVED | Noted: 2020-02-05 | Resolved: 2022-08-08

## 2022-08-08 PROBLEM — S70.12XA CONTUSION OF LEFT HIP AND THIGH, INITIAL ENCOUNTER: Status: RESOLVED | Noted: 2022-02-17 | Resolved: 2022-08-08

## 2022-08-08 RX ORDER — AMLODIPINE BESYLATE 5 MG/1
5 TABLET ORAL DAILY
COMMUNITY

## 2022-09-01 ENCOUNTER — HOSPITAL ENCOUNTER (EMERGENCY)
Facility: HOSPITAL | Age: 87
Discharge: HOME | End: 2022-09-01
Attending: EMERGENCY MEDICINE
Payer: MEDICARE

## 2022-09-01 VITALS
WEIGHT: 160 LBS | RESPIRATION RATE: 20 BRPM | DIASTOLIC BLOOD PRESSURE: 66 MMHG | HEIGHT: 71 IN | BODY MASS INDEX: 22.4 KG/M2 | HEART RATE: 58 BPM | TEMPERATURE: 97 F | SYSTOLIC BLOOD PRESSURE: 140 MMHG | OXYGEN SATURATION: 95 %

## 2022-09-01 DIAGNOSIS — K29.00 ACUTE GASTRITIS WITHOUT HEMORRHAGE, UNSPECIFIED GASTRITIS TYPE: ICD-10-CM

## 2022-09-01 DIAGNOSIS — R11.2 NAUSEA AND VOMITING, UNSPECIFIED VOMITING TYPE: Primary | ICD-10-CM

## 2022-09-01 DIAGNOSIS — R53.1 WEAKNESS GENERALIZED: ICD-10-CM

## 2022-09-01 LAB
ALBUMIN SERPL-MCNC: 3.3 G/DL (ref 3.4–5)
ALP LIVER SERPL-CCNC: 65 U/L
ALT SERPL-CCNC: 24 U/L
ANION GAP SERPL CALC-SCNC: 8 MMOL/L (ref 0–18)
AST SERPL-CCNC: 29 U/L (ref 15–37)
BASOPHILS # BLD AUTO: 0.03 X10(3) UL (ref 0–0.2)
BASOPHILS NFR BLD AUTO: 0.4 %
BILIRUB DIRECT SERPL-MCNC: 0.1 MG/DL (ref 0–0.2)
BILIRUB SERPL-MCNC: 0.6 MG/DL (ref 0.1–2)
BILIRUB UR QL: NEGATIVE
BUN BLD-MCNC: 27 MG/DL (ref 7–18)
BUN/CREAT SERPL: 16.2 (ref 10–20)
CALCIUM BLD-MCNC: 9.5 MG/DL (ref 8.5–10.1)
CHLORIDE SERPL-SCNC: 102 MMOL/L (ref 98–112)
CLARITY UR: CLEAR
CO2 SERPL-SCNC: 26 MMOL/L (ref 21–32)
COLOR UR: YELLOW
CREAT BLD-MCNC: 1.67 MG/DL
DEPRECATED RDW RBC AUTO: 45.1 FL (ref 35.1–46.3)
EOSINOPHIL # BLD AUTO: 0.14 X10(3) UL (ref 0–0.7)
EOSINOPHIL NFR BLD AUTO: 2.1 %
ERYTHROCYTE [DISTWIDTH] IN BLOOD BY AUTOMATED COUNT: 13.2 % (ref 11–15)
GFR SERPLBLD BASED ON 1.73 SQ M-ARVRAT: 38 ML/MIN/1.73M2 (ref 60–?)
GLUCOSE BLD-MCNC: 118 MG/DL (ref 70–99)
GLUCOSE UR-MCNC: NEGATIVE MG/DL
HCT VFR BLD AUTO: 38.7 %
HGB BLD-MCNC: 12.7 G/DL
HYALINE CASTS #/AREA URNS AUTO: PRESENT /LPF
HYALINE CASTS #/AREA URNS AUTO: PRESENT /LPF
IMM GRANULOCYTES # BLD AUTO: 0.02 X10(3) UL (ref 0–1)
IMM GRANULOCYTES NFR BLD: 0.3 %
KETONES UR-MCNC: NEGATIVE MG/DL
LEUKOCYTE ESTERASE UR QL STRIP.AUTO: NEGATIVE
LYMPHOCYTES # BLD AUTO: 1.13 X10(3) UL (ref 1–4)
LYMPHOCYTES NFR BLD AUTO: 16.6 %
MCH RBC QN AUTO: 30.5 PG (ref 26–34)
MCHC RBC AUTO-ENTMCNC: 32.8 G/DL (ref 31–37)
MCV RBC AUTO: 93 FL
MONOCYTES # BLD AUTO: 0.68 X10(3) UL (ref 0.1–1)
MONOCYTES NFR BLD AUTO: 10 %
NEUTROPHILS # BLD AUTO: 4.82 X10 (3) UL (ref 1.5–7.7)
NEUTROPHILS # BLD AUTO: 4.82 X10(3) UL (ref 1.5–7.7)
NEUTROPHILS NFR BLD AUTO: 70.6 %
NITRITE UR QL STRIP.AUTO: NEGATIVE
OSMOLALITY SERPL CALC.SUM OF ELEC: 288 MOSM/KG (ref 275–295)
PH UR: 7.5 [PH] (ref 5–8)
PLATELET # BLD AUTO: 263 10(3)UL (ref 150–450)
POTASSIUM SERPL-SCNC: 4.1 MMOL/L (ref 3.5–5.1)
PROT SERPL-MCNC: 7.5 G/DL (ref 6.4–8.2)
PROT UR-MCNC: NEGATIVE MG/DL
RBC # BLD AUTO: 4.16 X10(6)UL
SARS-COV-2 RNA RESP QL NAA+PROBE: NOT DETECTED
SODIUM SERPL-SCNC: 136 MMOL/L (ref 136–145)
SP GR UR STRIP: 1.02 (ref 1–1.03)
TROPONIN I HIGH SENSITIVITY: 12 NG/L
UROBILINOGEN UR STRIP-ACNC: 0.2
WBC # BLD AUTO: 6.8 X10(3) UL (ref 4–11)

## 2022-09-01 PROCEDURE — 85025 COMPLETE CBC W/AUTO DIFF WBC: CPT | Performed by: EMERGENCY MEDICINE

## 2022-09-01 PROCEDURE — 80048 BASIC METABOLIC PNL TOTAL CA: CPT | Performed by: EMERGENCY MEDICINE

## 2022-09-01 PROCEDURE — 84484 ASSAY OF TROPONIN QUANT: CPT | Performed by: EMERGENCY MEDICINE

## 2022-09-01 PROCEDURE — 80076 HEPATIC FUNCTION PANEL: CPT | Performed by: EMERGENCY MEDICINE

## 2022-09-01 PROCEDURE — 96360 HYDRATION IV INFUSION INIT: CPT

## 2022-09-01 PROCEDURE — 81001 URINALYSIS AUTO W/SCOPE: CPT | Performed by: EMERGENCY MEDICINE

## 2022-09-01 PROCEDURE — 99284 EMERGENCY DEPT VISIT MOD MDM: CPT

## 2022-09-01 PROCEDURE — 81015 MICROSCOPIC EXAM OF URINE: CPT | Performed by: EMERGENCY MEDICINE

## 2022-09-01 RX ORDER — ONDANSETRON 2 MG/ML
4 INJECTION INTRAMUSCULAR; INTRAVENOUS ONCE
Status: DISCONTINUED | OUTPATIENT
Start: 2022-09-01 | End: 2022-09-01

## 2022-09-01 RX ORDER — ONDANSETRON 4 MG/1
4 TABLET, ORALLY DISINTEGRATING ORAL EVERY 4 HOURS PRN
Qty: 12 TABLET | Refills: 0 | Status: SHIPPED | OUTPATIENT
Start: 2022-09-01 | End: 2022-09-08

## 2022-09-01 NOTE — ED INITIAL ASSESSMENT (HPI)
Pt arrived per EMS from home. Pt states nausea/vomiting and weakness since this morning after breakfast. EMS states vomited x 2 in route. Pt is a/o x 3.

## 2022-10-26 ENCOUNTER — TELEPHONE (OUTPATIENT)
Dept: INTERNAL MEDICINE CLINIC | Facility: CLINIC | Age: 87
End: 2022-10-26

## 2022-11-25 ENCOUNTER — LAB ENCOUNTER (OUTPATIENT)
Dept: LAB | Facility: HOSPITAL | Age: 87
End: 2022-11-25
Attending: INTERNAL MEDICINE
Payer: MEDICARE

## 2022-11-25 DIAGNOSIS — I10 PRIMARY HYPERTENSION: ICD-10-CM

## 2022-11-25 DIAGNOSIS — C61 MALIGNANT NEOPLASM OF PROSTATE (HCC): ICD-10-CM

## 2022-11-25 DIAGNOSIS — E78.2 MIXED HYPERLIPIDEMIA: ICD-10-CM

## 2022-11-25 LAB
ALBUMIN SERPL-MCNC: 3.6 G/DL (ref 3.4–5)
ALBUMIN/GLOB SERPL: 0.9 {RATIO} (ref 1–2)
ALP LIVER SERPL-CCNC: 76 U/L
ALT SERPL-CCNC: 34 U/L
ANION GAP SERPL CALC-SCNC: 6 MMOL/L (ref 0–18)
AST SERPL-CCNC: 31 U/L (ref 15–37)
BILIRUB SERPL-MCNC: 0.7 MG/DL (ref 0.1–2)
BUN BLD-MCNC: 31 MG/DL (ref 7–18)
BUN/CREAT SERPL: 20.4 (ref 10–20)
CALCIUM BLD-MCNC: 9.3 MG/DL (ref 8.5–10.1)
CHLORIDE SERPL-SCNC: 104 MMOL/L (ref 98–112)
CHOLEST SERPL-MCNC: 138 MG/DL (ref ?–200)
CO2 SERPL-SCNC: 30 MMOL/L (ref 21–32)
CREAT BLD-MCNC: 1.52 MG/DL
DEPRECATED RDW RBC AUTO: 45.6 FL (ref 35.1–46.3)
ERYTHROCYTE [DISTWIDTH] IN BLOOD BY AUTOMATED COUNT: 13 % (ref 11–15)
FASTING PATIENT LIPID ANSWER: NO
FASTING STATUS PATIENT QL REPORTED: NO
GFR SERPLBLD BASED ON 1.73 SQ M-ARVRAT: 42 ML/MIN/1.73M2 (ref 60–?)
GLOBULIN PLAS-MCNC: 4.1 G/DL (ref 2.8–4.4)
GLUCOSE BLD-MCNC: 82 MG/DL (ref 70–99)
HCT VFR BLD AUTO: 40.6 %
HDLC SERPL-MCNC: 82 MG/DL (ref 40–59)
HGB BLD-MCNC: 13.2 G/DL
LDLC SERPL CALC-MCNC: 45 MG/DL (ref ?–100)
MCH RBC QN AUTO: 30.8 PG (ref 26–34)
MCHC RBC AUTO-ENTMCNC: 32.5 G/DL (ref 31–37)
MCV RBC AUTO: 94.9 FL
NONHDLC SERPL-MCNC: 56 MG/DL (ref ?–130)
OSMOLALITY SERPL CALC.SUM OF ELEC: 296 MOSM/KG (ref 275–295)
PLATELET # BLD AUTO: 261 10(3)UL (ref 150–450)
POTASSIUM SERPL-SCNC: 3.8 MMOL/L (ref 3.5–5.1)
PROT SERPL-MCNC: 7.7 G/DL (ref 6.4–8.2)
PSA SERPL-MCNC: <0.01 NG/ML (ref ?–4)
RBC # BLD AUTO: 4.28 X10(6)UL
SODIUM SERPL-SCNC: 140 MMOL/L (ref 136–145)
T4 FREE SERPL-MCNC: 1.2 NG/DL (ref 0.8–1.7)
TRIGL SERPL-MCNC: 46 MG/DL (ref 30–149)
TSI SER-ACNC: 6.16 MIU/ML (ref 0.36–3.74)
VLDLC SERPL CALC-MCNC: 6 MG/DL (ref 0–30)
WBC # BLD AUTO: 8.3 X10(3) UL (ref 4–11)

## 2022-11-25 PROCEDURE — 36415 COLL VENOUS BLD VENIPUNCTURE: CPT

## 2022-11-25 PROCEDURE — 80053 COMPREHEN METABOLIC PANEL: CPT

## 2022-11-25 PROCEDURE — 84443 ASSAY THYROID STIM HORMONE: CPT

## 2022-11-25 PROCEDURE — 84439 ASSAY OF FREE THYROXINE: CPT

## 2022-11-25 PROCEDURE — 84153 ASSAY OF PSA TOTAL: CPT

## 2022-11-25 PROCEDURE — 80061 LIPID PANEL: CPT

## 2022-11-25 PROCEDURE — 85027 COMPLETE CBC AUTOMATED: CPT

## 2022-11-30 ENCOUNTER — OFFICE VISIT (OUTPATIENT)
Dept: INTERNAL MEDICINE CLINIC | Facility: CLINIC | Age: 87
End: 2022-11-30
Payer: MEDICARE

## 2022-11-30 VITALS
HEART RATE: 66 BPM | RESPIRATION RATE: 14 BRPM | BODY MASS INDEX: 23.24 KG/M2 | HEIGHT: 71 IN | DIASTOLIC BLOOD PRESSURE: 60 MMHG | SYSTOLIC BLOOD PRESSURE: 122 MMHG | WEIGHT: 166 LBS | OXYGEN SATURATION: 97 %

## 2022-11-30 DIAGNOSIS — E78.2 MIXED HYPERLIPIDEMIA: ICD-10-CM

## 2022-11-30 DIAGNOSIS — I48.0 PAROXYSMAL ATRIAL FIBRILLATION (HCC): Primary | ICD-10-CM

## 2022-11-30 DIAGNOSIS — I10 PRIMARY HYPERTENSION: ICD-10-CM

## 2022-11-30 DIAGNOSIS — E03.9 ACQUIRED HYPOTHYROIDISM: ICD-10-CM

## 2022-11-30 PROCEDURE — 99214 OFFICE O/P EST MOD 30 MIN: CPT | Performed by: INTERNAL MEDICINE

## 2022-11-30 PROCEDURE — 1126F AMNT PAIN NOTED NONE PRSNT: CPT | Performed by: INTERNAL MEDICINE

## 2022-11-30 RX ORDER — PANTOPRAZOLE SODIUM 40 MG/1
40 TABLET, DELAYED RELEASE ORAL
Qty: 30 TABLET | Refills: 0 | Status: SHIPPED | OUTPATIENT
Start: 2022-11-30 | End: 2022-12-30

## 2022-12-23 ENCOUNTER — TELEPHONE (OUTPATIENT)
Dept: INTERNAL MEDICINE CLINIC | Facility: CLINIC | Age: 87
End: 2022-12-23

## 2022-12-23 RX ORDER — PANTOPRAZOLE SODIUM 40 MG/1
40 TABLET, DELAYED RELEASE ORAL
Qty: 90 TABLET | Refills: 0 | Status: CANCELLED | OUTPATIENT
Start: 2022-12-23

## 2022-12-23 NOTE — TELEPHONE ENCOUNTER
The patient called back and stated he has not needed for 3 weeks. He does not need refilled. He is to take as needed and has not taken.

## 2022-12-23 NOTE — TELEPHONE ENCOUNTER
As only filled once on 11/30/22. I left a message to call us back. We need to know if it is working. Dr. Anni Morales is out of the office and we will need to send to the provider at the office.

## 2023-01-30 RX ORDER — PANTOPRAZOLE SODIUM 40 MG/1
40 TABLET, DELAYED RELEASE ORAL
Qty: 90 TABLET | Refills: 1 | Status: SHIPPED | OUTPATIENT
Start: 2023-01-30

## 2023-01-30 NOTE — TELEPHONE ENCOUNTER
last ref 11/30/2022 # 30  pls advise, thanks in advance.        Refill passed per [facility] protocol   Requested Prescriptions   Pending Prescriptions Disp Refills    PANTOPRAZOLE 40 MG Oral Tab EC [Pharmacy Med Name: PANTOPRAZOLE SOD DR 40 MG TAB] 30 tablet 0     Sig: TAKE 1 TABLET BY MOUTH EVERY DAY IN THE MORNING BEFORE BREAKFAST       Gastrointestional Medication Protocol Passed - 1/30/2023  9:07 AM        Passed - In person appointment or virtual visit in the past 12 mos or appointment in next 3 mos     Recent Outpatient Visits              2 months ago Paroxysmal atrial fibrillation [facility])    [address], [address] Perri Mcbride MD    Office Visit    5 months ago Mixed hyperlipidemia    Perri Bishop MD    Office Visit    9 months ago COVID-19    [address], [address] Perri Mcbride MD    Telemedicine    11 months ago Bilateral impacted cerumen    [address], [address] [facility]    Office Visit    11 months ago Atrial fibrillation, unspecified type [facility])    Perri Bishop MD    Office Visit          Future Appointments         Provider Department Appt Notes    [facility], [address], [facility]

## 2023-02-10 RX ORDER — LEVOTHYROXINE SODIUM 0.1 MG/1
TABLET ORAL
Qty: 90 TABLET | Refills: 3 | Status: SHIPPED | OUTPATIENT
Start: 2023-02-10

## 2023-02-10 NOTE — TELEPHONE ENCOUNTER
Protocol failed or has No Protocol, please review  Requested Prescriptions   Pending Prescriptions Disp Refills    LEVOTHYROXINE 100 MCG Oral Tab [Pharmacy Med Name: LEVOTHYROXINE 100 MCG TABLET] 90 tablet 3     Sig: TAKE 1 TABLET BY MOUTH BEFORE BREAKFAST.        Thyroid Medication Protocol Failed - 2/10/2023  8:46 AM        Failed - Last TSH value is normal     Lab Results   Component Value Date    TSH 6.160 (H) 11/25/2022                 Passed - TSH in past 12 months        Passed - In person appointment or virtual visit in the past 12 mos or appointment in next 3 mos     Recent Outpatient Visits              2 months ago Paroxysmal atrial fibrillation [facility])    [address], [address] [place] [address] Pan Schafer MD    Office Visit    6 months ago Mixed hyperlipidemia    [address] [facility] Pan Schafer MD    Office Visit    9 months ago COVID-19    [address], [address] [place] [address] Pan Schafer MD    Telemedicine    11 months ago Bilateral impacted cerumen    [address], [address], [facility], Lesia Rodriguez MD    Office Visit    11 months ago Atrial fibrillation, unspecified type [facility])    [address] [facility], [address] Pan Schafer MD    Office Visit          Future Appointments         Provider Department Appt Notes    In 3 months Juliette Lucio MD [address], [address], [place] [facility] ee                  Future Appointments         Provider Department Appt Notes    In 3 months Juliette Lucio MD [address], [address], [place] [facility]          Recent Outpatient Visits              2 months ago Paroxysmal atrial fibrillation [facility])    [facility], [address] Pan Schafer MD    Office Visit    6 months ago Mixed hyperlipidemia    [address], [address] [facility] Jazmin Danielle MD    Office Visit    9 months ago COVID-19    Holly Gonsalves MD    Telemedicine    11 months ago Bilateral impacted cerumen    [address] [place] [address], Kyara Carreon MD    Office Visit    11 months ago Atrial fibrillation, unspecified type [facility])    Holly Gonsalves MD    Office Visit

## 2023-03-31 ENCOUNTER — HOSPITAL ENCOUNTER (OUTPATIENT)
Age: 88
Discharge: HOME | End: 2023-03-31
Payer: MEDICARE

## 2023-03-31 VITALS
TEMPERATURE: 97 F | HEART RATE: 72 BPM | DIASTOLIC BLOOD PRESSURE: 78 MMHG | RESPIRATION RATE: 20 BRPM | SYSTOLIC BLOOD PRESSURE: 144 MMHG | OXYGEN SATURATION: 99 %

## 2023-03-31 DIAGNOSIS — N18.9 CHRONIC KIDNEY DISEASE, UNSPECIFIED CKD STAGE: ICD-10-CM

## 2023-03-31 DIAGNOSIS — S80.12XA TRAUMATIC HEMATOMA OF LEFT LOWER LEG, INITIAL ENCOUNTER: ICD-10-CM

## 2023-03-31 DIAGNOSIS — Z79.01 ANTICOAGULATED: ICD-10-CM

## 2023-03-31 DIAGNOSIS — L03.116 CELLULITIS OF LEFT LOWER EXTREMITY: Primary | ICD-10-CM

## 2023-03-31 DIAGNOSIS — I48.0 PAROXYSMAL ATRIAL FIBRILLATION (HCC): ICD-10-CM

## 2023-03-31 LAB
#MXD IC: 0.9 X10ˆ3/UL (ref 0.1–1)
BUN BLD-MCNC: 29 MG/DL (ref 7–18)
CHLORIDE BLD-SCNC: 105 MMOL/L (ref 98–112)
CO2 BLD-SCNC: 27 MMOL/L (ref 21–32)
CREAT BLD-MCNC: 1.4 MG/DL
GFR SERPLBLD BASED ON 1.73 SQ M-ARVRAT: 46 ML/MIN/1.73M2 (ref 60–?)
GLUCOSE BLD-MCNC: 122 MG/DL (ref 70–99)
HCT VFR BLD AUTO: 37.1 %
HCT VFR BLD CALC: 38 %
HGB BLD-MCNC: 12.2 G/DL
ISTAT IONIZED CALCIUM FOR CHEM 8: 1.3 MMOL/L (ref 1.12–1.32)
LYMPHOCYTES # BLD AUTO: 1.2 X10ˆ3/UL (ref 1–4)
LYMPHOCYTES NFR BLD AUTO: 16.7 %
MCH RBC QN AUTO: 30.3 PG (ref 26–34)
MCHC RBC AUTO-ENTMCNC: 32.9 G/DL (ref 31–37)
MCV RBC AUTO: 92.3 FL (ref 80–100)
MIXED CELL %: 13.7 %
NEUTROPHILS # BLD AUTO: 4.8 X10ˆ3/UL (ref 1.5–7.7)
NEUTROPHILS NFR BLD AUTO: 69.6 %
PLATELET # BLD AUTO: 242 X10ˆ3/UL (ref 150–450)
POTASSIUM BLD-SCNC: 3.9 MMOL/L (ref 3.6–5.1)
RBC # BLD AUTO: 4.02 X10ˆ6/UL
SODIUM BLD-SCNC: 141 MMOL/L (ref 136–145)
WBC # BLD AUTO: 6.9 X10ˆ3/UL (ref 4–11)

## 2023-03-31 PROCEDURE — 36415 COLL VENOUS BLD VENIPUNCTURE: CPT | Performed by: PHYSICIAN ASSISTANT

## 2023-03-31 PROCEDURE — 80047 BASIC METABLC PNL IONIZED CA: CPT | Performed by: PHYSICIAN ASSISTANT

## 2023-03-31 PROCEDURE — 99213 OFFICE O/P EST LOW 20 MIN: CPT | Performed by: PHYSICIAN ASSISTANT

## 2023-03-31 PROCEDURE — 85025 COMPLETE CBC W/AUTO DIFF WBC: CPT | Performed by: PHYSICIAN ASSISTANT

## 2023-03-31 RX ORDER — CEPHALEXIN 500 MG/1
500 CAPSULE ORAL 2 TIMES DAILY
Qty: 14 CAPSULE | Refills: 0 | Status: SHIPPED | OUTPATIENT
Start: 2023-03-31 | End: 2023-04-07

## 2023-03-31 NOTE — ED INITIAL ASSESSMENT (HPI)
3 weeks ago patient, was struck in the shin by a crawlspace door. C/o left leg edema and ankle edema. Hematoma noted to left shin. On blood thinners.  Rates pain 1/10

## 2023-05-31 ENCOUNTER — OFFICE VISIT (OUTPATIENT)
Dept: INTERNAL MEDICINE CLINIC | Facility: CLINIC | Age: 88
End: 2023-05-31

## 2023-05-31 VITALS
BODY MASS INDEX: 22.68 KG/M2 | HEART RATE: 75 BPM | WEIGHT: 162 LBS | DIASTOLIC BLOOD PRESSURE: 60 MMHG | SYSTOLIC BLOOD PRESSURE: 118 MMHG | OXYGEN SATURATION: 97 % | HEIGHT: 71 IN

## 2023-05-31 DIAGNOSIS — E78.2 MIXED HYPERLIPIDEMIA: ICD-10-CM

## 2023-05-31 DIAGNOSIS — I10 PRIMARY HYPERTENSION: Primary | ICD-10-CM

## 2023-05-31 DIAGNOSIS — I48.0 PAROXYSMAL ATRIAL FIBRILLATION (HCC): ICD-10-CM

## 2023-05-31 DIAGNOSIS — E03.9 ACQUIRED HYPOTHYROIDISM: ICD-10-CM

## 2023-05-31 PROCEDURE — 99214 OFFICE O/P EST MOD 30 MIN: CPT | Performed by: INTERNAL MEDICINE

## 2023-05-31 PROCEDURE — 1126F AMNT PAIN NOTED NONE PRSNT: CPT | Performed by: INTERNAL MEDICINE

## 2023-06-06 ENCOUNTER — OFFICE VISIT (OUTPATIENT)
Dept: OPHTHALMOLOGY | Facility: CLINIC | Age: 88
End: 2023-06-06

## 2023-06-06 DIAGNOSIS — H43.393 FLOATER, VITREOUS, BILATERAL: ICD-10-CM

## 2023-06-06 DIAGNOSIS — H25.13 AGE-RELATED NUCLEAR CATARACT OF BOTH EYES: Primary | ICD-10-CM

## 2023-06-06 PROCEDURE — 92014 COMPRE OPH EXAM EST PT 1/>: CPT | Performed by: OPHTHALMOLOGY

## 2023-06-06 PROCEDURE — 1126F AMNT PAIN NOTED NONE PRSNT: CPT | Performed by: OPHTHALMOLOGY

## 2023-06-06 NOTE — PATIENT INSTRUCTIONS
Age-related nuclear cataract of both eyes   Discussed diagnosis of cataracts in detail with patient. Cataract surgery not indicated at this time due to vision level. Will continue to monitor yearly. Patient will call sooner than 1 year recall if they notice a change in vision. Continue with same glasses. Will see patient in 1 year for a complete exam    Floater, vitreous, bilateral   There is no evidence of retinal pathology. All signs and symptoms of retinal detachment/tears explained in detail. Patient instructed to call the office if they experience increase in floaters, increase in flashes of light, loss of vision or curtain or veil effect.

## 2023-06-06 NOTE — ASSESSMENT & PLAN NOTE
Discussed diagnosis of cataracts in detail with patient. Cataract surgery not indicated at this time due to vision level. Will continue to monitor yearly. Patient will call sooner than 1 year recall if they notice a change in vision. Continue with same glasses.       Will see patient in 1 year for a complete exam

## 2023-07-19 RX ORDER — PANTOPRAZOLE SODIUM 40 MG/1
40 TABLET, DELAYED RELEASE ORAL
Qty: 90 TABLET | Refills: 3 | Status: SHIPPED | OUTPATIENT
Start: 2023-07-19

## 2023-07-19 NOTE — TELEPHONE ENCOUNTER
Refill passed per [employer] [employer], [facility] protocol.     .  Requested Prescriptions   Pending Prescriptions Disp Refills    PANTOPRAZOLE 40 MG Oral Tab EC [Pharmacy Med Name: PANTOPRAZOLE SOD DR 40 MG TAB] 90 tablet 1     Sig: TAKE 1 TABLET BY MOUTH BEFORE BREAKFAST       Gastrointestional Medication Protocol Passed - 7/18/2023  2:26 PM        Passed - In person appointment or virtual visit in the past 12 mos or appointment in next 3 mos     Recent Outpatient Visits              1 month ago Age-related nuclear cataract of both eyes    Melinda Marx, [address], Ronen Abarca MD    Office Visit    1 month ago Primary hypertension    [facility], [address] Tahira Mcbride MD    Office Visit    7 months ago Paroxysmal atrial fibrillation [facility])    Tahira Stewart MD    Office Visit    11 months ago Mixed hyperlipidemia    Tahira Stewart MD    Office Visit    1 year ago COVID-19    Melinda Marx, [address] Tahira Mcbride MD    Telemedicine          Future Appointments         Provider Department Appt Notes    In 3 weeks MD [facility] Simpson[facility] [address] [facility]/8/2022                    Recent Outpatient Visits              1 month ago Age-related nuclear cataract of both eyes    Melinda Marx, [address], Palma Lucas MD    Office Visit    1 month ago Primary hypertension    Tahira Stewart MD    Office Visit    7 months ago Paroxysmal atrial fibrillation [facility])    Tahira Stewart MD    Office Visit    11 months ago Mixed hyperlipidemia    Tahira Stewart MD    Office Visit    1 year ago [address], [address] [place] Federica Erickson MD    Telemedicine            Future Appointments         Provider Department Appt Notes    In 3 weeks Feliberto Gomez MD [address], [address] [facility] last px 8/8/2022

## 2023-08-02 ENCOUNTER — LAB ENCOUNTER (OUTPATIENT)
Dept: LAB | Facility: HOSPITAL | Age: 88
End: 2023-08-02
Attending: INTERNAL MEDICINE
Payer: MEDICARE

## 2023-08-02 DIAGNOSIS — I10 PRIMARY HYPERTENSION: ICD-10-CM

## 2023-08-02 DIAGNOSIS — E03.9 ACQUIRED HYPOTHYROIDISM: ICD-10-CM

## 2023-08-02 DIAGNOSIS — E78.2 MIXED HYPERLIPIDEMIA: ICD-10-CM

## 2023-08-02 LAB
ALBUMIN SERPL-MCNC: 3.3 G/DL (ref 3.4–5)
ALBUMIN/GLOB SERPL: 0.8 {RATIO} (ref 1–2)
ALP LIVER SERPL-CCNC: 94 U/L
ALT SERPL-CCNC: 31 U/L
ANION GAP SERPL CALC-SCNC: 9 MMOL/L (ref 0–18)
AST SERPL-CCNC: 23 U/L (ref 15–37)
BILIRUB SERPL-MCNC: 0.5 MG/DL (ref 0.1–2)
BUN BLD-MCNC: 29 MG/DL (ref 7–18)
BUN/CREAT SERPL: 19.7 (ref 10–20)
CALCIUM BLD-MCNC: 9.6 MG/DL (ref 8.5–10.1)
CHLORIDE SERPL-SCNC: 106 MMOL/L (ref 98–112)
CHOLEST SERPL-MCNC: 122 MG/DL (ref ?–200)
CO2 SERPL-SCNC: 26 MMOL/L (ref 21–32)
CREAT BLD-MCNC: 1.47 MG/DL
DEPRECATED RDW RBC AUTO: 46.4 FL (ref 35.1–46.3)
EGFRCR SERPLBLD CKD-EPI 2021: 44 ML/MIN/1.73M2 (ref 60–?)
ERYTHROCYTE [DISTWIDTH] IN BLOOD BY AUTOMATED COUNT: 13.3 % (ref 11–15)
FASTING PATIENT LIPID ANSWER: YES
FASTING STATUS PATIENT QL REPORTED: YES
GLOBULIN PLAS-MCNC: 4 G/DL (ref 2.8–4.4)
GLUCOSE BLD-MCNC: 88 MG/DL (ref 70–99)
HCT VFR BLD AUTO: 37.6 %
HDLC SERPL-MCNC: 59 MG/DL (ref 40–59)
HGB BLD-MCNC: 12 G/DL
LDLC SERPL CALC-MCNC: 52 MG/DL (ref ?–100)
MCH RBC QN AUTO: 30.1 PG (ref 26–34)
MCHC RBC AUTO-ENTMCNC: 31.9 G/DL (ref 31–37)
MCV RBC AUTO: 94.2 FL
NONHDLC SERPL-MCNC: 63 MG/DL (ref ?–130)
OSMOLALITY SERPL CALC.SUM OF ELEC: 297 MOSM/KG (ref 275–295)
PLATELET # BLD AUTO: 248 10(3)UL (ref 150–450)
POTASSIUM SERPL-SCNC: 4 MMOL/L (ref 3.5–5.1)
PROT SERPL-MCNC: 7.3 G/DL (ref 6.4–8.2)
RBC # BLD AUTO: 3.99 X10(6)UL
SODIUM SERPL-SCNC: 141 MMOL/L (ref 136–145)
TRIGL SERPL-MCNC: 44 MG/DL (ref 30–149)
TSI SER-ACNC: 1.74 MIU/ML (ref 0.36–3.74)
VLDLC SERPL CALC-MCNC: 6 MG/DL (ref 0–30)
WBC # BLD AUTO: 6.7 X10(3) UL (ref 4–11)

## 2023-08-02 PROCEDURE — 36415 COLL VENOUS BLD VENIPUNCTURE: CPT

## 2023-08-02 PROCEDURE — 85027 COMPLETE CBC AUTOMATED: CPT

## 2023-08-02 PROCEDURE — 80061 LIPID PANEL: CPT

## 2023-08-02 PROCEDURE — 84443 ASSAY THYROID STIM HORMONE: CPT

## 2023-08-02 PROCEDURE — 80053 COMPREHEN METABOLIC PANEL: CPT

## 2023-08-09 ENCOUNTER — OFFICE VISIT (OUTPATIENT)
Dept: INTERNAL MEDICINE CLINIC | Facility: CLINIC | Age: 88
End: 2023-08-09

## 2023-08-09 VITALS
HEART RATE: 72 BPM | BODY MASS INDEX: 22.4 KG/M2 | RESPIRATION RATE: 14 BRPM | OXYGEN SATURATION: 95 % | DIASTOLIC BLOOD PRESSURE: 70 MMHG | HEIGHT: 71 IN | SYSTOLIC BLOOD PRESSURE: 118 MMHG | WEIGHT: 160 LBS

## 2023-08-09 DIAGNOSIS — H25.13 AGE-RELATED NUCLEAR CATARACT OF BOTH EYES: ICD-10-CM

## 2023-08-09 DIAGNOSIS — I48.0 PAROXYSMAL ATRIAL FIBRILLATION (HCC): ICD-10-CM

## 2023-08-09 DIAGNOSIS — Z85.46 HISTORY OF PROSTATE CANCER: ICD-10-CM

## 2023-08-09 DIAGNOSIS — E03.9 ACQUIRED HYPOTHYROIDISM: ICD-10-CM

## 2023-08-09 DIAGNOSIS — H43.393 FLOATER, VITREOUS, BILATERAL: ICD-10-CM

## 2023-08-09 DIAGNOSIS — I62.9 INTRACRANIAL HEMORRHAGE (HCC): ICD-10-CM

## 2023-08-09 DIAGNOSIS — N18.31 STAGE 3A CHRONIC KIDNEY DISEASE (HCC): ICD-10-CM

## 2023-08-09 DIAGNOSIS — I70.0 ATHEROSCLEROSIS OF AORTA (HCC): ICD-10-CM

## 2023-08-09 DIAGNOSIS — J43.8 OTHER EMPHYSEMA (HCC): ICD-10-CM

## 2023-08-09 DIAGNOSIS — I10 PRIMARY HYPERTENSION: ICD-10-CM

## 2023-08-09 DIAGNOSIS — E78.2 MIXED HYPERLIPIDEMIA: Primary | ICD-10-CM

## 2023-08-09 DIAGNOSIS — H91.93 BILATERAL HEARING LOSS, UNSPECIFIED HEARING LOSS TYPE: ICD-10-CM

## 2023-08-09 DIAGNOSIS — M15.9 OSTEOARTHRITIS OF MULTIPLE JOINTS, UNSPECIFIED OSTEOARTHRITIS TYPE: ICD-10-CM

## 2023-08-09 PROBLEM — J45.909 ASTHMATIC BRONCHITIS: Status: RESOLVED | Noted: 2018-01-17 | Resolved: 2023-08-09

## 2023-08-09 PROBLEM — J45.909 ASTHMATIC BRONCHITIS (HCC): Status: RESOLVED | Noted: 2018-01-17 | Resolved: 2023-08-09

## 2023-08-09 PROBLEM — Z86.79 HISTORY OF ORTHOSTATIC HYPOTENSION: Status: RESOLVED | Noted: 2018-12-27 | Resolved: 2023-08-09

## 2023-08-14 PROBLEM — Z85.46 HISTORY OF PROSTATE CANCER: Status: ACTIVE | Noted: 2023-08-14

## 2024-03-05 ENCOUNTER — OFFICE VISIT (OUTPATIENT)
Dept: OTOLARYNGOLOGY | Facility: CLINIC | Age: 89
End: 2024-03-05

## 2024-03-05 DIAGNOSIS — R42 DIZZINESS: Primary | ICD-10-CM

## 2024-03-05 PROCEDURE — 99213 OFFICE O/P EST LOW 20 MIN: CPT | Performed by: STUDENT IN AN ORGANIZED HEALTH CARE EDUCATION/TRAINING PROGRAM

## 2024-03-05 NOTE — PROGRESS NOTES
Vinny Smith Jr. is a 89 year old male.   Chief Complaint   Patient presents with    Vertigo     Dizziness, no nausea or vomiting. X 1 month        ASSESSMENT AND PLAN:   1. Dizziness  89-year-old man presents with dizziness.  He has a very particular story where he was laying on his bed and when he turns his head to the left the vertigo comes on.  This has been going on for about 1 month he has been more careful with his motions.  He swims every morning for about 20 minutes with a snorkel    Normal otologic exam.  No gross nystagmus.  Cranial nerve exam grossly intact    His story is suspicious for BPPV.  Discussed having him undergo the Epley maneuver by the vestibular therapist.  Will reach out to the therapist to see if they can get him in today or later this week.  Will assess his response to the Epley maneuver.  He was agreeable with the plan    - PHYSICAL THERAPY - INTERNAL      The patient indicates understanding of these issues and agrees to the plan.      EXAM:   There were no vitals taken for this visit.    Pertinent exam findings may also be noted above in assessment and plan     System Details   Skin Inspection - Normal.   Constitutional Overall appearance - Normal.   Head/Face Symmetric, TMJ tenderness not present    Eyes EOMI, PERRL   Right ear:  Canal clear, TM intact, no DORITA   Left ear:  Canal clear, TM intact, no DORITA   Nose: Septum midline, inferior turbinates not enlarged, nasal valves without collapse    Oral cavity/Oropharynx: No lesions or masses on inspection or palpation, tonsils symmetric    Neck: Soft without LAD, thyroid not enlarged  Voice clear/ no stridor   Other:      Scopes and Procedures:             Current Outpatient Medications   Medication Sig Dispense Refill    pantoprazole 40 MG Oral Tab EC Take 1 tablet (40 mg total) by mouth before breakfast. 90 tablet 3    atorvastatin 10 MG Oral Tab Take 1 tablet (10 mg total) by mouth daily. 90 tablet 3    LEVOTHYROXINE 100 MCG Oral  Tab TAKE 1 TABLET BY MOUTH BEFORE BREAKFAST. 90 tablet 3    amLODIPine 5 MG Oral Tab Take 1 tablet (5 mg total) by mouth daily.      rivaroxaban 15 MG Oral Tab Take 1 tablet (15 mg total) by mouth daily with food.      Multiple Vitamins-Minerals (CENTRUM SILVER) Oral Tab Take 1 tablet by mouth daily.      DilTIAZem HCl ER Coated Beads 120 MG Oral Capsule SR 24 Hr Take 1 capsule (120 mg total) by mouth daily. 30 capsule 1      Past Medical History:   Diagnosis Date    Age-related nuclear cataract of both eyes 2021    Contusion of left hip and thigh, initial encounter 2022    Cough 2020    Epistaxis 3/24/2021    Floater, vitreous, bilateral 2021    Global amnesia 2010    per [employer] in [place]    Headache 2012    Hypothyroid 2010    Inguinal hernia     Nocturia 2008    Palpitations 2001    toprol    Perforated ear drum     Physical exam, annual 3/24/2021    Prostate cancer (HCC) 2007    seed implant    Pulse irregularity 2015      Social History:  Social History     Socioeconomic History    Marital status:    Tobacco Use    Smoking status: Never    Smokeless tobacco: Never   Vaping Use    Vaping Use: Never used   Substance and Sexual Activity    Alcohol use: No    Drug use: No   Other Topics Concern    Caffeine Concern No          Hilario Vargas MD  3/5/2024  10:00 AM

## 2024-03-06 ENCOUNTER — TELEPHONE (OUTPATIENT)
Dept: PHYSICAL THERAPY | Facility: HOSPITAL | Age: 89
End: 2024-03-06

## 2024-03-07 ENCOUNTER — OFFICE VISIT (OUTPATIENT)
Dept: PHYSICAL THERAPY | Facility: HOSPITAL | Age: 89
End: 2024-03-07
Attending: STUDENT IN AN ORGANIZED HEALTH CARE EDUCATION/TRAINING PROGRAM
Payer: MEDICARE

## 2024-03-07 DIAGNOSIS — R42 DIZZINESS: Primary | ICD-10-CM

## 2024-03-07 PROCEDURE — 95992 CANALITH REPOSITIONING PROC: CPT

## 2024-03-07 PROCEDURE — 97162 PT EVAL MOD COMPLEX 30 MIN: CPT

## 2024-03-07 NOTE — PROGRESS NOTES
VESTIBULAR EVALUATION:      Diagnosis:   L horizontal BPPV       Dysequilibrium    Referring Provider: Sam  Date of Evaluation:    3/7/2024    Precautions:  Hearing impairment Next MD visit:   none scheduled  Date of Surgery: n/a      PATIENT SUMMARY   Vinny Smith Jr. is a 89 year old male who presents to therapy today with reports of spinning sensation with head turns that started about one month ago.  History/onset of current condition: Patient reports that he started getting sensations \"like the room is spinning\" that have occurred for one month. Pt stated that he tried to test BPPV one week ago, felt 4/10 spinning sensation with L head rotation, and has not had any dizzy spells since. Pt was instructed not to test himself or try to treat his symptoms and to let a healthcare professional test/treat him. Pt has a R rotator cuff injury, currently in PT for treatment. Sleeps in lazy boy d/t shoulder pain.  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  Social history:  Pt swims for 20 minutes a day, wears a snorkle which maintains his neck position.  Home Set Up: Lives independently, no stairs. Has son who lives nearby to check on him daily.   Vinny describes prior level of function independent with all ADLs.  Pt goals include resolution of his symptoms  Past medical history was reviewed with Vinny. Significant findings include        Past Medical History:   Diagnosis Date    Age-related nuclear cataract of both eyes 5/26/2021    Contusion of left hip and thigh, initial encounter 2/17/2022    Cough  2020    Epistaxis 3/24/2021    Floater, vitreous, bilateral 2021    Global amnesia 2010     per N minutes in [place]    Headache 2012    Hypothyroid 2010    Inguinal hernia      Nocturia 2008    Palpitations      toprol    Perforated ear drum      Physical exam, annual 3/24/2021    Prostate cancer (HCC) 2007     seed implant    Pulse irregularity 2015            ASSESSMENT  Vinny presents to physical therapy evaluation with primary c/o dizziness, spinning sensations. The results of the objective tests and measures show L horizontal BPPV.  Functional deficits include but are not limited to head turns and turning in bed. Signs and symptoms are consistent with the referring diagnosis. Pt and PT discussed evaluation findings, pathology, POC and HEP.  Pt voiced understanding and performs HEP correctly. Pt able to ambulate out of session independently reporting no notable imbalance; verbalized understanding of cautious transitions and movements over next few days after maneuver. Skilled Physical Therapy is medically necessary to address the above impairments and reach functional goals.     OBJECTIVE:   Physical Exam:  Posture/Observation: Forward head posture and forward shoulders in sitting and standing. Forward flexed at the trunk with ambulation.  Neuro Screen: Sensation: NT      Cervical spine ROM: Flex WNL, Ext 75%, R SB 25%, L SB 25% , R ROT 75%, L ROT 75%   Adverse neuro signs with ROM: yes no: no      Occulomotor & Vestibulo-Ocular Exam:  Spontaneous Nystagmus: room light: neg ;  fixation blocked: neg  Smooth Pursuit: Negative  Saccades: Negative  Gaze Evoked Nystagmus:  room light: Negative; fixation blocked: Negative  Head Thrust: Negative  VOR screen:  WNL    VOR Cancellation: Negative   Convergence: Negative  Cover/Uncover:  Negative  Cross Cover:  Negative  Head Shaking Nystagmus: Not Tested  Dynamic Visual Acuity:  Not Tested     Positional Testing:   Edita-Hallpike: R neg, no  sxs; L neg, no sxs   Roll Test (HC): + L   L horizontal geotropic nystagmus.   Latency: 5 sec  Duration: 5 sec  Symptoms: spinning sensation       Postural Control:   Romberg: EO 30 sec, EC 30 sec   Romberg on Foam: EO NT sec, EC NT sec         Functional Mobility:   Gait: pt ambulates on level ground with antalgia and stooped posture/forward lean.   Horizontal and vertical head turns: TBA     Today's Treatment and Response:   Pt education was provided on exam findings, treatment diagnosis, treatment plan, expectations, and prognosis. Pt was also provided recommendations for possible dizziness after evaluation.   Patient was instructed in and issued a HEP for: No HEP at this time  Education provided on BPPV and treatment of canalith repositioning maneuver. Pt instructed not to perform maneuvers by himself, and to have a trained professional perform them.     Charges: PT Eval Moderate Complexity, CRMx1      Total Timed Treatment: 15 min     Total Treatment Time: 45 min      Based on clinical rationale and outcome measures, this evaluation involved Moderate Complexity decision making due to 1-2 personal factors/comorbidities, 3 body structures involved/activity limitations.  PLAN OF CARE:    Goals: (to be met in 8 visits)  ·           Negative [place] Hallpike and Roll Testing  ·           Able to perform position changes such as supine to/from sit, rolling, and bending over to the floor without dizziness to improve safety in functional tasks.   ·           Able to ambulate with horizontal and vertical head turns for visual scanning without path deviation or dizziness to improve safety during gait.      Frequency / Duration: Patient will be seen for 1 x/week or a total of 8 visits over a 90 day period. Treatment will include: home exercise program development and instruction, balance training, neuromuscular re-education, therapeutic exercise, therapeutic activity and canalith repositioning maneuver.      Education or  treatment limitation: None   Rehab Potential: good      Patient/Family/Caregiver was advised of these findings, precautions, and treatment options and has agreed to actively participate in planning and for this course of care.      Thank you for your referral. Please co-sign or sign and return this letter via fax as soon as possible to [phone]. If you have any questions, please contact me at Dept: [phone]     Sincerely,  Electronically signed by therapist: Cristiana Hopper, PT, DPT  Physician's certification required: Yes  I certify the need for these services furnished under this plan of treatment and while under my care.     X___________________________________________________ Date____________________     Certification From: 3/7/2024  To:6/5/2024

## 2024-03-11 ENCOUNTER — TELEPHONE (OUTPATIENT)
Dept: PHYSICAL THERAPY | Facility: HOSPITAL | Age: 89
End: 2024-03-11

## 2024-03-11 ENCOUNTER — APPOINTMENT (OUTPATIENT)
Dept: PHYSICAL THERAPY | Facility: HOSPITAL | Age: 89
End: 2024-03-11
Attending: STUDENT IN AN ORGANIZED HEALTH CARE EDUCATION/TRAINING PROGRAM
Payer: MEDICARE

## 2024-03-12 ENCOUNTER — TELEPHONE (OUTPATIENT)
Dept: PHYSICAL THERAPY | Facility: HOSPITAL | Age: 89
End: 2024-03-12

## 2024-03-12 ENCOUNTER — OFFICE VISIT (OUTPATIENT)
Dept: PHYSICAL THERAPY | Facility: HOSPITAL | Age: 89
End: 2024-03-12
Attending: STUDENT IN AN ORGANIZED HEALTH CARE EDUCATION/TRAINING PROGRAM
Payer: MEDICARE

## 2024-03-12 PROCEDURE — 95992 CANALITH REPOSITIONING PROC: CPT

## 2024-03-12 NOTE — PROGRESS NOTES
Date  3/12/24                   Visit Number  2/8                   NMR                     Ther EX                     Ther Act                     Gait Training                     CRM  x                   Manual                        Dx: L BPPV, Dysequilibrium         Insurance:  Medicare    Visit # authorized:  8  Referring MD: Dr. Vargas   Precautions: Hearing Impairment  Medication Changes since last visit?: No  Subjective: Pt reports feeling the same as last session. The only times he has spinning sensation/dizziness is when he is doing curl ups -- when he is done performing the exercise he feels the room spinning for about one minute, and the feeling goes away. Does not have any issue with rolling in bed or turning his head.      Objective:   Gaze evoked nystagmus without fixation: neg B  Gaze evoked nystagmus with fixation: neg B   Roll Test: - on R, + on L: Left beating nystagmus, 10 sec latency, 5 sec duration, sxs of spinning/dizziness  CRM (BBQ Roll) x2 for L horizontal canal      Patient Education: Education provided on not limiting head movements as a means to resolve BPPV, as there are no restrictions to head movements. Education also provided on the possibility of feeling off balance after doing CRM today.     Assessment: Performed CRM for L horizontal canal to which patient tolerated well. When performing second repositioning maneuver, pt demonstrated a L torisional, up-beating nystagmus and sxs of dizziness while in L roll rotation in supine, and nose down in R side lying, indicating the possibility of L posterior canal BPPV-- will assess next session and treat as needed. Multiple Canal Diagnosis may take additional treatment sessions due to complexity of maneuvers.        Plan for next few sessions: [place] Hallpike and roll testing, treat as needed.     Charges: 1 CRM               Total Timed Treatment: 35 min              Total Treatment Time: 35 min

## 2024-03-15 ENCOUNTER — OFFICE VISIT (OUTPATIENT)
Dept: PHYSICAL THERAPY | Facility: HOSPITAL | Age: 89
End: 2024-03-15
Attending: STUDENT IN AN ORGANIZED HEALTH CARE EDUCATION/TRAINING PROGRAM
Payer: MEDICARE

## 2024-03-15 PROCEDURE — 95992 CANALITH REPOSITIONING PROC: CPT

## 2024-03-20 ENCOUNTER — TELEPHONE (OUTPATIENT)
Dept: PHYSICAL THERAPY | Facility: HOSPITAL | Age: 89
End: 2024-03-20

## 2024-03-21 ENCOUNTER — OFFICE VISIT (OUTPATIENT)
Dept: PHYSICAL THERAPY | Facility: HOSPITAL | Age: 89
End: 2024-03-21
Attending: STUDENT IN AN ORGANIZED HEALTH CARE EDUCATION/TRAINING PROGRAM
Payer: MEDICARE

## 2024-03-21 PROCEDURE — 97112 NEUROMUSCULAR REEDUCATION: CPT

## 2024-03-21 NOTE — PROGRESS NOTES
Shira  Pt has attended 4 visits in Physical Therapy.      Date  3/12/24  3/15/24  3/21/24               Visit Number  2/8  3/8  4/8               NMR      x               Ther EX                     Ther Act                     Gait Training                     CRM  x  x                 Manual                        Dx: L BPPV, Dysequilibrium         Insurance:  Medicare    Visit # authorized:  8  Referring MD: Dr. Vargas   Precautions: Hearing Impairment  Medication Changes since last visit?: No  Subjective: Pt reports feeling great, has not had any symptoms of spinning or dizziness since last session.      Objective:     NMR: 35'    Gaze evoked nystagmus without fixation: neg Bilaterally  Gaze evoked nystagmus with fixation: neg Bilaterally  Edita Hallpike Test: neg bilaterally  Roll Test: neg bilaterally     4-stage balance:  Romberg EO: 30 sec  Romberg EC: 30 sec  Tandem: <5sec, mod sway ea  Staggered stance EO: 30 sec, min sway ea  Staggered stance EC: 30 sec, mod sway ea     Gait with vertical head turns: decreased speed, no path deviation  Gait with horizontal head turns: decreased speed, no path deviation        Patient Education: Education provided on not limiting head movements as a means to resolve BPPV, as there are no restrictions to head movements. Education also provided on the recurrence rate of BPPV, and if pt has symptoms again- get an order from PCP and come back for treatment.     Assessment: Pt tested negative with all positional testing without symptoms, indicating that BPPV symptoms have resolved. Pt able to perform gait with horizontal and vertical head turns without increased path deviation. Static balance in tandem poses greatest challenge to pt's balance, otherwise other positional static stance was tolerated well by patient. Given objective testing and conversation with patient regarding plan of care, pt is discharged and instructed to return if he has new onset of symptoms.      PT Goals:  Negative Edita Hallpike and Roll Testing (met)  Able to perform position changes such as supine to/from sit, rolling, and bending over to the floor without dizziness to improve safety in functional tasks. (met)  Able to ambulate with horizontal and vertical head turns for visual scanning without path deviation or dizziness to improve safety during gait. (met)     Plan for next few sessions: Discharge from therapy, as pt has achieved goals.     Charges: 2 x NMR    Total Timed Treatment: 35 min              Total Treatment Time: 35 min        Plan: Pt is discharged from skilled physical therapy, as he has achieved his goals.     Thank you for your referral. If you have any questions, please contact me at Dept: [phone].     Sincerely,  Electronically signed by therapist: Cristiana Hopper, PT, DPT     Physician's certification required:  No  Please co-sign or sign and return this letter via fax as soon as possible to [phone].   I certify the need for these services furnished under this plan of treatment and while under my care.     X___________________________________________________ Date____________________     Certification From: 3/21/2024  To:6/19/2024

## 2024-04-05 ENCOUNTER — APPOINTMENT (OUTPATIENT)
Dept: PHYSICAL THERAPY | Facility: HOSPITAL | Age: 89
End: 2024-04-05
Attending: STUDENT IN AN ORGANIZED HEALTH CARE EDUCATION/TRAINING PROGRAM
Payer: MEDICARE

## 2024-04-10 ENCOUNTER — OFFICE VISIT (OUTPATIENT)
Dept: INTERNAL MEDICINE CLINIC | Facility: CLINIC | Age: 89
End: 2024-04-10

## 2024-04-10 VITALS
HEIGHT: 71 IN | DIASTOLIC BLOOD PRESSURE: 72 MMHG | SYSTOLIC BLOOD PRESSURE: 134 MMHG | BODY MASS INDEX: 23.94 KG/M2 | WEIGHT: 171 LBS | HEART RATE: 77 BPM

## 2024-04-10 DIAGNOSIS — I10 PRIMARY HYPERTENSION: ICD-10-CM

## 2024-04-10 DIAGNOSIS — N18.31 STAGE 3A CHRONIC KIDNEY DISEASE (HCC): ICD-10-CM

## 2024-04-10 DIAGNOSIS — I48.0 PAROXYSMAL ATRIAL FIBRILLATION (HCC): Primary | ICD-10-CM

## 2024-04-10 DIAGNOSIS — J43.8 OTHER EMPHYSEMA (HCC): ICD-10-CM

## 2024-04-10 DIAGNOSIS — I70.0 ATHEROSCLEROSIS OF AORTA (HCC): ICD-10-CM

## 2024-04-10 DIAGNOSIS — E03.9 ACQUIRED HYPOTHYROIDISM: ICD-10-CM

## 2024-04-10 DIAGNOSIS — E78.2 MIXED HYPERLIPIDEMIA: ICD-10-CM

## 2024-04-10 PROCEDURE — 99214 OFFICE O/P EST MOD 30 MIN: CPT | Performed by: INTERNAL MEDICINE

## 2024-04-10 RX ORDER — LEVOTHYROXINE SODIUM 0.1 MG/1
100 TABLET ORAL
Qty: 90 TABLET | Refills: 3 | Status: SHIPPED | OUTPATIENT
Start: 2024-04-10

## 2024-04-10 RX ORDER — ATORVASTATIN CALCIUM 10 MG/1
10 TABLET, FILM COATED ORAL DAILY
Qty: 90 TABLET | Refills: 3 | Status: SHIPPED | OUTPATIENT
Start: 2024-04-10

## 2024-04-10 RX ORDER — LEVOTHYROXINE SODIUM 0.1 MG/1
100 TABLET ORAL
Qty: 90 TABLET | Refills: 3 | OUTPATIENT
Start: 2024-04-10

## 2024-04-10 NOTE — PROGRESS NOTES
Vinny Smith Jr. is a 89 year old male.  Chief Complaint   Patient presents with    Hyperlipidemia     F/u    HTN     F/u      HPI:   89-year-old gentleman here for follow-up.  He report that he is doing well.  Continue to be physically active.  Swims in courts plus +5 times a week.  Denies any chest pain or shortness of breath.  Occasionally feels palpitations.  Follows up with cardiology.  No syncopal events.  No bleeding reported.      Current Outpatient Medications   Medication Sig Dispense Refill    atorvastatin 10 MG Oral Tab Take 1 tablet (10 mg total) by mouth daily. 90 tablet 3    levothyroxine 100 MCG Oral Tab Take 1 tablet (100 mcg total) by mouth before breakfast. 90 tablet 3    pantoprazole 40 MG Oral Tab EC Take 1 tablet (40 mg total) by mouth before breakfast. 90 tablet 3    amLODIPine 5 MG Oral Tab Take 1 tablet (5 mg total) by mouth daily.      rivaroxaban 15 MG Oral Tab Take 1 tablet (15 mg total) by mouth daily with food.      Multiple Vitamins-Minerals (CENTRUM SILVER) Oral Tab Take 1 tablet by mouth daily.      DilTIAZem HCl ER Coated Beads 120 MG Oral Capsule SR 24 Hr Take 1 capsule (120 mg total) by mouth daily. 30 capsule 1      Past Medical History:    Age-related nuclear cataract of both eyes    Contusion of left hip and thigh, initial encounter    Cough    Epistaxis    Floater, vitreous, bilateral    Global amnesia    per N minutes in [place]    Headache    Hypothyroid    Inguinal hernia    Nocturia    Palpitations    toprol    Perforated ear drum    Physical exam, annual    Prostate cancer (HCC)    seed implant    Pulse irregularity      Past Surgical History:   Procedure Laterality Date    Colonoscopy      Electrocardiogram, complete  2012    scanned to media tab    Inguinal hernia repair      Inner ear surgery proc unlisted      mastoidectomy      Social History:  Social History     Socioeconomic History    Marital status:    Tobacco Use    Smoking status: Never     Smokeless tobacco: Never   Vaping Use    Vaping status: Never Used   Substance and Sexual Activity    Alcohol use: No    Drug use: No   Other Topics Concern    Caffeine Concern No      Family History   Problem Relation Age of Onset    Gastro-Intestinal Disorder Father         diverticulitis    Heart Disease Mother         coronary artery disease (cause of death)    Arthritis Sister     Diabetes Maternal Grandmother     Glaucoma Neg     Macular degeneration Neg       Allergies   Allergen Reactions    Paxil [Paroxetine] RASH    Sulfacetamide      Other reaction(s): SULFA (SULFONAMIDE ANTIBIOTICS)        REVIEW OF SYSTEMS:   Review of Systems   Review of Systems   Constitutional: Negative for activity change, appetite change and fever.   HENT: Negative for congestion and voice change.    Respiratory: Negative for cough and shortness of breath.    Cardiovascular: Negative for chest pain.   Gastrointestinal: Negative for abdominal distention, abdominal pain and vomiting.   Genitourinary: Negative for hematuria.   Skin: Negative for wound.   Psychiatric/Behavioral: Negative for behavioral problems.   Wt Readings from Last 5 Encounters:   04/10/24 171 lb (77.6 kg)   08/09/23 160 lb (72.6 kg)   05/31/23 162 lb (73.5 kg)   11/30/22 166 lb (75.3 kg)   09/01/22 160 lb (72.6 kg)     Body mass index is 23.85 kg/m².      EXAM:   /72 (BP Location: Right arm, Patient Position: Sitting, Cuff Size: adult)   Pulse 77   Ht 5' 11\" (1.803 m)   Wt 171 lb (77.6 kg)   BMI 23.85 kg/m²   Physical Exam   Constitutional:       Appearance: Normal appearance.   HENT:      Head: Normocephalic.   Eyes:      Conjunctiva/sclera: Conjunctivae normal.   Cardiovascular:      Rate and Rhythm: Normal rate and regular rhythm.      Heart sounds: Normal heart sounds. No murmur heard.  Pulmonary:      Effort: Pulmonary effort is normal.      Breath sounds: Normal breath sounds. No rhonchi or rales.   Abdominal:      General: Bowel sounds are  normal.      Palpations: Abdomen is soft.      Tenderness: There is no abdominal tenderness.   Musculoskeletal:      Cervical back: Neck supple.      Right lower leg: No edema.      Left lower leg: No edema.   Skin:     General: Skin is warm and dry.   Neurological:      General: No focal deficit present.      Mental Status: He is alert and oriented to person, place, and time. Mental status is at baseline.   Psychiatric:         Mood and Affect: Mood normal.         Behavior: Behavior normal.       ASSESSMENT AND PLAN:   1. Paroxysmal atrial fibrillation (HCC)  Appears to be in sinus today.  Continue with Cardizem and amlodipine.  (Approved by cardiology).  Continue Xarelto.  2. Mixed hyperlipidemia  Continue statins.  Will monitor lipid profile and LFT.    3. Primary hypertension  Blood pressure is optimally controlled.  Continue with the current medical regimen.  - CBC, Platelet; No Differential; Future  - Comp Metabolic Panel (14); Future  - Lipid Panel; Future  - TSH W Reflex To Free T4; Future    4. Acquired hypothyroidism  Continue supplementation.  Check TSH before next visit.  - TSH W Reflex To Free T4; Future    5. Other emphysema (HCC)  He does not use any inhalers.  Chest x-ray report reviewed    6. Atherosclerosis of aorta (HCC)  Continue statins    7. Stage 3a chronic kidney disease (HCC)  Monitor GFR.  Blood pressure is optimally controlled.  Will repeat before next visit.    Plan: Medication prescribed Labs ordered I will see him back in 4 months for his Medicare physical.      The patient indicates understanding of these issues and agrees to the plan.  No follow-ups on file.    This note was prepared using Dragon Medical voice recognition dictation software. As a result errors may occur. When identified these errors have been corrected. While every attempt is made to correct errors during dictation discrepancies may still exist.

## 2024-08-01 ENCOUNTER — LAB ENCOUNTER (OUTPATIENT)
Dept: LAB | Facility: HOSPITAL | Age: 89
End: 2024-08-01
Attending: INTERNAL MEDICINE
Payer: MEDICARE

## 2024-08-01 DIAGNOSIS — E03.9 ACQUIRED HYPOTHYROIDISM: ICD-10-CM

## 2024-08-01 DIAGNOSIS — I10 PRIMARY HYPERTENSION: ICD-10-CM

## 2024-08-01 LAB
ALBUMIN SERPL-MCNC: 4.1 G/DL (ref 3.2–4.8)
ALBUMIN/GLOB SERPL: 1.4 {RATIO} (ref 1–2)
ALP LIVER SERPL-CCNC: 88 U/L
ALT SERPL-CCNC: 18 U/L
ANION GAP SERPL CALC-SCNC: 5 MMOL/L (ref 0–18)
AST SERPL-CCNC: 26 U/L (ref ?–34)
BILIRUB SERPL-MCNC: 0.8 MG/DL (ref 0.2–0.9)
BUN BLD-MCNC: 22 MG/DL (ref 9–23)
BUN/CREAT SERPL: 16.2 (ref 10–20)
CALCIUM BLD-MCNC: 9.9 MG/DL (ref 8.7–10.4)
CHLORIDE SERPL-SCNC: 107 MMOL/L (ref 98–112)
CHOLEST SERPL-MCNC: 143 MG/DL (ref ?–200)
CO2 SERPL-SCNC: 28 MMOL/L (ref 21–32)
CREAT BLD-MCNC: 1.36 MG/DL
DEPRECATED RDW RBC AUTO: 43.8 FL (ref 35.1–46.3)
EGFRCR SERPLBLD CKD-EPI 2021: 48 ML/MIN/1.73M2 (ref 60–?)
ERYTHROCYTE [DISTWIDTH] IN BLOOD BY AUTOMATED COUNT: 13.2 % (ref 11–15)
FASTING PATIENT LIPID ANSWER: YES
FASTING STATUS PATIENT QL REPORTED: YES
GLOBULIN PLAS-MCNC: 3 G/DL (ref 2–3.5)
GLUCOSE BLD-MCNC: 92 MG/DL (ref 70–99)
HCT VFR BLD AUTO: 36.9 %
HDLC SERPL-MCNC: 56 MG/DL (ref 40–59)
HGB BLD-MCNC: 12.1 G/DL
LDLC SERPL CALC-MCNC: 76 MG/DL (ref ?–100)
MCH RBC QN AUTO: 30 PG (ref 26–34)
MCHC RBC AUTO-ENTMCNC: 32.8 G/DL (ref 31–37)
MCV RBC AUTO: 91.6 FL
NONHDLC SERPL-MCNC: 87 MG/DL (ref ?–130)
OSMOLALITY SERPL CALC.SUM OF ELEC: 293 MOSM/KG (ref 275–295)
PLATELET # BLD AUTO: 299 10(3)UL (ref 150–450)
POTASSIUM SERPL-SCNC: 3.8 MMOL/L (ref 3.5–5.1)
PROT SERPL-MCNC: 7.1 G/DL (ref 5.7–8.2)
RBC # BLD AUTO: 4.03 X10(6)UL
SODIUM SERPL-SCNC: 140 MMOL/L (ref 136–145)
TRIGL SERPL-MCNC: 50 MG/DL (ref 30–149)
TSI SER-ACNC: 3.48 MIU/ML (ref 0.55–4.78)
VLDLC SERPL CALC-MCNC: 8 MG/DL (ref 0–30)
WBC # BLD AUTO: 6.7 X10(3) UL (ref 4–11)

## 2024-08-01 PROCEDURE — 85027 COMPLETE CBC AUTOMATED: CPT

## 2024-08-01 PROCEDURE — 80061 LIPID PANEL: CPT

## 2024-08-01 PROCEDURE — 84443 ASSAY THYROID STIM HORMONE: CPT

## 2024-08-01 PROCEDURE — 36415 COLL VENOUS BLD VENIPUNCTURE: CPT

## 2024-08-01 PROCEDURE — 80053 COMPREHEN METABOLIC PANEL: CPT

## 2024-08-07 ENCOUNTER — OFFICE VISIT (OUTPATIENT)
Dept: INTERNAL MEDICINE CLINIC | Facility: CLINIC | Age: 89
End: 2024-08-07

## 2024-08-07 VITALS
HEIGHT: 71 IN | BODY MASS INDEX: 22.68 KG/M2 | OXYGEN SATURATION: 98 % | HEART RATE: 79 BPM | DIASTOLIC BLOOD PRESSURE: 66 MMHG | WEIGHT: 162 LBS | SYSTOLIC BLOOD PRESSURE: 122 MMHG

## 2024-08-07 DIAGNOSIS — I10 PRIMARY HYPERTENSION: Primary | ICD-10-CM

## 2024-08-07 DIAGNOSIS — E78.2 MIXED HYPERLIPIDEMIA: ICD-10-CM

## 2024-08-07 DIAGNOSIS — E03.9 ACQUIRED HYPOTHYROIDISM: ICD-10-CM

## 2024-08-07 DIAGNOSIS — I48.0 PAROXYSMAL ATRIAL FIBRILLATION (HCC): ICD-10-CM

## 2024-08-07 PROCEDURE — G2211 COMPLEX E/M VISIT ADD ON: HCPCS | Performed by: INTERNAL MEDICINE

## 2024-08-07 PROCEDURE — 99214 OFFICE O/P EST MOD 30 MIN: CPT | Performed by: INTERNAL MEDICINE

## 2024-08-07 NOTE — PROGRESS NOTES
Vinny Smith Jr. is a 89 year old male.  Chief Complaint   Patient presents with    Follow - Up     Following up on recent labs, concerned about GFR level     HPI:   89-year-old gentleman here for follow-up.  He had concerns regarding his GFR.  I have reviewed his GFR for the last 10 years.  It has been stable.  He denies any chest pain or shortness of breath.  He swims 6 times a week.  Discussed regarding fall prevention.      Current Outpatient Medications   Medication Sig Dispense Refill    atorvastatin 10 MG Oral Tab Take 1 tablet (10 mg total) by mouth daily. 90 tablet 3    levothyroxine 100 MCG Oral Tab Take 1 tablet (100 mcg total) by mouth before breakfast. 90 tablet 3    pantoprazole 40 MG Oral Tab EC Take 1 tablet (40 mg total) by mouth before breakfast. 90 tablet 3    amLODIPine 5 MG Oral Tab Take 1 tablet (5 mg total) by mouth daily.      rivaroxaban 15 MG Oral Tab Take 1 tablet (15 mg total) by mouth daily with food.      Multiple Vitamins-Minerals (CENTRUM SILVER) Oral Tab Take 1 tablet by mouth daily.      DilTIAZem HCl ER Coated Beads 120 MG Oral Capsule SR 24 Hr Take 1 capsule (120 mg total) by mouth daily. 30 capsule 1      Past Medical History:    Age-related nuclear cataract of both eyes    Contusion of left hip and thigh, initial encounter    Cough    Epistaxis    Floater, vitreous, bilateral    Global amnesia    per N minutes in [place]    Headache    Hypothyroid    Inguinal hernia    Nocturia    Palpitations    toprol    Perforated ear drum    Physical exam, annual    Prostate cancer (HCC)    seed implant    Pulse irregularity      Past Surgical History:   Procedure Laterality Date    Colonoscopy      Electrocardiogram, complete  2012    scanned to media tab    Inguinal hernia repair      Inner ear surgery proc unlisted      mastoidectomy      Social History:  Social History     Socioeconomic History    Marital status:    Tobacco Use    Smoking status: Never    Smokeless  tobacco: Never   Vaping Use    Vaping status: Never Used   Substance and Sexual Activity    Alcohol use: No    Drug use: No   Other Topics Concern    Caffeine Concern No      Family History   Problem Relation Age of Onset    Gastro-Intestinal Disorder Father         diverticulitis    Heart Disease Mother         coronary artery disease (cause of death)    Arthritis Sister     Diabetes Maternal Grandmother     Glaucoma Neg     Macular degeneration Neg       Allergies   Allergen Reactions    Paxil [Paroxetine] RASH    Sulfacetamide      Other reaction(s): SULFA (SULFONAMIDE ANTIBIOTICS)        REVIEW OF SYSTEMS:   Review of Systems   Review of Systems   Constitutional: Negative for activity change, appetite change and fever.   HENT: Negative for congestion and voice change.    Respiratory: Negative for cough and shortness of breath.    Cardiovascular: Negative for chest pain.   Gastrointestinal: Negative for abdominal distention, abdominal pain and vomiting.   Genitourinary: Negative for hematuria.   Skin: Negative for wound.   Psychiatric/Behavioral: Negative for behavioral problems.   Wt Readings from Last 5 Encounters:   08/07/24 162 lb (73.5 kg)   04/10/24 171 lb (77.6 kg)   08/09/23 160 lb (72.6 kg)   05/31/23 162 lb (73.5 kg)   11/30/22 166 lb (75.3 kg)     Body mass index is 22.59 kg/m².      EXAM:   /66   Pulse 79   Ht 5' 11\" (1.803 m)   Wt 162 lb (73.5 kg)   SpO2 98%   BMI 22.59 kg/m²   Physical Exam   Constitutional:       Appearance: Normal appearance.   HENT:      Head: Normocephalic.   Eyes:      Conjunctiva/sclera: Conjunctivae normal.   Cardiovascular:      Rate and Rhythm: Normal rate and regular rhythm.      Heart sounds: Normal heart sounds. No murmur heard.  Pulmonary:      Effort: Pulmonary effort is normal.      Breath sounds: Normal breath sounds. No rhonchi or rales.   Abdominal:      General: Bowel sounds are normal.      Palpations: Abdomen is soft.      Tenderness: There is no  abdominal tenderness.   Musculoskeletal:      Cervical back: Neck supple.      Right lower leg: No edema.      Left lower leg: No edema.   Skin:     General: Skin is warm and dry.   Neurological:      General: No focal deficit present.      Mental Status: He is alert and oriented to person, place, and time. Mental status is at baseline.   Psychiatric:         Mood and Affect: Mood normal.         Behavior: Behavior normal.       ASSESSMENT AND PLAN:   1. Primary hypertension  Lab Results   Component Value Date    CREATSERUM 1.36 (H) 08/01/2024    TSH 3.481 08/01/2024     Will continue to monitor blood pressure.  Encouraged patient to avoid salt.  Continue current medical regimen.  I have reassured him regarding GFR.  He is on amlodipine and diltiazem as approved by cardiology.    2. Mixed hyperlipidemia  Lab Results   Component Value Date    LDL 76 08/01/2024    AST 26 08/01/2024    ALT 18 08/01/2024      Encouraged patient to eat healthy.  Avoid fat fried foods and increase activity as tolerated.  We will monitor lipid profile and liver function test.        3. Acquired hypothyroidism  Continue thyroid supplements.  Monitor TSH    4. Paroxysmal atrial fibrillation (HCC)  Continues to be in sinus.  Follows with cardiology.  Continue Xarelto.  Fall prevention discussed.    Plan: As above.  See him back in 6 months.  Meanwhile, if there is any concerns, he will contact me.      The patient indicates understanding of these issues and agrees to the plan.  No follow-ups on file.    This note was prepared using Dragon Medical voice recognition dictation software. As a result errors may occur. When identified these errors have been corrected. While every attempt is made to correct errors during dictation discrepancies may still exist.

## 2024-10-22 ENCOUNTER — OFFICE VISIT (OUTPATIENT)
Dept: OTOLARYNGOLOGY | Facility: CLINIC | Age: 89
End: 2024-10-22
Payer: MEDICARE

## 2024-10-22 VITALS — BODY MASS INDEX: 22.68 KG/M2 | HEIGHT: 71 IN | WEIGHT: 162 LBS

## 2024-10-22 DIAGNOSIS — R04.0 NOSEBLEED: Primary | ICD-10-CM

## 2024-10-22 PROCEDURE — 99213 OFFICE O/P EST LOW 20 MIN: CPT | Performed by: OTOLARYNGOLOGY

## 2024-10-22 PROCEDURE — 30901 CONTROL OF NOSEBLEED: CPT | Performed by: OTOLARYNGOLOGY

## 2024-11-26 ENCOUNTER — OFFICE VISIT (OUTPATIENT)
Dept: OTOLARYNGOLOGY | Facility: CLINIC | Age: 89
End: 2024-11-26

## 2024-11-26 DIAGNOSIS — R04.0 NOSEBLEED: Primary | ICD-10-CM

## 2024-11-26 PROCEDURE — 99213 OFFICE O/P EST LOW 20 MIN: CPT | Performed by: OTOLARYNGOLOGY

## 2024-11-26 NOTE — PROGRESS NOTES
Vinny Smith Jr. is a 89 year old male.    Chief Complaint   Patient presents with    Follow - Up     Patient is here due to nosebleed follow up        HISTORY OF PRESENT ILLNESS  He presents with a history of nosebleeds for the last year or so.  Given a referral to see me sometime ago but never came and only noted significant increase in frequency and the length of time bleeding in the past few months.  He is on Xarelto and has not stopped it recently.  Last nosebleed was yesterday.  No other signs, symptoms or complaints     12/17/21 doing very well.  Had one tiny nosebleed within a few days of being cauterized with since then he has had no further bleeding from the nose at all.  Very happy with the results of his cauterization.  Using Neosporin or Vaseline at least 3 times a day recently and this seems to be helping with dryness intranasally.  No other signs, symptoms or complaints.  Using Xarelto daily.     10/22/24 I last saw him about 3 years ago and at that time I cauterized hypertrophic vessel with silver nitrate.  He has done quite well up until a few weeks ago when he started having some bleeding once again.  Only on the right side actually occurred last evening at around 12 at night.  Brisk he puts pressure reports piece of Kleenex in his nose and it typically stops.  Here for evaluation and management.    11/26/24 last visit I cauterized some vessels on the right side and he has had no bleeding after the first posttreatment day.  He states he had a little bit of bleeding out of his nose the day after the treatment but since then has had no further bleeding.  Using Vaseline or an equivalent several times a day.  No other signs, symptoms or complaints.    Social History     Socioeconomic History    Marital status:    Tobacco Use    Smoking status: Never    Smokeless tobacco: Never   Vaping Use    Vaping status: Never Used   Substance and Sexual Activity    Alcohol use: No    Drug use: No    Other Topics Concern    Caffeine Concern No       Family History   Problem Relation Age of Onset    Gastro-Intestinal Disorder Father         diverticulitis    Heart Disease Mother         coronary artery disease (cause of death)    Arthritis Sister     Diabetes Maternal Grandmother     Glaucoma Neg     Macular degeneration Neg        Past Medical History:    Age-related nuclear cataract of both eyes    Contusion of left hip and thigh, initial encounter    Cough    Epistaxis    Floater, vitreous, bilateral    Global amnesia    per N minutes in [place]    Headache    Hypothyroid    Inguinal hernia    Nocturia    Palpitations    toprol    Perforated ear drum    Physical exam, annual    Prostate cancer (HCC)    seed implant    Pulse irregularity       Past Surgical History:   Procedure Laterality Date    Colonoscopy      Electrocardiogram, complete  2012    scanned to media tab    Inguinal hernia repair      Inner ear surgery proc unlisted      mastoidectomy         REVIEW OF SYSTEMS    System Neg/Pos Details   Constitutional Negative Fatigue, fever and weight loss.   ENMT Negative Drooling.   Eyes Negative Blurred vision and vision changes.   Respiratory Negative Dyspnea and wheezing.   Cardio Negative Chest pain, irregular heartbeat/palpitations and syncope.   GI Negative Abdominal pain and diarrhea.   Endocrine Negative Cold intolerance and heat intolerance.   Neuro Negative Tremors.   Psych Negative Anxiety and depression.   Integumentary Negative Frequent skin infections, pigment change and rash.   Hema/Lymph Negative Easy bleeding and easy bruising.           PHYSICAL EXAM    There were no vitals taken for this visit.       Constitutional Normal Overall appearance - Normal.   Psychiatric Normal Orientation - Oriented to time, place, person & situation. Appropriate mood and affect.   Neck Exam Normal Inspection - Normal. Palpation - Normal. Parotid gland - Normal. Thyroid gland - Normal.   Eyes Normal  Conjunctiva - Right: Normal, Left: Normal. Pupil - Right: Normal, Left: Normal. Fundus - Right: Normal, Left: Normal.   Neurological Normal Memory - Normal. Cranial nerves - Cranial nerves II through XII grossly intact.   Head/Face Normal Facial features - Normal. Eyebrows - Normal. Skull - Normal.        Nasopharynx Normal External nose - Normal. Lips/teeth/gums - Normal. Tonsils - Normal. Oropharynx - Normal.   Ears Normal Inspection - Right: Normal, Left: Normal. Canal - Right: Normal, Left: Normal. TM - Right: Normal, Left: Normal.   Skin Normal Inspection - Normal.        Lymph Detail Normal Submental. Submandibular. Anterior cervical. Posterior cervical. Supraclavicular.        Nose/Mouth/Throat Normal External nose - Normal. Lips/teeth/gums - Normal. Tonsils - Normal. Oropharynx - Normal.   Nose/Mouth/Throat Normal Nares - Right: Normal Left: Normal. Septum -Normal  Turbinates - Right: Normal, Left: Normal.       Current Outpatient Medications:     atorvastatin 10 MG Oral Tab, Take 1 tablet (10 mg total) by mouth daily., Disp: 90 tablet, Rfl: 3    levothyroxine 100 MCG Oral Tab, Take 1 tablet (100 mcg total) by mouth before breakfast., Disp: 90 tablet, Rfl: 3    pantoprazole 40 MG Oral Tab EC, Take 1 tablet (40 mg total) by mouth before breakfast., Disp: 90 tablet, Rfl: 3    amLODIPine 5 MG Oral Tab, Take 1 tablet (5 mg total) by mouth daily., Disp: , Rfl:     rivaroxaban 15 MG Oral Tab, Take 1 tablet (15 mg total) by mouth daily with food., Disp: , Rfl:     Multiple Vitamins-Minerals (CENTRUM SILVER) Oral Tab, Take 1 tablet by mouth daily., Disp: , Rfl:     DilTIAZem HCl ER Coated Beads 120 MG Oral Capsule SR 24 Hr, Take 1 capsule (120 mg total) by mouth daily., Disp: 30 capsule, Rfl: 1  ASSESSMENT AND PLAN    1. Nosebleed  Doing very well no nosebleeds at this time.  No visible vessels on examination.  Continue Vaseline 3 times daily and consider using a humidifier at the bedside during the winter months.   Return to see me as needed        This note was prepared using Dragon Medical voice recognition dictation software. As a result errors may occur. When identified these errors have been corrected. While every attempt is made to correct errors during dictation discrepancies may still exist    Aniket Hernandez MD    11/26/2024    8:09 AM

## 2024-12-24 RX ORDER — PANTOPRAZOLE SODIUM 40 MG/1
40 TABLET, DELAYED RELEASE ORAL
Qty: 90 TABLET | Refills: 3 | Status: SHIPPED | OUTPATIENT
Start: 2024-12-24

## 2024-12-24 NOTE — TELEPHONE ENCOUNTER
Name from pharmacy: PANTOPRAZOLE SOD DR 40 MG TAB          Will file in chart as: PANTOPRAZOLE 40 MG Oral Tab EC    Sig: TAKE 1 TABLET BY MOUTH BEFORE BREAKFAST    Disp: 90 tablet    Refills: 3    Start: 12/19/2024    Class: Normal    Non-formulary    Last ordered: 1 year ago (7/19/2023) by Suman Perla MD    Last refill: 9/23/2024    Rx #: [id number]    Gastrointestional Medication Protocol [id number]/19/2024 12:08 AM   Protocol Details In person appointment or virtual visit in the past 12 mos or appointment in next 3 mos      To be filled at: [facility]/pharmacy #[id number] - [place], [place] - [address] AT [facility], [phone], [phone]     Chart reviewed, confirmed passes protocol. LOV 8/7/24. Refilled per protocol.

## 2025-02-27 ENCOUNTER — TELEPHONE (OUTPATIENT)
Dept: INTERNAL MEDICINE CLINIC | Facility: CLINIC | Age: OVER 89
End: 2025-02-27

## 2025-03-03 ENCOUNTER — NURSE TRIAGE (OUTPATIENT)
Dept: INTERNAL MEDICINE CLINIC | Facility: CLINIC | Age: OVER 89
End: 2025-03-03

## 2025-03-03 ENCOUNTER — OFFICE VISIT (OUTPATIENT)
Dept: INTERNAL MEDICINE CLINIC | Facility: CLINIC | Age: OVER 89
End: 2025-03-03

## 2025-03-03 VITALS
BODY MASS INDEX: 23.07 KG/M2 | SYSTOLIC BLOOD PRESSURE: 138 MMHG | WEIGHT: 164.81 LBS | OXYGEN SATURATION: 99 % | DIASTOLIC BLOOD PRESSURE: 64 MMHG | HEIGHT: 71 IN | HEART RATE: 72 BPM | TEMPERATURE: 98 F

## 2025-03-03 DIAGNOSIS — I10 PRIMARY HYPERTENSION: Primary | ICD-10-CM

## 2025-03-03 PROBLEM — M75.101 ROTATOR CUFF SYNDROME OF RIGHT SHOULDER: Status: ACTIVE | Noted: 2024-01-15

## 2025-03-03 RX ORDER — AMOXICILLIN 500 MG/1
CAPSULE ORAL
COMMUNITY
Start: 2025-01-28

## 2025-03-03 RX ORDER — IMIQUIMOD 12.5 MG/.25G
CREAM TOPICAL
COMMUNITY
Start: 2024-12-12

## 2025-03-03 NOTE — PROGRESS NOTES
Subjective:   Vinny Smith Jr. is a 89 year old male with PMH afib on xarelto, CKD St 3, HL, HTN hypothyroidism, OA who presents for Blood Pressure (High blood pressure )     Yesterday at 2:40pm noted tremors in his legs and arms so he checked his BP.   154/85 - HR 82. This concerned him so he took another amlodipine 5mg at 1440. Tremors subsided. A few hours later his BP was 133/83 - HR 79.   Elevated BP yesterday not associated with HA, dizziness, weakness, speech difficulties, vision changes. No difficulty breathing or pain in chest.  No recent illness or viral symptoms.    Today he woke up at 0100, took his meds at 0200. Went to swim at 0515 - swims 6 days/week  Today his BP was 143/83 - HR 75. Thought maybe he was having tremors in his hands only but has since subsided.    Takes amlodipine 5 morning between 2-4 in the morning, diltiazem 120 daily in the evening between 6-7pm - compliant with medications.    No recent dietary or activity changes    Mild edema to his ankles which is chronic for him.    He otherwise feels completely well.    History/Other:    Chief Complaint Reviewed and Verified  Nursing Notes Reviewed and   Verified  Tobacco Reviewed  Allergies Reviewed  Medications Reviewed    Medical History Reviewed  Surgical History Reviewed  Family History   Reviewed  Social History Reviewed         Tobacco:  He has never smoked tobacco.    Current Outpatient Medications   Medication Sig Dispense Refill    Imiquimod 5 % External Cream APPLY 1 A SMALL AMOUNT TO AFFECTED AREA ONCE A DAY FOR 2 WEEKS      PANTOPRAZOLE 40 MG Oral Tab EC TAKE 1 TABLET BY MOUTH BEFORE BREAKFAST 90 tablet 3    atorvastatin 10 MG Oral Tab Take 1 tablet (10 mg total) by mouth daily. 90 tablet 3    levothyroxine 100 MCG Oral Tab Take 1 tablet (100 mcg total) by mouth before breakfast. 90 tablet 3    amLODIPine 5 MG Oral Tab Take 1 tablet (5 mg total) by mouth daily.      rivaroxaban 15 MG Oral Tab Take 1 tablet (15 mg  total) by mouth daily with food.      Multiple Vitamins-Minerals (CENTRUM SILVER) Oral Tab Take 1 tablet by mouth daily.      DilTIAZem HCl ER Coated Beads 120 MG Oral Capsule SR 24 Hr Take 1 capsule (120 mg total) by mouth daily. 30 capsule 1    amoxicillin 500 MG Oral Cap TAKE 1 CAPSULE BY MOUTH THREE TIMES A DAY UNTIL GONE (Patient not taking: Reported on 3/3/2025)           Review of Systems:  Review of Systems  10 point review of systems otherwise negative with the exception of HPI and assessment and plan.    Objective:   /64   Pulse 72   Temp 97.6 °F (36.4 °C) (Temporal)   Ht 5' 11\" (1.803 m)   Wt 164 lb 12.8 oz (74.8 kg)   SpO2 99%   BMI 22.98 kg/m²  Estimated body mass index is 22.98 kg/m² as calculated from the following:    Height as of this encounter: 5' 11\" (1.803 m).    Weight as of this encounter: 164 lb 12.8 oz (74.8 kg).      Physical Exam  Vitals reviewed.   Constitutional:       General: He is not in acute distress.  Cardiovascular:      Rate and Rhythm: Rhythm irregular.      Heart sounds: Normal heart sounds.   Pulmonary:      Effort: Pulmonary effort is normal. No respiratory distress.   Musculoskeletal:      Right lower le+ Pitting Edema (to ankle only) present.      Left lower le+ Pitting Edema (to ankle only) present.   Skin:     General: Skin is warm and dry.   Neurological:      Mental Status: He is alert.         Assessment & Plan:   1. Primary hypertension (Primary)  - BP mildly elevated over baseline according to readings he brought from home -- all readings 110s-130s/60s-70s since early . Discussed that I hesitate to increase his meds based on a couple of outliers and no current symptoms.  - He is agreeable to checking his BP daily around noon for the next several days - he demonstrated proper technique in office. I will call him Friday between - to discuss the results. If consistently over 140/80 will increase amlodipine dose.  - discussed reasons to seek  immediate care including elevated BP accompanied by HA, dizziness, difficulty breathing speech or vision changes. He verbalized understanding.        Return for BP check on Friday.    DEVIN Alvarez, 3/3/2025, 8:43 AM     This note was prepared using Dragon Medical voice recognition dictation software. As a result errors may occur. When identified, these errors have been corrected. While every attempt is made to correct errors during dictation discrepancies may still exist.

## 2025-03-03 NOTE — TELEPHONE ENCOUNTER
Action Requested: Summary for Provider     []  Critical Lab, Recommendations Needed  [] Need Additional Advice  []   FYI    []   Need Orders  [] Need Medications Sent to Pharmacy  []  Other     SUMMARY: c/o high blood pressure yesterday that caused tremors in his whole body, he has been taking his blood pressure medications. blood pressure and tremors improved now but he would like to be seen, scheduled an appointment.     Reason for call: Hypertension  Onset: 24 hours    The patient states yeeretseday his blood pressure was 160/101 and he developed some tremors in his whole body, he has been taking his blood pressure medication. this morning it was 140/83 and the tremors are just in his fingers. He is not c/o headache or chest pain at all. No SOB, states he feels better but would like this to be addressed and would like to be seen in the office. Scheduled an appointment for today.     Future Appointments   Date Time Provider Department Center   3/3/2025  9:00 AM Carola Hernandez APRN [facility] [place]   6/6/2025  8:00 AM Suman Perla MD [facility] [place]         Reason for Disposition   Patient wants to be seen    Protocols used: Blood Pressure - High-A-OH

## 2025-03-03 NOTE — PATIENT INSTRUCTIONS
-Keep your medications at their current doses  -Please check your blood pressure daily around noon and record the reading  -I will call you Friday to talk about the readings - if consistently elevated above 140/80, we can increase your amlodipine dose to 7.5mg and check your BP again for the next week.  -As discussed - if your BP is elevated and accompanied by headache, dizziness, weakness on one side, vision changes, speech difficulties, call 911.

## 2025-03-22 NOTE — TELEPHONE ENCOUNTER
Patient called to request a refill on below medication.     [facility] on file verified.       Medication Quantity Refills Start End   DilTIAZem HCl ER Coated Beads 120 MG Oral Capsule SR 24 Hr 30 capsule 1 12/18/2018 --   Sig:   Take 1 capsule (120 mg total) by mouth daily.     Route:   Oral     Order #:   [id number]

## 2025-03-27 RX ORDER — DILTIAZEM HYDROCHLORIDE 120 MG/1
120 CAPSULE, COATED, EXTENDED RELEASE ORAL DAILY
Qty: 30 CAPSULE | Refills: 1 | OUTPATIENT
Start: 2025-03-27

## 2025-04-01 RX ORDER — ATORVASTATIN CALCIUM 10 MG/1
10 TABLET, FILM COATED ORAL DAILY
Qty: 90 TABLET | Refills: 3 | Status: SHIPPED | OUTPATIENT
Start: 2025-04-01

## 2025-04-01 NOTE — TELEPHONE ENCOUNTER
Refill passed per [facility] protocol.    Please review pended refill request as unable to refill due to high/very high interaction warning copied here:    High  Drug-Drug: dilTIAZem ER and atorvastatinPlasma concentrations and pharmacologic effects of atorvastatin may be increased by coadministration of diltiazem. The risk of myopathy and rhabdomyolysis may be increased.    Requested Prescriptions   Pending Prescriptions Disp Refills    ATORVASTATIN 10 MG Oral Tab [Pharmacy Med Name: ATORVASTATIN 10 MG TABLET] 90 tablet 3     Sig: TAKE 1 TABLET BY MOUTH EVERY DAY       Cholesterol Medication Protocol Passed - 4/1/2025  5:12 PM        Passed - ALT < 80     Lab Results   Component Value Date    ALT 18 08/01/2024             Passed - ALT resulted within past year        Passed - Lipid panel within past 12 months     Lab Results   Component Value Date    CHOLEST 143 08/01/2024    TRIG 50 08/01/2024    HDL 56 08/01/2024    LDL 76 08/01/2024    VLDL 8 08/01/2024    NONHDLC 87 08/01/2024             Passed - In person appointment or virtual visit in the past 12 mos or appointment in next 3 mos     Recent Outpatient Visits              [facility]    [facility]    5 months ago [facility]iket Bowen MD    Office Visit    7 months ago [facility]    [facility]    11 months ago Paroxysmal atrial fibrillation (HCC)    [facility]          Future Appointments         Provider Department Appt Notes    In 2 months Suman Perla MD [facility],  [address], [place] medicare px last px was 08/09/2023                    Passed - Medication is active on med list           Future Appointments         Provider Department Appt Notes    In 2 months Suman Perla MD [facility], [address], [place] medicare px last px was 08/09/2023          Recent Outpatient Visits              4 weeks ago Primary hypertension    [facility] Carola Hernandez APRN    Office Visit    4 months ago [facility]ble[place]    [facility] [place]    [facility]    5 months ago [facility] Aniket Bowen MD    Office Visit    7 months ago [facility]    [facility]    11 months ago [facility]    Office Visit

## 2025-04-09 RX ORDER — LEVOTHYROXINE SODIUM 100 UG/1
100 TABLET ORAL
Qty: 90 TABLET | Refills: 3 | Status: SHIPPED | OUTPATIENT
Start: 2025-04-09

## 2025-04-18 ENCOUNTER — APPOINTMENT (OUTPATIENT)
Dept: CT IMAGING | Facility: HOSPITAL | Age: OVER 89
End: 2025-04-18
Attending: HOSPITALIST
Payer: MEDICARE

## 2025-04-18 ENCOUNTER — HOSPITAL ENCOUNTER (INPATIENT)
Facility: HOSPITAL | Age: OVER 89
LOS: 7 days | Discharge: NURSING, REHAB OR LONG-TERM CARE | End: 2025-04-25
Attending: EMERGENCY MEDICINE | Admitting: HOSPITALIST
Payer: MEDICARE

## 2025-04-18 DIAGNOSIS — S06.350D TRAUMATIC LEFT-SIDED INTRACEREBRAL HEMORRHAGE WITHOUT LOSS OF CONSCIOUSNESS, SUBSEQUENT ENCOUNTER: ICD-10-CM

## 2025-04-18 DIAGNOSIS — E83.52 HYPERCALCEMIA: Primary | ICD-10-CM

## 2025-04-18 LAB
ALBUMIN SERPL-MCNC: 3.9 G/DL (ref 3.2–4.8)
ALBUMIN/GLOB SERPL: 1.3 {RATIO} (ref 1–2)
ALP LIVER SERPL-CCNC: 111 U/L (ref 45–117)
ALT SERPL-CCNC: 26 U/L (ref 10–49)
ANION GAP SERPL CALC-SCNC: 8 MMOL/L (ref 0–18)
AST SERPL-CCNC: 43 U/L (ref ?–34)
BASOPHILS # BLD AUTO: 0.06 X10(3) UL (ref 0–0.2)
BASOPHILS NFR BLD AUTO: 0.6 %
BILIRUB SERPL-MCNC: 0.4 MG/DL (ref 0.2–0.9)
BILIRUB UR QL: NEGATIVE
BUN BLD-MCNC: 26 MG/DL (ref 9–23)
BUN/CREAT SERPL: 15.9 (ref 10–20)
CALCIUM BLD-MCNC: 13.5 MG/DL (ref 8.7–10.4)
CALCIUM BLD-MCNC: 13.6 MG/DL (ref 8.7–10.4)
CALCIUM BLD-MCNC: 13.6 MG/DL (ref 8.7–10.4)
CALCIUM BLD-MCNC: 13.9 MG/DL (ref 8.7–10.4)
CHLORIDE SERPL-SCNC: 102 MMOL/L (ref 98–112)
CLARITY UR: CLEAR
CO2 SERPL-SCNC: 30 MMOL/L (ref 21–32)
CREAT BLD-MCNC: 1.64 MG/DL (ref 0.7–1.3)
DEPRECATED RDW RBC AUTO: 44.6 FL (ref 35.1–46.3)
EGFRCR SERPLBLD CKD-EPI 2021: 38 ML/MIN/1.73M2 (ref 60–?)
EOSINOPHIL # BLD AUTO: 0.03 X10(3) UL (ref 0–0.7)
EOSINOPHIL NFR BLD AUTO: 0.3 %
ERYTHROCYTE [DISTWIDTH] IN BLOOD BY AUTOMATED COUNT: 14.7 % (ref 11–15)
GLOBULIN PLAS-MCNC: 2.9 G/DL (ref 2–3.5)
GLUCOSE BLD-MCNC: 148 MG/DL (ref 70–99)
GLUCOSE UR-MCNC: NORMAL MG/DL
HCT VFR BLD AUTO: 30.2 % (ref 39–53)
HGB BLD-MCNC: 9.8 G/DL (ref 13–17.5)
HYALINE CASTS #/AREA URNS AUTO: PRESENT /LPF
IMM GRANULOCYTES # BLD AUTO: 0.16 X10(3) UL (ref 0–1)
IMM GRANULOCYTES NFR BLD: 1.6 %
IRON SATN MFR SERPL: 13 % (ref 20–50)
IRON SERPL-MCNC: 45 UG/DL (ref 65–175)
KETONES UR-MCNC: NEGATIVE MG/DL
LEUKOCYTE ESTERASE UR QL STRIP.AUTO: NEGATIVE
LYMPHOCYTES # BLD AUTO: 1.03 X10(3) UL (ref 1–4)
LYMPHOCYTES NFR BLD AUTO: 10.4 %
MAGNESIUM SERPL-MCNC: 2 MG/DL (ref 1.6–2.6)
MCH RBC QN AUTO: 27.1 PG (ref 26–34)
MCHC RBC AUTO-ENTMCNC: 32.5 G/DL (ref 31–37)
MCV RBC AUTO: 83.7 FL (ref 80–100)
MONOCYTES # BLD AUTO: 1.03 X10(3) UL (ref 0.1–1)
MONOCYTES NFR BLD AUTO: 10.4 %
NEUTROPHILS # BLD AUTO: 7.62 X10 (3) UL (ref 1.5–7.7)
NEUTROPHILS # BLD AUTO: 7.62 X10(3) UL (ref 1.5–7.7)
NEUTROPHILS NFR BLD AUTO: 76.7 %
NITRITE UR QL STRIP.AUTO: NEGATIVE
OSMOLALITY SERPL CALC.SUM OF ELEC: 298 MOSM/KG (ref 275–295)
PH UR: 5.5 [PH] (ref 5–8)
PLATELET # BLD AUTO: 244 10(3)UL (ref 150–450)
POTASSIUM SERPL-SCNC: 3.9 MMOL/L (ref 3.5–5.1)
PROT SERPL-MCNC: 6.8 G/DL (ref 5.7–8.2)
PROT UR-MCNC: NEGATIVE MG/DL
PTH-INTACT SERPL-MCNC: 7.1 PG/ML (ref 18.5–88)
RBC # BLD AUTO: 3.61 X10(6)UL (ref 3.8–5.8)
SODIUM SERPL-SCNC: 140 MMOL/L (ref 136–145)
SP GR UR STRIP: 1.01 (ref 1–1.03)
TOTAL IRON BINDING CAPACITY: 348 UG/DL (ref 250–425)
TRANSFERRIN SERPL-MCNC: 290 MG/DL (ref 215–365)
TSI SER-ACNC: 3.78 UIU/ML (ref 0.55–4.78)
UROBILINOGEN UR STRIP-ACNC: NORMAL
VIT D+METAB SERPL-MCNC: 51.4 NG/ML (ref 30–100)
WBC # BLD AUTO: 9.9 X10(3) UL (ref 4–11)

## 2025-04-18 PROCEDURE — 74176 CT ABD & PELVIS W/O CONTRAST: CPT | Performed by: HOSPITALIST

## 2025-04-18 PROCEDURE — 71250 CT THORAX DX C-: CPT | Performed by: HOSPITALIST

## 2025-04-18 PROCEDURE — 99223 1ST HOSP IP/OBS HIGH 75: CPT | Performed by: HOSPITALIST

## 2025-04-18 PROCEDURE — 99222 1ST HOSP IP/OBS MODERATE 55: CPT | Performed by: INTERNAL MEDICINE

## 2025-04-18 RX ORDER — AMLODIPINE BESYLATE 5 MG/1
5 TABLET ORAL DAILY
Status: DISCONTINUED | OUTPATIENT
Start: 2025-04-18 | End: 2025-04-25

## 2025-04-18 RX ORDER — SODIUM CHLORIDE 9 MG/ML
INJECTION, SOLUTION INTRAVENOUS CONTINUOUS
Status: DISCONTINUED | OUTPATIENT
Start: 2025-04-18 | End: 2025-04-23

## 2025-04-18 RX ORDER — BISACODYL 10 MG
10 SUPPOSITORY, RECTAL RECTAL
Status: DISCONTINUED | OUTPATIENT
Start: 2025-04-18 | End: 2025-04-25

## 2025-04-18 RX ORDER — LEVOTHYROXINE SODIUM 100 UG/1
100 TABLET ORAL
Status: DISCONTINUED | OUTPATIENT
Start: 2025-04-18 | End: 2025-04-25

## 2025-04-18 RX ORDER — POLYETHYLENE GLYCOL 3350 17 G/17G
17 POWDER, FOR SOLUTION ORAL DAILY PRN
Status: DISCONTINUED | OUTPATIENT
Start: 2025-04-18 | End: 2025-04-25

## 2025-04-18 RX ORDER — MORPHINE SULFATE 2 MG/ML
2 INJECTION, SOLUTION INTRAMUSCULAR; INTRAVENOUS EVERY 2 HOUR PRN
Status: DISCONTINUED | OUTPATIENT
Start: 2025-04-18 | End: 2025-04-25

## 2025-04-18 RX ORDER — ONDANSETRON 2 MG/ML
4 INJECTION INTRAMUSCULAR; INTRAVENOUS EVERY 6 HOURS PRN
Status: DISCONTINUED | OUTPATIENT
Start: 2025-04-18 | End: 2025-04-25

## 2025-04-18 RX ORDER — ACETAMINOPHEN 325 MG/1
650 TABLET ORAL EVERY 4 HOURS PRN
Status: DISCONTINUED | OUTPATIENT
Start: 2025-04-18 | End: 2025-04-25

## 2025-04-18 RX ORDER — HYDROCODONE BITARTRATE AND ACETAMINOPHEN 5; 325 MG/1; MG/1
2 TABLET ORAL EVERY 4 HOURS PRN
Refills: 0 | Status: DISCONTINUED | OUTPATIENT
Start: 2025-04-18 | End: 2025-04-25

## 2025-04-18 RX ORDER — SENNOSIDES 8.6 MG
17.2 TABLET ORAL NIGHTLY PRN
Status: DISCONTINUED | OUTPATIENT
Start: 2025-04-18 | End: 2025-04-25

## 2025-04-18 RX ORDER — MORPHINE SULFATE 2 MG/ML
1 INJECTION, SOLUTION INTRAMUSCULAR; INTRAVENOUS EVERY 2 HOUR PRN
Status: DISCONTINUED | OUTPATIENT
Start: 2025-04-18 | End: 2025-04-25

## 2025-04-18 RX ORDER — HYDROCODONE BITARTRATE AND ACETAMINOPHEN 5; 325 MG/1; MG/1
1 TABLET ORAL EVERY 4 HOURS PRN
Refills: 0 | Status: DISCONTINUED | OUTPATIENT
Start: 2025-04-18 | End: 2025-04-25

## 2025-04-18 RX ORDER — MORPHINE SULFATE 4 MG/ML
4 INJECTION, SOLUTION INTRAMUSCULAR; INTRAVENOUS EVERY 2 HOUR PRN
Status: DISCONTINUED | OUTPATIENT
Start: 2025-04-18 | End: 2025-04-25

## 2025-04-18 RX ORDER — CALCITONIN SALMON 200 [IU]/.09ML
1 SPRAY, METERED NASAL ONCE
Status: COMPLETED | OUTPATIENT
Start: 2025-04-18 | End: 2025-04-18

## 2025-04-18 RX ORDER — PANTOPRAZOLE SODIUM 40 MG/1
40 TABLET, DELAYED RELEASE ORAL
Status: DISCONTINUED | OUTPATIENT
Start: 2025-04-18 | End: 2025-04-25

## 2025-04-18 RX ORDER — METOCLOPRAMIDE HYDROCHLORIDE 5 MG/ML
5 INJECTION INTRAMUSCULAR; INTRAVENOUS EVERY 8 HOURS PRN
Status: DISCONTINUED | OUTPATIENT
Start: 2025-04-18 | End: 2025-04-25

## 2025-04-18 RX ORDER — DILTIAZEM HYDROCHLORIDE 120 MG/1
120 CAPSULE, EXTENDED RELEASE ORAL DAILY
Status: DISCONTINUED | OUTPATIENT
Start: 2025-04-18 | End: 2025-04-25

## 2025-04-18 RX ORDER — ATORVASTATIN CALCIUM 10 MG/1
10 TABLET, FILM COATED ORAL DAILY
Status: DISCONTINUED | OUTPATIENT
Start: 2025-04-18 | End: 2025-04-25

## 2025-04-18 NOTE — ED PROVIDER NOTES
Patient Seen in: [facility] Emergency Department      History     Chief Complaint   Patient presents with    Fall     Stated Complaint: Fall,weakness,unsteady    Subjective:   HPI    89-year-old male presents for evaluation for weakness.  Patient reports that yesterday he was bending over and fell landing on his left arm.  He did not hit his head or lose consciousness.  Paramedics came and assisted him up and he was able to ambulate without difficulty.  He was even able to get into his car and drive it to show the paramedics that he was okay.  He states that today he was feeling very weak and having difficulty getting up to stand from a seated position.  Son reports that he seemed a little shaky.  He states that he is otherwise in his normal state of health.  He denies any headache, chest pain, shortness of breath, fevers, chills, nausea, vomiting, diarrhea, constipation, dysuria, hematuria.  He does report that he is incontinent at times.  History of Present Illness               Objective:     Past Medical History:    Age-related nuclear cataract of both eyes    Contusion of left hip and thigh, initial encounter    Cough    Epistaxis    Floater, vitreous, bilateral    Global amnesia    per N minutes in [place]    Headache    Hypothyroid    Inguinal hernia    Nocturia    Palpitations    toprol    Perforated ear drum    Physical exam, annual    Prostate cancer (HCC)    seed implant    Pulse irregularity              Past Surgical History:   Procedure Laterality Date    Colonoscopy      Electrocardiogram, complete  2012    scanned to media tab    Inguinal hernia repair      Inner ear surgery proc unlisted      mastoidectomy                Social History     Socioeconomic History    Marital status:    Tobacco Use    Smoking status: Never    Smokeless tobacco: Never   Vaping Use    Vaping status: Never Used   Substance and Sexual Activity    Alcohol use: No    Drug use: No   Other Topics  Concern    Caffeine Concern No                                Physical Exam     ED Triage Vitals   BP 04/18/25 0644 118/63   Pulse 04/18/25 0644 68   Resp 04/18/25 0644 20   Temp 04/18/25 0644 97.5 °F (36.4 °C)   Temp src 04/18/25 0644 Oral   SpO2 04/18/25 0644 95 %   O2 Device 04/18/25 0715 None (Room air)       Current Vitals:   Vital Signs  BP: 151/87  Pulse: 62  Resp: 13  Temp: 97.5 °F (36.4 °C)  Temp src: Oral  MAP (mmHg): (!) 106    Oxygen Therapy  SpO2: 95 %  O2 Device: None (Room air)        Physical Exam  Vitals and nursing note reviewed.   Constitutional:       Appearance: Normal appearance. He is well-developed. He is not ill-appearing or diaphoretic.   HENT:      Head: Normocephalic and atraumatic.      Right Ear: External ear normal.      Left Ear: External ear normal.      Nose: Nose normal. No nasal deformity.      Mouth/Throat:      Lips: Pink.   Eyes:      General: Lids are normal.      Extraocular Movements: Extraocular movements intact.      Conjunctiva/sclera: Conjunctivae normal.      Pupils: Pupils are equal, round, and reactive to light.   Cardiovascular:      Rate and Rhythm: Normal rate. Rhythm irregular.      Pulses: Normal pulses.           Radial pulses are 2+ on the right side and 2+ on the left side.        Dorsalis pedis pulses are 2+ on the right side and 2+ on the left side.        Posterior tibial pulses are 2+ on the right side and 2+ on the left side.      Heart sounds: Normal heart sounds.   Pulmonary:      Effort: Pulmonary effort is normal. No respiratory distress.      Breath sounds: Normal breath sounds and air entry.   Abdominal:      General: Bowel sounds are normal.      Palpations: Abdomen is soft.      Tenderness: There is no abdominal tenderness.   Musculoskeletal:         General: No deformity. Normal range of motion.      Right shoulder: Normal.      Left shoulder: Normal.      Left upper arm: Normal.      Right elbow: Normal.      Left elbow: Laceration (abrasion)  present.      Left forearm: Normal.      Right wrist: Normal. No tenderness, bony tenderness or snuff box tenderness. Normal pulse.      Left wrist: Normal. No tenderness, bony tenderness or snuff box tenderness. Normal pulse.      Right hand: Normal. Normal capillary refill. Normal pulse.      Left hand: Normal. Normal capillary refill. Normal pulse.      Cervical back: Normal, full passive range of motion without pain and normal range of motion. No tenderness or bony tenderness. No spinous process tenderness or muscular tenderness.      Thoracic back: Normal. No tenderness or bony tenderness.      Lumbar back: No tenderness or bony tenderness.      Comments: Bruising to left forearm.     Skin:     General: Skin is warm and dry.      Capillary Refill: Capillary refill takes less than 2 seconds.      Coloration: Skin is not cyanotic or pale.   Neurological:      General: No focal deficit present.      Mental Status: He is alert and oriented to person, place, and time.      Cranial Nerves: No cranial nerve deficit, dysarthria or facial asymmetry.      Sensory: Sensation is intact.      Motor: Motor function is intact.      Coordination: Coordination is intact.      Gait: Gait is intact.      Comments: No gross motor or sensory deficits   Psychiatric:         Attention and Perception: Attention normal.         Mood and Affect: Mood normal.         Speech: Speech normal.           Physical Exam                ED Course     Labs Reviewed   CBC WITH DIFFERENTIAL WITH PLATELET - Abnormal; Notable for the following components:       Result Value    RBC 3.61 (*)     HGB 9.8 (*)     HCT 30.2 (*)     Monocyte Absolute 1.03 (*)     All other components within normal limits   COMP METABOLIC PANEL (14) - Abnormal; Notable for the following components:    Glucose 148 (*)     BUN 26 (*)     Creatinine 1.64 (*)     Calcium, Total 13.6 (*)     Calculated Osmolality 298 (*)     eGFR-Cr 38 (*)     AST 43 (*)     All other components  within normal limits   URINALYSIS WITH CULTURE REFLEX - Abnormal; Notable for the following components:    Blood Urine 2+ (*)     RBC Urine 6-10 (*)     Bacteria Urine Rare (*)     Hyaline Casts Present (*)     All other components within normal limits   MAGNESIUM - Normal   PTH, INTACT   PARATHYROID HORMONE-RELATED PEPTIDE   VITAMIN D    Narrative:     The following orders were created for panel order Vitamin D.  Procedure                               Abnormality         Status                     ---------                               -----------         ------                     Vitamin D, 25-Hydroxy[[id number]]                                                         Please view results for these tests on the individual orders.   VITAMIN D, 25-HYDROXY     EKG    Rate, intervals and axes as noted on EKG Report.  Rate: 63  Rhythm: Sinus Rhythm  Reading: no stemi, interpreted by myself.               Results                                 MDM          Admission disposition: 4/18/2025  9:00 AM           Medical Decision Making  Differential diagnosis includes but is not limited to electrolyte disturbance, arrhythmia, urinary tract infection, etc.    Patient CBC was unremarkable.  CMP shows chronic renal insufficiency and a critically high calcium of 13.6 with normal albumin and protein.  Patient denies any significant exogenous calcium intake.  Endocrinology is consulted and recommends obtaining a PTH, vitamin D and giving him IV fluids and a dose of calcitonin with repeat calcium level 4 hours later.      Rhythm Strip: Rate 68 sinus rhythm as interpreted by myself. The cardiac monitor was ordered secondary to the patient's electrolyte abnormality.     Complicating factors: The patient  has a past medical history of Age-related nuclear cataract of both eyes (5/26/2021), Contusion of left hip and thigh, initial encounter (2/17/2022), Cough (2/5/2020), Epistaxis (3/24/2021), Floater, vitreous, bilateral (5/26/2021),  Global amnesia (2010), Headache (9/13/2012), Hypothyroid (2010), Inguinal hernia, Nocturia (5/14/2008), Palpitations (2001), Perforated ear drum, Physical exam, annual (3/24/2021), Prostate cancer (HCC) (2007), and Pulse irregularity (4/13/2015). and  has a past surgical history that includes electrocardiogram, complete (08-); Inguinal hernia repair; inner ear surgery proc unlisted; and colonoscopy. that contribute to the medical complexity of this ED evaluation.     Medical Record Review: I personally reviewed available prior medical records for any recent pertinent discharge summaries, testing, and procedures, and reviewed those reports.        Problems Addressed:  Hypercalcemia: acute illness or injury with systemic symptoms    Amount and/or Complexity of Data Reviewed  Independent Historian:      Details: Son as per HPI  External Data Reviewed: notes.     Details: Echo from 12/17/18 reviewed. EF 70%  Labs: ordered. Decision-making details documented in ED Course.  ECG/medicine tests: ordered and independent interpretation performed. Decision-making details documented in ED Course.  Discussion of management or test interpretation with external provider(s): See above discussion with Dr. Benavidez with endocrinology.  Dr. Olivia accepts admission under Dr. Varner.    Risk  Decision regarding hospitalization.        Disposition and Plan     Clinical Impression:  1. Hypercalcemia         Disposition:  Admit  4/18/2025  9:00 am    Follow-up:  No follow-up provider specified.  We recommend that you schedule follow up care with a primary care provider within the next three months to obtain basic health screening including reassessment of your blood pressure.      Medications Prescribed:  Current Discharge Medication List          Supplementary Documentation:         Hospital Problems       Present on Admission  Date Reviewed: 3/3/2025          ICD-10-CM Noted POA    * (Principal) Hypercalcemia E83.52 4/18/2025  Unknown

## 2025-04-18 NOTE — CONSULTS
[facility]  part of [facility]    Report of Consultation    Vinny Smith Jr. Patient Status:  Inpatient    1928 MRN [id number]   Location [facility]W Attending Mark Varner MD   Hosp Day # 0 PCP Suman Perla MD     Date of Admission:  2025  Date of Consult:  2025    Reason for Consultation:  Hypercalcemia    History of Present Illness:  Vinny Smith Jr. is a a(n) 89 year old male who presented to the emergency room after a fall and generalized weakness for the last few days.  He was noted to be hypercalcemic in the emergency room.  Calcium of 13.6 followed by 13.9 with normal albumin level.  He might take Tums occasionally but does not take any calcium supplements on a regular basis.  Prior calcium levels have been normal.    Endocrinology is consulted for management of high calcium levels.    History:  Past Medical History[1]  Past Surgical History[2]  Family History[3]   reports that he has never smoked. He has never used smokeless tobacco. He reports that he does not drink alcohol and does not use drugs.    Allergies:  Allergies[4]    Medications:  Current Hospital Medications[5]    A comprehensive 10 point review of systems was completed.  Pertinent positives and negatives noted in the the HPI.    Physical Exam:  Blood pressure 149/80, pulse 94, temperature 97.8 °F (36.6 °C), temperature source Oral, resp. rate 18, height 5' 11\" (1.803 m), weight 165 lb (74.8 kg), SpO2 96%.        General Appearance:  alert, well developed, in no acute distress  Head: Atraumatic  Eyes:  normal conjunctivae, sclera., normal sclera and normal pupils  Throat/Neck: normal sound to voice. Normal hearing, normal speech  Respiratory:  Speaking in full sentences, non-labored. no increased work of breathing, no audible wheezing    Neuro: motor grossly intact, moving all extremities without difficulty  Psychiatric:  oriented to time, self, and place  Extremities: no obvious  extremity swelling      Impression and Plan:  Problem List[6]    Hypercalcemia  PTH is appropriately low. Vitamin D is normal   Hypercalcemia is related to non-PTH causes  Vit 1,25 OH , PTHrp, SPEP  ordered    Patient is on IV fluids  He has been given calcitonin 1 dose this morning  Recommend calcium monitoring every 4 hours for now    Thank you for the consult. We will follow.    Mariella Benavidez MD         [1]   Past Medical History:   Age-related nuclear cataract of both eyes    Contusion of left hip and thigh, initial encounter    Cough    Epistaxis    Floater, vitreous, bilateral    Global amnesia    per N minutes in [place]    Headache    Hypothyroid    Inguinal hernia    Nocturia    Palpitations    toprol    Perforated ear drum    Physical exam, annual    Prostate cancer (HCC)    seed implant    Pulse irregularity   [2]   Past Surgical History:  Procedure Laterality Date    Colonoscopy      Electrocardiogram, complete  2012    scanned to media tab    Inguinal hernia repair      Inner ear surgery proc unlisted      mastoidectomy   [3]   Family History  Problem Relation Age of Onset    Gastro-Intestinal Disorder Father         diverticulitis    Heart Disease Mother         coronary artery disease (cause of death)    Arthritis Sister     Diabetes Maternal Grandmother     Glaucoma Neg     Macular degeneration Neg    [4]   Allergies  Allergen Reactions    Paxil [Paroxetine] RASH    Sulfacetamide      Other reaction(s): SULFA (SULFONAMIDE ANTIBIOTICS)   [5]   Current Facility-Administered Medications:     amLODIPine (Norvasc) tab 5 mg, 5 mg, Oral, Daily    atorvastatin (Lipitor) tab 10 mg, 10 mg, Oral, Daily    dilTIAZem ER (Dilacor XR) 24 hr cap 120 mg, 120 mg, Oral, Daily    levothyroxine (Synthroid) tab 100 mcg, 100 mcg, Oral, Before breakfast    rivaroxaban (Xarelto) tab 15 mg, 15 mg, Oral, Daily with food    pantoprazole (Protonix) DR tab 40 mg, 40 mg, Oral, Before breakfast    sodium chloride 0.9%  infusion, , Intravenous, Continuous    acetaminophen (Tylenol) tab 650 mg, 650 mg, Oral, Q4H PRN **OR** HYDROcodone-acetaminophen (Norco) 5-325 MG per tab 1 tablet, 1 tablet, Oral, Q4H PRN **OR** HYDROcodone-acetaminophen (Norco) 5-325 MG per tab 2 tablet, 2 tablet, Oral, Q4H PRN    morphINE PF 2 MG/ML injection 1 mg, 1 mg, Intravenous, Q2H PRN **OR** morphINE PF 2 MG/ML injection 2 mg, 2 mg, Intravenous, Q2H PRN **OR** morphINE PF 4 MG/ML injection 4 mg, 4 mg, Intravenous, Q2H PRN    polyethylene glycol (PEG 3350) (Miralax) 17 g oral packet 17 g, 17 g, Oral, Daily PRN    sennosides (Senokot) tab 17.2 mg, 17.2 mg, Oral, Nightly PRN    bisacodyl (Dulcolax) 10 MG rectal suppository 10 mg, 10 mg, Rectal, Daily PRN    ondansetron (Zofran) 4 MG/2ML injection 4 mg, 4 mg, Intravenous, Q6H PRN    metoclopramide (Reglan) 5 mg/mL injection 5 mg, 5 mg, Intravenous, Q8H PRN  [6]   Patient Active Problem List  Diagnosis    Hypothyroidism    Hearing loss    Mixed hyperlipidemia    Primary hypertension    Atrial fibrillation (HCC)    Stage 3a chronic kidney disease (HCC)    Atherosclerosis of aorta    Other emphysema (HCC)    Age-related nuclear cataract of both eyes    Floater, vitreous, bilateral    Intracranial hemorrhage (HCC)    Osteoarthritis of multiple joints    History of prostate cancer    Abdominal pain    Acute prostatitis    Arthropathy    Contusion of face, scalp, and neck    Dysphonia    Elevated prostate specific antigen (PSA)    Hematuria    Hyperplasia of prostate without lower urinary tract symptoms (LUTS)    Impacted cerumen    Microscopic hematuria    Rotator cuff syndrome of right shoulder    Urinary frequency    Hypercalcemia

## 2025-04-18 NOTE — H&P
[facility]  part of [facility]  HISTORY AND PHYSICAL       Vinny Smith Jr. Patient Status:  Inpatient    1928  89 year old CSN [id number]   Location 502/502-A Attending Mark Varner MD     PCP Suman Perla MD         DATE OF ADMISSION: 2025     CHIEF COMPLAINT: weakness      HISTORY OF PRESENT ILLNESS  Vinny Smith Jr. is a 89 year old male with pmh of htn, a. Fib who has been in good shape historically.  He swims for 20 minutes 6 days a week.  He noted feeling weak over the past 6 weeks.  He was doing some work in his garage yesterday and had a fall.  His family notes that he was weaker than his usual self.  He was brought to the ED for evaluation.  His calcium was noted to be 13.9.  He does not take any calcium supplements she does take Tums occasionally but no more than 2 tablets 2-3 times a week.  He does have a history of prostate cancer.   He had a basal cell CA removed from the upper lip several weeks ago.  It was not a Mohs procedure.     Review of systems remains negative       PAST MEDICAL HISTORY  Past Medical History[1]     PAST SURGICAL HISTORY  Past Surgical History[2]    ALLERGIES   Paxil [paroxetine] and Sulfacetamide    MEDICATIONS  Current Discharge Medication List        CONTINUE these medications which have NOT CHANGED    Details   levothyroxine 100 MCG Oral Tab Take 1 tablet (100 mcg total) by mouth before breakfast.  Qty: 90 tablet, Refills: 3      ATORVASTATIN 10 MG Oral Tab TAKE 1 TABLET BY MOUTH EVERY DAY  Qty: 90 tablet, Refills: 3      PANTOPRAZOLE 40 MG Oral Tab EC TAKE 1 TABLET BY MOUTH BEFORE BREAKFAST  Qty: 90 tablet, Refills: 3      amLODIPine 5 MG Oral Tab Take 1 tablet (5 mg total) by mouth in the morning.      rivaroxaban 15 MG Oral Tab Take 1 tablet (15 mg total) by mouth daily with food.      Multiple Vitamins-Minerals (CENTRUM SILVER) Oral Tab Take 1 tablet by mouth in the morning.      DilTIAZem HCl ER Coated Beads 120 MG Oral  Capsule SR 24 Hr Take 1 capsule (120 mg total) by mouth daily.  Qty: 30 capsule, Refills: 1      amoxicillin 500 MG Oral Cap TAKE 1 CAPSULE BY MOUTH THREE TIMES A DAY UNTIL GONE      Imiquimod 5 % External Cream              SOCIAL HISTORY  Social Hx on file[3]    FAMILY HISTORY  Family History[4]    PHYSICAL EXAM  Vital signs:  /80 (BP Location: Right arm)   Pulse 90   Temp 97.8 °F (36.6 °C) (Oral)   Resp 18   Ht 5' 11\" (1.803 m)   Wt 165 lb (74.8 kg)   SpO2 96%   BMI 23.01 kg/m²      GENERAL:  Awake and alert, in no acute distress.  HEENT: Normocephalic.  Extraocular muscles were intact.    NECK:  Supple. Trach midline  CHEST:  Symmetrical movement on inspiration  HEART:  Regular rhythm.  Regular rate   LUNGS:  Air entry was good.  No increased work of breathing or wheezes   ABDOMEN: Soft and non-tender.    MUSCULOSKELETAL:  No obvious abnormalities noted  EXTREMITIES: No edema appreciated  NEUROLOGICAL:  There was no new focal deficit.  SKIN:  Warm and well perfused  PSYCHIATRIC: Normal mood      IMAGING    No results found.     Data:  Recent Labs   Lab 04/18/25  0728   RBC 3.61*   HGB 9.8*   HCT 30.2*   MCV 83.7   MCH 27.1   MCHC 32.5   RDW 14.7   NEPRELIM 7.62   WBC 9.9   .0     Recent Labs   Lab 04/18/25  0728 04/18/25  0916   *  --    BUN 26*  --    CREATSERUM 1.64*  --    CA 13.6* 13.6*  13.9*   ALB 3.9  --      --    K 3.9  --      --    CO2 30.0  --    ALKPHO 111  --    AST 43*  --    ALT 26  --    BILT 0.4  --    TP 6.8  --        ASSESSMENT/PLAN        Hypercalcemia  -calcium 13.6--> 13.9  -pth, appropriately low  -vit D is normal   -pthrp spep and imaging ordered  -given calcitonin given  -start ivf  -check CT chest ab pelvis    HTN  A. Fib  -resume home meds, amlodipine, diltiazem    HL  -cont lipitor        Plan of care discussed with patient at bedside.  Discussed with ED physician and RN. Decision made that pt needs hospitalization for further  management/monitoring.      BERHANE HARGROVE MD    This note was prepared using Dragon Medical voice recognition dictation software. As a result errors may occur. When identified these errors have been corrected. While every attempt is made to correct errors during dictation discrepancies may still exist         [1]   Past Medical History:   Age-related nuclear cataract of both eyes    Arrhythmia    Contusion of left hip and thigh, initial encounter    Cough    Epistaxis    Floater, vitreous, bilateral    Global amnesia    per N minutes in [place]    Headache    High blood pressure    Hypothyroid    Inguinal hernia    Nocturia    Palpitations    toprol    Perforated ear drum    Physical exam, annual    Prostate cancer (HCC)    seed implant    Pulse irregularity   [2]   Past Surgical History:  Procedure Laterality Date    Colonoscopy      Electrocardiogram, complete  2012    scanned to media tab    Inguinal hernia repair      Inner ear surgery proc unlisted      mastoidectomy   [3]   Social History  Socioeconomic History    Marital status:    Tobacco Use    Smoking status: Never    Smokeless tobacco: Never   Vaping Use    Vaping status: Never Used   Substance and Sexual Activity    Alcohol use: No    Drug use: No   Other Topics Concern    Caffeine Concern No   [4]   Family History  Problem Relation Age of Onset    Gastro-Intestinal Disorder Father         diverticulitis    Heart Disease Mother         coronary artery disease (cause of death)    Arthritis Sister     Diabetes Maternal Grandmother     Glaucoma Neg     Macular degeneration Neg

## 2025-04-18 NOTE — CM/SW NOTE
04/18/25 1300   CM/SW Referral Data   Referral Source    Reason for Referral Discharge planning   Informant Son   Medical Hx   Does patient have an established PCP? Yes   Patient Info   Patient's Home Environment House   Number of Levels in Home 1   Number of Stair in Home 1   Patient lives with Alone   Patient Status Prior to Admission   Independent with ADLs and Mobility No   Pt. requires assistance with Ambulating  (on occassion will use cane)   Services in place prior to admission Other (comment)  (homemaking son arranged)   Discharge Needs   Anticipated D/C needs To be determined     Patient is very independent.  Drives and swims 6 days a week.    May benefit from home therapy- PT ordered.    CM starting F2F and referrals for RN and PT.  If therapy recs are for HH, will require list for choice.    Lis Kulkarni MBA BSN RN CRRN   RN Case Manager  [phone]

## 2025-04-18 NOTE — ED QUICK NOTES
Orders for admission, patient is aware of plan and ready to go upstairs. Any questions, please call ED FRAN Nieto at extension [phone].     Patient Covid vaccination status: Fully vaccinated     COVID Test Ordered in ED: None    COVID Suspicion at Admission: N/A    Running Infusions: Medication Infusions[1] None    Mental Status/LOC at time of transport: A&Ox4    Other pertinent information:   CIWA score: N/A   NIH score:  N/A             [1]

## 2025-04-18 NOTE — PLAN OF CARE
Patient admitted to the unit this morning. Admission done with patient and grandson Jayson at bedside.     Problem: Patient Centered Care  Goal: Patient preferences are identified and integrated in the patient's plan of care  Description: Interventions:- What would you like us to know as we care for you? From home alone - Provide timely, complete, and accurate information to patient/family- Incorporate patient and family knowledge, values, beliefs, and cultural backgrounds into the planning and delivery of care- Encourage patient/family to participate in care and decision-making at the level they choose- Honor patient and family perspectives and choices  Outcome: Progressing     Problem: Patient/Family Goals  Goal: Patient/Family Long Term Goal  Description: Patient's Long Term Goal: Discharge home Interventions:- IV fluids, monitor labs and vitals, follow MD orders   - See additional Care Plan goals for specific interventions  Outcome: Progressing  Goal: Patient/Family Short Term Goal  Description: Patient's Short Term Goal: Remain free from fall Interventions: -Fall precautions, use of walker   - See additional Care Plan goals for specific interventions  Outcome: Progressing     Problem: CARDIOVASCULAR - ADULT  Goal: Maintains optimal cardiac output and hemodynamic stability  Description: INTERVENTIONS:- Monitor vital signs, rhythm, and trends- Monitor for bleeding, hypotension and signs of decreased cardiac output- Evaluate effectiveness of vasoactive medications to optimize hemodynamic stability- Monitor arterial and/or venous puncture sites for bleeding and/or hematoma- Assess quality of pulses, skin color and temperature- Assess for signs of decreased coronary artery perfusion - ex. Angina- Evaluate fluid balance, assess for edema, trend weights  Outcome: Progressing  Goal: Absence of cardiac arrhythmias or at baseline  Description: INTERVENTIONS:- Continuous cardiac monitoring, monitor vital signs, obtain 12  lead EKG if indicated- Evaluate effectiveness of antiarrhythmic and heart rate control medications as ordered- Initiate emergency measures for life threatening arrhythmias- Monitor electrolytes and administer replacement therapy as ordered  Outcome: Progressing     Problem: METABOLIC/FLUID AND ELECTROLYTES - ADULT  Goal: Electrolytes maintained within normal limits  Description: INTERVENTIONS:- Monitor labs and rhythm and assess patient for signs and symptoms of electrolyte imbalances- Administer electrolyte replacement as ordered- Monitor response to electrolyte replacements, including rhythm and repeat lab results as appropriate- Fluid restriction as ordered- Instruct patient on fluid and nutrition restrictions as appropriate  Outcome: Progressing  Goal: Hemodynamic stability and optimal renal function maintained  Description: INTERVENTIONS:- Monitor labs and assess for signs and symptoms of volume excess or deficit- Monitor intake, output and patient weight- Monitor urine specific gravity, serum osmolarity and serum sodium as indicated or ordered- Monitor response to interventions for patient's volume status, including labs, urine output, blood pressure (other measures as available)- Encourage oral intake as appropriate- Instruct patient on fluid and nutrition restrictions as appropriate  Outcome: Progressing     Problem: SAFETY ADULT - FALL  Goal: Free from fall injury  Description: INTERVENTIONS:- Assess pt frequently for physical needs- Identify cognitive and physical deficits and behaviors that affect risk of falls.- [place] fall precautions as indicated by assessment.- Educate pt/family on patient safety including physical limitations- Instruct pt to call for assistance with activity based on assessment- Modify environment to reduce risk of injury- Provide assistive devices as appropriate- Consider OT/PT consult to assist with strengthening/mobility- Encourage toileting schedule  Outcome: Progressing

## 2025-04-18 NOTE — ED INITIAL ASSESSMENT (HPI)
Fell yesterday; son states he left his L arm, but denies any head trauma/LOC. Believes pt lost his footing. Pt states that today he feels weaker experiencing difficulty getting up from a seated position and issues w/ his balance. Son additionally concerned d/t pt experiencing \"shakes\" and questionable mentation - pt AxOx4 in triage. Denies any further complaint.

## 2025-04-19 ENCOUNTER — APPOINTMENT (OUTPATIENT)
Dept: ULTRASOUND IMAGING | Facility: HOSPITAL | Age: OVER 89
End: 2025-04-19
Attending: INTERNAL MEDICINE
Payer: MEDICARE

## 2025-04-19 PROBLEM — N18.30 STAGE 3 CHRONIC KIDNEY DISEASE (HCC): Status: ACTIVE | Noted: 2021-03-24

## 2025-04-19 LAB
ANION GAP SERPL CALC-SCNC: 7 MMOL/L (ref 0–18)
ATRIAL RATE: 63 BPM
BASOPHILS # BLD AUTO: 0.06 X10(3) UL (ref 0–0.2)
BASOPHILS NFR BLD AUTO: 0.6 %
BUN BLD-MCNC: 18 MG/DL (ref 9–23)
BUN/CREAT SERPL: 12.5 (ref 10–20)
CALCIUM BLD-MCNC: 13 MG/DL (ref 8.7–10.4)
CALCIUM BLD-MCNC: 13.1 MG/DL (ref 8.7–10.4)
CALCIUM BLD-MCNC: 13.6 MG/DL (ref 8.7–10.4)
CALCIUM BLD-MCNC: 14 MG/DL (ref 8.7–10.4)
CHLORIDE SERPL-SCNC: 101 MMOL/L (ref 98–112)
CO2 SERPL-SCNC: 33 MMOL/L (ref 21–32)
CREAT BLD-MCNC: 1.44 MG/DL (ref 0.7–1.3)
DEPRECATED RDW RBC AUTO: 44.4 FL (ref 35.1–46.3)
EGFRCR SERPLBLD CKD-EPI 2021: 44 ML/MIN/1.73M2 (ref 60–?)
EOSINOPHIL # BLD AUTO: 0.07 X10(3) UL (ref 0–0.7)
EOSINOPHIL NFR BLD AUTO: 0.7 %
ERYTHROCYTE [DISTWIDTH] IN BLOOD BY AUTOMATED COUNT: 14.5 % (ref 11–15)
GLUCOSE BLD-MCNC: 108 MG/DL (ref 70–99)
HCT VFR BLD AUTO: 32.3 % (ref 39–53)
HGB BLD-MCNC: 10.7 G/DL (ref 13–17.5)
IMM GRANULOCYTES # BLD AUTO: 0.21 X10(3) UL (ref 0–1)
IMM GRANULOCYTES NFR BLD: 2 %
LYMPHOCYTES # BLD AUTO: 1.55 X10(3) UL (ref 1–4)
LYMPHOCYTES NFR BLD AUTO: 15 %
MAGNESIUM SERPL-MCNC: 1.7 MG/DL (ref 1.6–2.6)
MCH RBC QN AUTO: 27.9 PG (ref 26–34)
MCHC RBC AUTO-ENTMCNC: 33.1 G/DL (ref 31–37)
MCV RBC AUTO: 84.1 FL (ref 80–100)
MONOCYTES # BLD AUTO: 1.51 X10(3) UL (ref 0.1–1)
MONOCYTES NFR BLD AUTO: 14.6 %
NEUTROPHILS # BLD AUTO: 6.91 X10 (3) UL (ref 1.5–7.7)
NEUTROPHILS # BLD AUTO: 6.91 X10(3) UL (ref 1.5–7.7)
NEUTROPHILS NFR BLD AUTO: 67.1 %
OSMOLALITY SERPL CALC.SUM OF ELEC: 294 MOSM/KG (ref 275–295)
P AXIS: 56 DEGREES
P-R INTERVAL: 192 MS
PHOSPHATE SERPL-MCNC: 3.3 MG/DL (ref 2.4–5.1)
PLATELET # BLD AUTO: 214 10(3)UL (ref 150–450)
POTASSIUM SERPL-SCNC: 3 MMOL/L (ref 3.5–5.1)
POTASSIUM SERPL-SCNC: 3.6 MMOL/L (ref 3.5–5.1)
Q-T INTERVAL: 396 MS
QRS DURATION: 92 MS
QTC CALCULATION (BEZET): 405 MS
R AXIS: -39 DEGREES
RBC # BLD AUTO: 3.84 X10(6)UL (ref 3.8–5.8)
SODIUM SERPL-SCNC: 141 MMOL/L (ref 136–145)
T AXIS: 23 DEGREES
VENTRICULAR RATE: 63 BPM
WBC # BLD AUTO: 10.3 X10(3) UL (ref 4–11)

## 2025-04-19 PROCEDURE — 99232 SBSQ HOSP IP/OBS MODERATE 35: CPT | Performed by: INTERNAL MEDICINE

## 2025-04-19 PROCEDURE — 99223 1ST HOSP IP/OBS HIGH 75: CPT | Performed by: INTERNAL MEDICINE

## 2025-04-19 PROCEDURE — 76770 US EXAM ABDO BACK WALL COMP: CPT | Performed by: INTERNAL MEDICINE

## 2025-04-19 PROCEDURE — G0316 PROLNG IP/OBS E/M EA ADDL 15 MIN: HCPCS | Performed by: INTERNAL MEDICINE

## 2025-04-19 PROCEDURE — 99233 SBSQ HOSP IP/OBS HIGH 50: CPT | Performed by: INTERNAL MEDICINE

## 2025-04-19 RX ORDER — MAGNESIUM OXIDE 400 MG/1
400 TABLET ORAL ONCE
Status: COMPLETED | OUTPATIENT
Start: 2025-04-19 | End: 2025-04-19

## 2025-04-19 RX ORDER — POTASSIUM CHLORIDE 1500 MG/1
40 TABLET, EXTENDED RELEASE ORAL EVERY 4 HOURS
Status: COMPLETED | OUTPATIENT
Start: 2025-04-19 | End: 2025-04-19

## 2025-04-19 RX ORDER — CALCITONIN SALMON 200 [USP'U]/ML
4 INJECTION, SOLUTION INTRAMUSCULAR; SUBCUTANEOUS EVERY 12 HOURS
Status: COMPLETED | OUTPATIENT
Start: 2025-04-19 | End: 2025-04-19

## 2025-04-19 NOTE — PLAN OF CARE
Pt A&Ox4, forgetful at times. Pt IVF increased overnight. Calcium trending downwards still remain elevated. Pt calls appropriately. Male manjeet applied overnight, SBA w/ walker.       Problem: Patient Centered Care  Goal: Patient preferences are identified and integrated in the patient's plan of care  Description: Interventions:- What would you like us to know as we care for you? From home alone - Provide timely, complete, and accurate information to patient/family- Incorporate patient and family knowledge, values, beliefs, and cultural backgrounds into the planning and delivery of care- Encourage patient/family to participate in care and decision-making at the level they choose- Honor patient and family perspectives and choices  Outcome: Progressing     Problem: Patient/Family Goals  Goal: Patient/Family Long Term Goal  Description: Patient's Long Term Goal: Discharge home Interventions:- IV fluids, monitor labs and vitals, follow MD orders   - See additional Care Plan goals for specific interventions  Outcome: Progressing  Goal: Patient/Family Short Term Goal  Description: Patient's Short Term Goal: Remain free from fall Interventions: -Fall precautions, use of walker   - See additional Care Plan goals for specific interventions  Outcome: Progressing     Problem: CARDIOVASCULAR - ADULT  Goal: Maintains optimal cardiac output and hemodynamic stability  Description: INTERVENTIONS:- Monitor vital signs, rhythm, and trends- Monitor for bleeding, hypotension and signs of decreased cardiac output- Evaluate effectiveness of vasoactive medications to optimize hemodynamic stability- Monitor arterial and/or venous puncture sites for bleeding and/or hematoma- Assess quality of pulses, skin color and temperature- Assess for signs of decreased coronary artery perfusion - ex. Angina- Evaluate fluid balance, assess for edema, trend weights  Outcome: Progressing  Goal: Absence of cardiac arrhythmias or at baseline  Description:  INTERVENTIONS:- Continuous cardiac monitoring, monitor vital signs, obtain 12 lead EKG if indicated- Evaluate effectiveness of antiarrhythmic and heart rate control medications as ordered- Initiate emergency measures for life threatening arrhythmias- Monitor electrolytes and administer replacement therapy as ordered  Outcome: Progressing     Problem: METABOLIC/FLUID AND ELECTROLYTES - ADULT  Goal: Electrolytes maintained within normal limits  Description: INTERVENTIONS:- Monitor labs and rhythm and assess patient for signs and symptoms of electrolyte imbalances- Administer electrolyte replacement as ordered- Monitor response to electrolyte replacements, including rhythm and repeat lab results as appropriate- Fluid restriction as ordered- Instruct patient on fluid and nutrition restrictions as appropriate  Outcome: Progressing  Goal: Hemodynamic stability and optimal renal function maintained  Description: INTERVENTIONS:- Monitor labs and assess for signs and symptoms of volume excess or deficit- Monitor intake, output and patient weight- Monitor urine specific gravity, serum osmolarity and serum sodium as indicated or ordered- Monitor response to interventions for patient's volume status, including labs, urine output, blood pressure (other measures as available)- Encourage oral intake as appropriate- Instruct patient on fluid and nutrition restrictions as appropriate  Outcome: Progressing     Problem: SAFETY ADULT - FALL  Goal: Free from fall injury  Description: INTERVENTIONS:- Assess pt frequently for physical needs- Identify cognitive and physical deficits and behaviors that affect risk of falls.- [place] fall precautions as indicated by assessment.- Educate pt/family on patient safety including physical limitations- Instruct pt to call for assistance with activity based on assessment- Modify environment to reduce risk of injury- Provide assistive devices as appropriate- Consider OT/PT consult to assist with  strengthening/mobility- Encourage toileting schedule  Outcome: Progressing

## 2025-04-19 NOTE — PROGRESS NOTES
[facility]  part of [place] [facility]ist Progress Note     Vinny Suman Smith . Patient Status:  Inpatient    1928 MRN [id number]   Location [facility]5W Attending Arnel Barlow MD   Hosp Day # 1 PCP Suman Perla MD     Chief Complaint:   Chief Complaint   Patient presents with    Fall        Subjective:     Patient seen lying in bed.  Son at bedside.  Patient denies acute events overnight.  Patient denies current chest pain, shortness of breath, abdominal pain, nausea\" fevers or chills.    Son at bedside expressed concerns about lack of response of treatment.    Objective:      Vital signs:  Vitals:    25 2339 25 0300 25 0507 25 0850   BP:   148/84 154/81   BP Location:   Right arm Right arm   Pulse: 80 82 92 82   Resp:   18 18   Temp:   98.6 °F (37 °C) 97.7 °F (36.5 °C)   TempSrc:   Oral Oral   SpO2:   91% 94%   Weight:       Height:           Intake/Output Summary (Last 24 hours) at 2025 1006  Last data filed at 2025 0902  Gross per 24 hour   Intake 2890 ml   Output 3320 ml   Net -430 ml           Physical Exam:    GENERAL:  Awake and alert, in no acute distress.  HEART:  Regular rhythm, regular rate  LUNGS:  Air entry was minimally decreased.  No increased work of breathing or wheezes   ABDOMEN: Soft and non-tender.    PSYCHIATRIC: Normal mood    Diagnostic Data:    Labs:    Recent Labs   Lab 25  0728 25  0504   WBC 9.9 10.3   HGB 9.8* 10.7*   MCV 83.7 84.1   .0 214.0       Recent Labs   Lab 25  0728 25  0916 25  1759 25  0504   *  --   --  108*   BUN 26*  --   --  18   CREATSERUM 1.64*  --   --  1.44*   CA 13.6* 13.6*  13.9* 13.5* 14.0*   ALB 3.9  --   --   --      --   --  141   K 3.9  --   --  3.0*     --   --  101   CO2 30.0  --   --  33.0*   ALKPHO 111  --   --   --    AST 43*  --   --   --    ALT 26  --   --   --    BILT 0.4  --   --   --    TP 6.8  --   --    --            Estimated Creatinine Clearance: 31 mL/min (A) (based on SCr of 1.44 mg/dL (H)).    No results for input(s): \"PTP\", \"INR\" in the last 168 hours.         COVID-19  Lab Results   Component Value Date    COVID19 Not Detected 09/01/2022    COVID19 Detected (A) 06/18/2022    COVID19 Detected (A) 04/12/2022       Pro-Calcitonin  No results for input(s): \"PCT\" in the last 168 hours.    Cardiac  No results for input(s): \"TROP\", \"PBNP\" in the last 168 hours.    Inflammatory Markers  No results for input(s): \"CRP\", \"WILIAN\", \"LDH\", \"DDIMER\" in the last 168 hours.    Culture:  No results found for this visit on 04/18/25.    CT CHEST+ABDOMEN+PELVIS(CPT=71250/18572)  Result Date: 4/18/2025  CONCLUSION:   CT CHEST:  1. Ectatic ascending aorta measuring up to 4.5 cm in diameter, which has increased in size when compared to 06/11/2007 when it measured 4.1 cm in diameter. 2. Moderate coronary artery calcifications. 3. Stable benign pulmonary nodules measuring up to 3 mm.  No new or enlarging pulmonary nodule. 4. Scattered peribronchial thickening. 5. Lesser incidental findings described above.  CT ABDOMEN AND PELVIS:  1. Dilated common bile duct measuring up to 15 mm in diameter.  Consider further evaluation with an MRI/MRCP as well as correlation with liver function tests. 2. Distended gallbladder without secondary findings to suggest acute cholecystitis. 3. Indeterminate 9 mm posterior right midpole renal lesion.  Consider further evaluation with a renal ultrasound. 4. Linear metallic radiodensities in the prostate, which likely reflect brachytherapy seeds. 5. Lesser incidental findings described above.    Dictated by (CST): Corey Carter MD on 4/18/2025 at 5:29 PM     Finalized by (CST): Corey Carter MD on 4/18/2025 at 5:42 PM            EKG 12 Lead  Result Date: 4/19/2025  Normal sinus rhythm Left axis deviation Incomplete right bundle branch block Abnormal ECG No previous ECGs found in [facility] Confirmed by SHANENN  LASHA ([id number]) on 4/19/2025 9:14:33 AM      Medications: Scheduled Medications[1]    Assessment & Plan:        Hypercalcemia  -Unclear etiology  -calcium 13.6--> 14  -pth, appropriately low  -vit D is normal   -pthrp spep and imaging ordered  - Repeat calcitonin given  - Continue ivf  -Initial concerns for kidney function and tolerance of zoledronic acid, cleared to give per nephrology.  -CT chest ab pelvis reviewed.  Noted stable, benign pulmonary nodules.  Also noted indeterminate right renal lesion.  - Further evaluation of renal lesion with renal ultrasound.  - Continue to monitor    Paroxysmal Atrial Fibrillation  -Rates currently controlled  -Continue diltiazem for rate control  -Continue chronic anticoagulation with Xarelto  -Telemetry monitoring    HTN  - controlled  - CPM  - Monitor and adjust as needed     HL  -cont lipitor       Plan of care discussed with patient and son at bedside . Discussed management/test result(s) with Rn, endocrinology and nephrology consultants.    ADDENDUM: Revist. D/w pt's Daughter, granddaughter and son at bedside.  Discussed findings on CT and renal ultrasound.  Offered consultation with urology for further evaluation of potential renal cell carcinoma.  Patient considering options as they report patient would not like to proceed with invasive treatment.  Stated they would like to proceed with medical treatment at this time.  Will follow-up regarding decision for further consultations.    Quality:  DVT Prophylaxis: Xarelto  CODE status: DNR/select  Estimated date of discharge: TBD  Discharge is dependent on: clinical stability    85 minutes spent discussing with other providers, examining patient, obtaining history, reviewing medical records, interpreting and communicating test results/imaging, ordering tests/medications, discussing plan of care and documenting information.      Arnel Barlow MD          This note was prepared using Dragon Medical voice recognition dictation  software. As a result errors may occur. When identified these errors have been corrected. While every attempt is made to correct errors during dictation discrepancies may still exist         [1]    potassium chloride  40 mEq Oral Q4H    calcitonin  4 Units/kg Subcutaneous Q12H    zoledronic acid (Zometa) 4 mg in sodium chloride 0.9% 105 mL IVPB  4 mg Intravenous Once    amLODIPine  5 mg Oral Daily    atorvastatin  10 mg Oral Daily    dilTIAZem ER  120 mg Oral Daily    levothyroxine  100 mcg Oral Before breakfast    rivaroxaban  15 mg Oral Daily with food    pantoprazole  40 mg Oral Before breakfast

## 2025-04-19 NOTE — PROGRESS NOTES
[facility]  part of [facility]    Progress Note    Vinny Smith Jr. Patient Status:  Inpatient    1928 MRN [id number]   Location [facility]5W Attending Arnel Barlow MD   Hosp Day # 1 PCP Suman Perla MD     Subjective:  Doing okay     On IVF  Calcium continues to go up     A comprehensive 10 point review of systems was completed.  Pertinent positives and negatives noted in the the HPI.      Objective/Physical Exam:  Physical Exam:   Vital Signs:  Blood pressure 148/84, pulse 92, temperature 98.6 °F (37 °C), temperature source Oral, resp. rate 18, height 5' 11\" (1.803 m), weight 165 lb (74.8 kg), SpO2 91%.  General Appearance:  alert, well developed, in no acute distress  Head: Atraumatic  Eyes:  normal conjunctivae, sclera., normal sclera and normal pupils  Throat/Neck: normal sound to voice. Normal hearing, normal speech  Respiratory:  Speaking in full sentences, non-labored. no increased work of breathing, no audible wheezing    Neuro: motor grossly intact, moving all extremities without difficulty  Psychiatric:  oriented to time, self, and place  Extremities: no obvious extremity swelling    Labs:  Pertinent data reviewed     Assessment/Plan:  Problem List[1]    Hypercalcemia  PTH is appropriately low. Vitamin D is normal   Hypercalcemia is related to non-PTH causes  Vit 1,25 [place] , PTHrp, SPEP  ordered     Patient is on IV fluids  Will given calcitonin 1 dose this morning  Recommend calcium monitoring every 4 hours   Should ideally get zometa/ pamidronate --> CI per order set given renal function   Recommend nephrology consult       Plan discussed in detail with patient and patient's son ( spoke with the son over the phone)   We will follow       Mariella Benavidez MD         [1]   Patient Active Problem List  Diagnosis    Hypothyroidism    Hearing loss    Mixed hyperlipidemia    Primary hypertension    Atrial fibrillation (HCC)    Stage 3a chronic kidney disease  (HCC)    Atherosclerosis of aorta    Other emphysema (HCC)    Age-related nuclear cataract of both eyes    Floater, vitreous, bilateral    Intracranial hemorrhage (HCC)    Osteoarthritis of multiple joints    History of prostate cancer    Abdominal pain    Acute prostatitis    Arthropathy    Contusion of face, scalp, and neck    Dysphonia    Elevated prostate specific antigen (PSA)    Hematuria    Hyperplasia of prostate without lower urinary tract symptoms (LUTS)    Impacted cerumen    Microscopic hematuria    Rotator cuff syndrome of right shoulder    Urinary frequency    Hypercalcemia

## 2025-04-19 NOTE — CONSULTS
[facility]  part of [facility]    Report of Consultation    Vinny Smith Jr. Patient Status:  Inpatient    1928 MRN [id number]   Location [facility]5W Attending Arnel Barlow MD   Hosp Day # 1 PCP Suman Perla MD     Date of Admission:  2025  Date of Consult:  2025   Reason for Consultation:   Hypercalcemia/ CKD    History of Present Illness:   Patient is a 89 year old male who was admitted to the hospital for Hypercalcemia:    89 y/o M with h/o hypertension, A-fib, CKD 3  ( stable cr)  history of prostate cancer and very active individual   living independently presented to ER after a fall in his garage on .  Per patient he has been feeling slightly weaker than his usual self.  In ER he was noted to be hypercalcemic at at 13.9.  He denies vitamin intake but does use Tums occasionally.  He was seen by endocrinology and started on IV fluids and calcitonin and we are consulted for bisphosphonate management.  Hypercalcemia workup has also been sent and CT chest abdomen pelvis been done.    Past Medical History  Past Medical History[1]    Past Surgical History  Past Surgical History[2]    Family History  Family History[3]    Social History  Short Social Hx on File[4]    Current Medications:  Current Hospital Medications[5]  Prescriptions Prior to Admission[6]    Allergies  Allergies[7]    Review of Systems:   GENERAL HEALTH: feels well otherwise  SKIN: denies any unusual skin lesions or rashes  RESPIRATORY: denies shortness of breath with exertion, no cough, no hemoptysis  CARDIOVASCULAR: denies chest pain on exertion, no palpitations  :  Denies hematuria, dysuria, foamy urine  GI: denies abdominal pain and denies heartburn, no nausea/vomiting/diarrhea  EXT: no edema  NEURO: denies headaches      A comprehensive 12 point review of systems was completed.  Pertinent positives as above and all the rest were negative.     Physical Exam:   /81 (BP  Location: Right arm)   Pulse 82   Temp 97.7 °F (36.5 °C) (Oral)   Resp 18   Ht 5' 11\" (1.803 m)   Wt 165 lb (74.8 kg)   SpO2 94%   BMI 23.01 kg/m²      Intake/Output Summary (Last 24 hours) at 4/19/2025 0945  Last data filed at 4/19/2025 0902  Gross per 24 hour   Intake 2890 ml   Output 3320 ml   Net -430 ml     Wt Readings from Last 1 Encounters:   04/18/25 165 lb (74.8 kg)       Exam  GENERAL: well developed, well nourished,in no apparent distress  SKIN: no rashes  HEENT: no scleral icterus, moist mucus membranes  NECK: supple,no adenopathy,no bruits  LUNGS: clear to auscultation without crackles or wheezes  CARDIO: RRR without murmur  GI: good BS's,no masses, HSM or tenderness, soft, non-distended, no audible bruits  EXTREMITIES: no cyanosis, clubbing or edema  NEURO: CN grossly intact, A+O x3  Access      Results:     Laboratory Data:  Recent Labs   Lab 04/18/25  0728 04/19/25  0504   RBC 3.61* 3.84   HGB 9.8* 10.7*   HCT 30.2* 32.3*   MCV 83.7 84.1   MCH 27.1 27.9   MCHC 32.5 33.1   RDW 14.7 14.5   NEPRELIM 7.62 6.91   WBC 9.9 10.3   .0 214.0         Recent Labs   Lab 04/18/25  0728 04/18/25  0916 04/18/25  1759 04/19/25  0504   *  --   --  108*   BUN 26*  --   --  18   CREATSERUM 1.64*  --   --  1.44*   CA 13.6* 13.6*  13.9* 13.5* 14.0*     --   --  141   K 3.9  --   --  3.0*     --   --  101   CO2 30.0  --   --  33.0*        Imaging:  CT CHEST+ABDOMEN+PELVIS(CPT=71250/31352)  Result Date: 4/18/2025  CONCLUSION:   CT CHEST:  1. Ectatic ascending aorta measuring up to 4.5 cm in diameter, which has increased in size when compared to 06/11/2007 when it measured 4.1 cm in diameter. 2. Moderate coronary artery calcifications. 3. Stable benign pulmonary nodules measuring up to 3 mm.  No new or enlarging pulmonary nodule. 4. Scattered peribronchial thickening. 5. Lesser incidental findings described above.  CT ABDOMEN AND PELVIS:  1. Dilated common bile duct measuring up to 15 mm in  diameter.  Consider further evaluation with an MRI/MRCP as well as correlation with liver function tests. 2. Distended gallbladder without secondary findings to suggest acute cholecystitis. 3. Indeterminate 9 mm posterior right midpole renal lesion.  Consider further evaluation with a renal ultrasound. 4. Linear metallic radiodensities in the prostate, which likely reflect brachytherapy seeds. 5. Lesser incidental findings described above.    Dictated by (CST): Corey Carter MD on 2025 at 5:29 PM     Finalized by (CST): Corey Carter MD on 2025 at 5:42 PM                Impression/Plan:     Impression:  Hypercalcemia, non intact PTH mediated ( low appropriately) .  Workup reveals normal vitamin D.  PTH RP and SPEP is pending.  Agree with fluids and calcitonin and will give a dose of zoledronic acid  CKD 3. B/l cr 1.5 mg/dl. UA with rbc  Hypertension- on amlodipine and dialtiazem  A-fib= on xarelto  Right renal lesion on CT and will get renal ultrasound      Plan:  IVF  Calcitonin  IV Zoledronic acid 4  mg  x1   Labs in am  fu hypercalcemia blue  Renal US    Addendum 1;42 pm  Called and update son    Thank you very much for allowing me to participate in the care of your patient.  If you have any questions, please do not hesitate to contact me.     Mag Valdez MD  2025         [1]   Past Medical History:   Age-related nuclear cataract of both eyes    Arrhythmia    Contusion of left hip and thigh, initial encounter    Cough    Epistaxis    Floater, vitreous, bilateral    Global amnesia    per N minutes in [place]    Headache    High blood pressure    Hypothyroid    Inguinal hernia    Nocturia    Palpitations    toprol    Perforated ear drum    Physical exam, annual    Prostate cancer (HCC)    seed implant    Pulse irregularity   [2]   Past Surgical History:  Procedure Laterality Date    Colonoscopy      Electrocardiogram, complete  2012    scanned to media tab    Inguinal hernia repair       Inner ear surgery proc unlisted      mastoidectomy   [3]   Family History  Problem Relation Age of Onset    Gastro-Intestinal Disorder Father         diverticulitis    Heart Disease Mother         coronary artery disease (cause of death)    Arthritis Sister     Diabetes Maternal Grandmother     Glaucoma Neg     Macular degeneration Neg    [4]   Social History  Socioeconomic History    Marital status:    Tobacco Use    Smoking status: Never    Smokeless tobacco: Never   Vaping Use    Vaping status: Never Used   Substance and Sexual Activity    Alcohol use: No    Drug use: No   Other Topics Concern    Caffeine Concern No     Social Drivers of Health     Food Insecurity: No Food Insecurity (4/18/2025)    NCSS - Food Insecurity     Worried About Running Out of Food in the Last Year: No     Ran Out of Food in the Last Year: No   Transportation Needs: No Transportation Needs (4/18/2025)    NCSS - Transportation     Lack of Transportation: No   Housing Stability: Not At Risk (4/18/2025)    NCSS - Housing/Utilities     Has Housing: Yes     Worried About Losing Housing: No     Unable to Get Utilities: No   [5]   Current Facility-Administered Medications   Medication Dose Route Frequency    potassium chloride (Klor-Con M20) tab 40 mEq  40 mEq Oral Q4H    calcitonin (Miacalcin) 200 Units/mL injection 300 Units  4 Units/kg Subcutaneous Q12H    zoledronic acid (Zometa) 4 mg in sodium chloride 0.9% 105 mL IVPB  4 mg Intravenous Once    amLODIPine (Norvasc) tab 5 mg  5 mg Oral Daily    atorvastatin (Lipitor) tab 10 mg  10 mg Oral Daily    dilTIAZem ER (Dilacor XR) 24 hr cap 120 mg  120 mg Oral Daily    levothyroxine (Synthroid) tab 100 mcg  100 mcg Oral Before breakfast    rivaroxaban (Xarelto) tab 15 mg  15 mg Oral Daily with food    pantoprazole (Protonix) DR tab 40 mg  40 mg Oral Before breakfast    sodium chloride 0.9% infusion   Intravenous Continuous    acetaminophen (Tylenol) tab 650 mg  650 mg Oral Q4H PRN    Or     HYDROcodone-acetaminophen (Norco) 5-325 MG per tab 1 tablet  1 tablet Oral Q4H PRN    Or    HYDROcodone-acetaminophen (Norco) 5-325 MG per tab 2 tablet  2 tablet Oral Q4H PRN    morphINE PF 2 MG/ML injection 1 mg  1 mg Intravenous Q2H PRN    Or    morphINE PF 2 MG/ML injection 2 mg  2 mg Intravenous Q2H PRN    Or    morphINE PF 4 MG/ML injection 4 mg  4 mg Intravenous Q2H PRN    polyethylene glycol (PEG 3350) (Miralax) 17 g oral packet 17 g  17 g Oral Daily PRN    sennosides (Senokot) tab 17.2 mg  17.2 mg Oral Nightly PRN    bisacodyl (Dulcolax) 10 MG rectal suppository 10 mg  10 mg Rectal Daily PRN    ondansetron (Zofran) 4 MG/2ML injection 4 mg  4 mg Intravenous Q6H PRN    metoclopramide (Reglan) 5 mg/mL injection 5 mg  5 mg Intravenous Q8H PRN   [6]   Medications Prior to Admission   Medication Sig    levothyroxine 100 MCG Oral Tab Take 1 tablet (100 mcg total) by mouth before breakfast.    ATORVASTATIN 10 MG Oral Tab TAKE 1 TABLET BY MOUTH EVERY DAY    PANTOPRAZOLE 40 MG Oral Tab EC TAKE 1 TABLET BY MOUTH BEFORE BREAKFAST    amLODIPine 5 MG Oral Tab Take 1 tablet (5 mg total) by mouth in the morning.    rivaroxaban 15 MG Oral Tab Take 1 tablet (15 mg total) by mouth daily with food.    Multiple Vitamins-Minerals (CENTRUM SILVER) Oral Tab Take 1 tablet by mouth in the morning.    DilTIAZem HCl ER Coated Beads 120 MG Oral Capsule SR 24 Hr Take 1 capsule (120 mg total) by mouth daily.    amoxicillin 500 MG Oral Cap TAKE 1 CAPSULE BY MOUTH THREE TIMES A DAY UNTIL GONE (Patient not taking: Reported on 3/3/2025)    Imiquimod 5 % External Cream    [7]   Allergies  Allergen Reactions    Paxil [Paroxetine] RASH    Sulfacetamide      Other reaction(s): SULFA (SULFONAMIDE ANTIBIOTICS)

## 2025-04-19 NOTE — PROGRESS NOTES
PT orders received chart reviewed> PT spoke with RN who recommended holding PT evaluation today d/t elevated CA levels and weakness. PT will continue to follow a re attempt 4/20/25 as medical progress allows.

## 2025-04-19 NOTE — PLAN OF CARE
Problem: Patient Centered Care  Goal: Patient preferences are identified and integrated in the patient's plan of care  Description: Interventions:- What would you like us to know as we care for you? From home alone - Provide timely, complete, and accurate information to patient/family- Incorporate patient and family knowledge, values, beliefs, and cultural backgrounds into the planning and delivery of care- Encourage patient/family to participate in care and decision-making at the level they choose- Honor patient and family perspectives and choices  Outcome: Progressing     Problem: CARDIOVASCULAR - ADULT  Goal: Maintains optimal cardiac output and hemodynamic stability  Description: INTERVENTIONS:- Monitor vital signs, rhythm, and trends- Monitor for bleeding, hypotension and signs of decreased cardiac output- Evaluate effectiveness of vasoactive medications to optimize hemodynamic stability- Monitor arterial and/or venous puncture sites for bleeding and/or hematoma- Assess quality of pulses, skin color and temperature- Assess for signs of decreased coronary artery perfusion - ex. Angina- Evaluate fluid balance, assess for edema, trend weights  Outcome: Progressing  Goal: Absence of cardiac arrhythmias or at baseline  Description: INTERVENTIONS:- Continuous cardiac monitoring, monitor vital signs, obtain 12 lead EKG if indicated- Evaluate effectiveness of antiarrhythmic and heart rate control medications as ordered- Initiate emergency measures for life threatening arrhythmias- Monitor electrolytes and administer replacement therapy as ordered  Outcome: Progressing     Problem: METABOLIC/FLUID AND ELECTROLYTES - ADULT  Goal: Electrolytes maintained within normal limits  Description: INTERVENTIONS:- Monitor labs and rhythm and assess patient for signs and symptoms of electrolyte imbalances- Administer electrolyte replacement as ordered- Monitor response to electrolyte replacements, including rhythm and repeat lab  results as appropriate- Fluid restriction as ordered- Instruct patient on fluid and nutrition restrictions as appropriate  Outcome: Progressing  Goal: Hemodynamic stability and optimal renal function maintained  Description: INTERVENTIONS:- Monitor labs and assess for signs and symptoms of volume excess or deficit- Monitor intake, output and patient weight- Monitor urine specific gravity, serum osmolarity and serum sodium as indicated or ordered- Monitor response to interventions for patient's volume status, including labs, urine output, blood pressure (other measures as available)- Encourage oral intake as appropriate- Instruct patient on fluid and nutrition restrictions as appropriate  Outcome: Progressing     Problem: SAFETY ADULT - FALL  Goal: Free from fall injury  Description: INTERVENTIONS:- Assess pt frequently for physical needs- Identify cognitive and physical deficits and behaviors that affect risk of falls.- [place] fall precautions as indicated by assessment.- Educate pt/family on patient safety including physical limitations- Instruct pt to call for assistance with activity based on assessment- Modify environment to reduce risk of injury- Provide assistive devices as appropriate- Consider OT/PT consult to assist with strengthening/mobility- Encourage toileting schedule  Outcome: Progressing

## 2025-04-20 ENCOUNTER — TELEPHONE (OUTPATIENT)
Dept: ENDOCRINOLOGY CLINIC | Facility: CLINIC | Age: OVER 89
End: 2025-04-20

## 2025-04-20 ENCOUNTER — APPOINTMENT (OUTPATIENT)
Dept: CT IMAGING | Facility: HOSPITAL | Age: OVER 89
End: 2025-04-20
Attending: STUDENT IN AN ORGANIZED HEALTH CARE EDUCATION/TRAINING PROGRAM
Payer: MEDICARE

## 2025-04-20 PROBLEM — N17.9 AKI (ACUTE KIDNEY INJURY): Status: ACTIVE | Noted: 2025-01-01

## 2025-04-20 PROBLEM — S06.350A: Status: ACTIVE | Noted: 2025-01-01

## 2025-04-20 PROBLEM — S06.350A: Status: ACTIVE | Noted: 2025-04-20

## 2025-04-20 PROBLEM — N17.9 AKI (ACUTE KIDNEY INJURY): Status: ACTIVE | Noted: 2025-04-20

## 2025-04-20 LAB
ANION GAP SERPL CALC-SCNC: 7 MMOL/L (ref 0–18)
BASE EXCESS BLD CALC-SCNC: 6.3 MMOL/L (ref ?–2)
BUN BLD-MCNC: 27 MG/DL (ref 9–23)
BUN/CREAT SERPL: 13.9 (ref 10–20)
CALCIUM BLD-MCNC: 11.6 MG/DL (ref 8.7–10.4)
CALCIUM BLD-MCNC: 11.9 MG/DL (ref 8.7–10.4)
CALCIUM BLD-MCNC: 12.3 MG/DL (ref 8.7–10.4)
CALCIUM BLD-MCNC: 12.6 MG/DL (ref 8.7–10.4)
CALCIUM BLD-MCNC: 12.6 MG/DL (ref 8.7–10.4)
CALCIUM BLD-MCNC: 12.7 MG/DL (ref 8.7–10.4)
CALCIUM BLD-MCNC: 13.1 MG/DL (ref 8.7–10.4)
CHLORIDE SERPL-SCNC: 107 MMOL/L (ref 98–112)
CO2 SERPL-SCNC: 29 MMOL/L (ref 21–32)
CREAT BLD-MCNC: 1.94 MG/DL (ref 0.7–1.3)
EGFRCR SERPLBLD CKD-EPI 2021: 31 ML/MIN/1.73M2 (ref 60–?)
GLUCOSE BLD-MCNC: 141 MG/DL (ref 70–99)
HCO3 BLDA-SCNC: 29.8 MEQ/L (ref 21–27)
IGA SERPL-MCNC: 99.2 MG/DL (ref 40–350)
IGM SERPL-MCNC: 1019.2 MG/DL (ref 50–300)
IMMUNOGLOBULIN PNL SER-MCNC: 679 MG/DL (ref 650–1600)
MAGNESIUM SERPL-MCNC: 1.8 MG/DL (ref 1.6–2.6)
MODIFIED ALLEN TEST: POSITIVE
O2 CT BLD-SCNC: 14.6 VOL% (ref 15–23)
OSMOLALITY SERPL CALC.SUM OF ELEC: 303 MOSM/KG (ref 275–295)
PCO2 BLDA: 35 MM HG (ref 35–45)
PH BLDA: 7.53 [PH] (ref 7.35–7.45)
PO2 BLDA: 83 MM HG (ref 80–100)
POTASSIUM SERPL-SCNC: 4.3 MMOL/L (ref 3.5–5.1)
POTASSIUM SERPL-SCNC: 4.4 MMOL/L (ref 3.5–5.1)
PUNCTURE CHARGE: YES
SAO2 % BLDA: 98.5 % (ref 94–100)
SODIUM SERPL-SCNC: 143 MMOL/L (ref 136–145)

## 2025-04-20 PROCEDURE — 99232 SBSQ HOSP IP/OBS MODERATE 35: CPT | Performed by: INTERNAL MEDICINE

## 2025-04-20 PROCEDURE — 99223 1ST HOSP IP/OBS HIGH 75: CPT | Performed by: NEUROLOGICAL SURGERY

## 2025-04-20 PROCEDURE — 99233 SBSQ HOSP IP/OBS HIGH 50: CPT | Performed by: INTERNAL MEDICINE

## 2025-04-20 PROCEDURE — 99233 SBSQ HOSP IP/OBS HIGH 50: CPT | Performed by: HOSPITALIST

## 2025-04-20 PROCEDURE — 70450 CT HEAD/BRAIN W/O DYE: CPT | Performed by: STUDENT IN AN ORGANIZED HEALTH CARE EDUCATION/TRAINING PROGRAM

## 2025-04-20 RX ORDER — MAGNESIUM OXIDE 400 MG/1
400 TABLET ORAL ONCE
Status: COMPLETED | OUTPATIENT
Start: 2025-04-20 | End: 2025-04-20

## 2025-04-20 NOTE — PROGRESS NOTES
[facility]  part of [facility]    Progress Note    Vinny Smith Jr. Patient Status:  Inpatient    1928 MRN [id number]   Location [facility]5W Attending Arnel Barlow MD   Hosp Day # 2 PCP Suman Perla MD     Subjective:  Gets confused intermittently    ROS not possible due to the patient's medical condition      Objective/Physical Exam:  Physical Exam:   Vital Signs:  Blood pressure 156/77, pulse 81, temperature 99.2 °F (37.3 °C), temperature source Oral, resp. rate 16, height 5' 11\" (1.803 m), weight 165 lb (74.8 kg), SpO2 95%.  General Appearance:  sleepy   Head: Atraumatic  Eyes:  normal conjunctivae, sclera., normal sclera and normal pupils  Throat/Neck: normal sound to voice. Normal hearing, normal speech  Respiratory:  no increased work of breathing, no audible wheezing    Neuro: motor grossly intact, moving all extremities without difficulty  Extremities: no obvious extremity swelling    Labs:  Pertinent data reviewed     Assessment/Plan:  Problem List[1]    Hypercalcemia  PTH is appropriately low. Vitamin D is normal   Hypercalcemia is related to non-PTH causes  Vit 1,25 [place] , PTHrp, SPEP  ordered     Patient is on IV fluids  He is status post calcitonin 1-2 doses  He has also received 25  Calcium levels a little better   recommend monitoring calcium every 12 hours    We will follow       When patient is medically cleared for discharge recommend he follow-up with his PCP in 1 week.  Recommend monitoring calcium in 1 week.    Mariella Benavidez MD         [1]   Patient Active Problem List  Diagnosis    Hypothyroidism    Hearing loss    Mixed hyperlipidemia    Primary hypertension    Atrial fibrillation (HCC)    Stage 3 chronic kidney disease (HCC)    Atherosclerosis of aorta    Other emphysema (HCC)    Age-related nuclear cataract of both eyes    Floater, vitreous, bilateral    Intracranial hemorrhage (HCC)    Osteoarthritis of multiple joints    History  of prostate cancer    Abdominal pain    Acute prostatitis    Arthropathy    Contusion of face, scalp, and neck    Dysphonia    Elevated prostate specific antigen (PSA)    Hematuria    Hyperplasia of prostate without lower urinary tract symptoms (LUTS)    Impacted cerumen    Microscopic hematuria    Rotator cuff syndrome of right shoulder    Urinary frequency    Hypercalcemia    LUH (acute kidney injury)    Traumatic left-sided intracerebral hemorrhage without loss of consciousness (HCC)

## 2025-04-20 NOTE — TELEPHONE ENCOUNTER
Hello, patient was seen in the hospital for hypercalcemia.  He has received Zometa on 4/19/2025 and also received calcitonin during hospitalization.  Calcium is coming down.  Calcium is likely related to non-PTH causes.  I think he will benefit for follow-up with you in the next 7 to 10 days.  If your office can please contact him, I will appreciate it.  Thanks.

## 2025-04-20 NOTE — PLAN OF CARE
Problem: Patient Centered Care  Goal: Patient preferences are identified and integrated in the patient's plan of care  Description: Interventions:- What would you like us to know as we care for you? From home alone - Provide timely, complete, and accurate information to patient/family- Incorporate patient and family knowledge, values, beliefs, and cultural backgrounds into the planning and delivery of care- Encourage patient/family to participate in care and decision-making at the level they choose- Honor patient and family perspectives and choices  Outcome: Progressing     Problem: Patient/Family Goals  Goal: Patient/Family Long Term Goal  Description: Patient's Long Term Goal: Discharge home Interventions:- IV fluids, monitor labs and vitals, follow MD orders   - See additional Care Plan goals for specific interventions  Outcome: Progressing  Goal: Patient/Family Short Term Goal  Description: Patient's Short Term Goal: Remain free from fall Interventions: -Fall precautions, use of walker   - See additional Care Plan goals for specific interventions  Outcome: Progressing     Problem: CARDIOVASCULAR - ADULT  Goal: Maintains optimal cardiac output and hemodynamic stability  Description: INTERVENTIONS:- Monitor vital signs, rhythm, and trends- Monitor for bleeding, hypotension and signs of decreased cardiac output- Evaluate effectiveness of vasoactive medications to optimize hemodynamic stability- Monitor arterial and/or venous puncture sites for bleeding and/or hematoma- Assess quality of pulses, skin color and temperature- Assess for signs of decreased coronary artery perfusion - ex. Angina- Evaluate fluid balance, assess for edema, trend weights  Outcome: Progressing  Goal: Absence of cardiac arrhythmias or at baseline  Description: INTERVENTIONS:- Continuous cardiac monitoring, monitor vital signs, obtain 12 lead EKG if indicated- Evaluate effectiveness of antiarrhythmic and heart rate control medications as  ordered- Initiate emergency measures for life threatening arrhythmias- Monitor electrolytes and administer replacement therapy as ordered  Outcome: Progressing     Problem: METABOLIC/FLUID AND ELECTROLYTES - ADULT  Goal: Electrolytes maintained within normal limits  Description: INTERVENTIONS:- Monitor labs and rhythm and assess patient for signs and symptoms of electrolyte imbalances- Administer electrolyte replacement as ordered- Monitor response to electrolyte replacements, including rhythm and repeat lab results as appropriate- Fluid restriction as ordered- Instruct patient on fluid and nutrition restrictions as appropriate  Outcome: Progressing  Goal: Hemodynamic stability and optimal renal function maintained  Description: INTERVENTIONS:- Monitor labs and assess for signs and symptoms of volume excess or deficit- Monitor intake, output and patient weight- Monitor urine specific gravity, serum osmolarity and serum sodium as indicated or ordered- Monitor response to interventions for patient's volume status, including labs, urine output, blood pressure (other measures as available)- Encourage oral intake as appropriate- Instruct patient on fluid and nutrition restrictions as appropriate  Outcome: Progressing     Problem: SAFETY ADULT - FALL  Goal: Free from fall injury  Description: INTERVENTIONS:- Assess pt frequently for physical needs- Identify cognitive and physical deficits and behaviors that affect risk of falls.- [place] fall precautions as indicated by assessment.- Educate pt/family on patient safety including physical limitations- Instruct pt to call for assistance with activity based on assessment- Modify environment to reduce risk of injury- Provide assistive devices as appropriate- Consider OT/PT consult to assist with strengthening/mobility- Encourage toileting schedule  Outcome: Progressing

## 2025-04-20 NOTE — PROGRESS NOTES
[facility]  part of [facility]    Progress Note    Vinny Smith Jr. Patient Status:  Inpatient    1928 MRN [id number]   Location [facility]5W Attending Mark Varner MD   Hosp Day # 2 PCP Suman Perla MD       Subjective:   Vinny Smith Jr. is a(n) 89 year old male who I am seeing for hypercalcemia/ LUH/CKD    Confused  Had a fall earlier today  Son at bedside      Objective:   /79 (BP Location: Right arm)   Pulse 78   Temp 97.9 °F (36.6 °C) (Oral)   Resp 18   Ht 5' 11\" (1.803 m)   Wt 165 lb (74.8 kg)   SpO2 92%   BMI 23.01 kg/m²      Intake/Output Summary (Last 24 hours) at 2025 1129  Last data filed at 2025 0700  Gross per 24 hour   Intake 2961.25 ml   Output 2450 ml   Net 511.25 ml     Wt Readings from Last 1 Encounters:   25 165 lb (74.8 kg)       Exam  Vital signs: Blood pressure 149/79, pulse 78, temperature 97.9 °F (36.6 °C), temperature source Oral, resp. rate 18, height 5' 11\" (1.803 m), weight 165 lb (74.8 kg), SpO2 92%.    General: No acute distress.   HEENT: Moist mucous membranes. EOMI. PERRLA  Neck:  No JVD.   Respiratory: Clear to auscultation bilaterally.    Cardiovascular: S1, S2.  Regular rate and rhythm.   Abdomen: Soft, nontender, nondistended.    Neurologic: No focal neurological deficits.  Musculoskeletal: Full range of motion of all extremities.  No swelling noted.  Access:    Results:     Recent Labs   Lab 25  0728 25  0504   RBC 3.61* 3.84   HGB 9.8* 10.7*   HCT 30.2* 32.3*   MCV 83.7 84.1   MCH 27.1 27.9   MCHC 32.5 33.1   RDW 14.7 14.5   NEPRELIM 7.62 6.91   WBC 9.9 10.3   .0 214.0         Recent Labs   Lab 25  0728 25  0916 25  0504 25  1418 25  1800 25  2332 25  0316 25  0743   *  --  108*  --   --   --  141*  --    BUN 26*  --  18  --   --   --  27*  --    CREATSERUM 1.64*  --  1.44*  --   --   --  1.94*  --    CA 13.6*   < > 14.0*  13.6*   < > 13.1* 12.6*  12.6* 12.7*     --  141  --   --   --  143  --    K 3.9  --  3.0* 3.6  --  4.3 4.4  --      --  101  --   --   --  107  --    CO2 30.0  --  33.0*  --   --   --  29.0  --     < > = values in this interval not displayed.          CT BRAIN OR HEAD (CPT=70450)  Result Date: 4/20/2025  CONCLUSION:  1. Small 4 mm high density focus in the left cerebellum is new since comparison head CT from June, 2022. This is concerning for a small left cerebellar hematoma (could relate to recent trauma or a hemorrhagic lacunar infarct) or less likely interval development of a left cerebellar calcification.  No associated mass effect or midline shift.  Suggest continued head CT surveillance to ensure stability/resolution.  This finding was not described in the preliminary report and a message regarding discrepancy was sent to Dr. Copeland (covering for Dr. Weinstein) via perfect serve at the time of dictation. 2. Nonspecific white matter changes involving both cerebral hemispheres that most likely reflect sequelae of chronic microangiopathy. 3. Intracranial atherosclerosis. 4. Nonspecific subtotal opacification of the right mastoid air cells.   A preliminary report was issued by the [facility] teleradiology service. There are no major discrepancies.  elm-remote  Dictated by (CST): Randy Yao MD on 4/20/2025 at 6:58 AM     Finalized by (CST): Randy Yao MD on 4/20/2025 at 7:06 AM          CT CHEST+ABDOMEN+PELVIS(CPT=71250/58324)  Result Date: 4/18/2025  CONCLUSION:   CT CHEST:  1. Ectatic ascending aorta measuring up to 4.5 cm in diameter, which has increased in size when compared to 06/11/2007 when it measured 4.1 cm in diameter. 2. Moderate coronary artery calcifications. 3. Stable benign pulmonary nodules measuring up to 3 mm.  No new or enlarging pulmonary nodule. 4. Scattered peribronchial thickening. 5. Lesser incidental findings described above.  CT ABDOMEN AND PELVIS:  1. Dilated  common bile duct measuring up to 15 mm in diameter.  Consider further evaluation with an MRI/MRCP as well as correlation with liver function tests. 2. Distended gallbladder without secondary findings to suggest acute cholecystitis. 3. Indeterminate 9 mm posterior right midpole renal lesion.  Consider further evaluation with a renal ultrasound. 4. Linear metallic radiodensities in the prostate, which likely reflect brachytherapy seeds. 5. Lesser incidental findings described above.    Dictated by (CST): Corey Carter MD on 4/18/2025 at 5:29 PM     Finalized by (CST): Corey Carter MD on 4/18/2025 at 5:42 PM            Assessment and Plan:   89 y/o M with h/o hypertension, A-fib, CKD 3  ( stable cr)  history of prostate cancer and very active individual   living independently presented to ER after a fall in his garage on 4/18.  Per patient he has been feeling slightly weaker than his usual self.  In ER he was noted to be hypercalcemic at at 13.9.  He denies vitamin intake but does use Tums occasionally.  He was seen by endocrinology and started on IV fluids and calcitonin and we are consulted for bisphosphonate management.  Hypercalcemia workup has also been sent and CT chest abdomen pelvis been done.      Impression:    Hypercalcemia, non intact PTH mediated ( low appropriately) .  Workup reveals normal vitamin D.  PTH RP and SPEP is pending.  Agree with fluids and calcitonin and will give a dose of zoledronic acid  CKD 3. B/l cr 1.5 mg/dl. UA with rbc  Hypertension- on amlodipine and dialtiazem  A-fib= on xarelto  Right renal lesion on CT and will get renal ultrasound with complex cyst. Hold off on further mcqueen       Plan:    Ca is improving ( s/p zoledronic acid 4/19)  Slight bump in cr, likely vol depletion  Continue IVF  Mcqueen pending      Thank you very much for allowing me to participate in the care of your patient.  If you have any questions, please do not hesitate to contact me.     Mag Valdez,  MD  4/20/2025

## 2025-04-20 NOTE — CM/SW NOTE
Anticipated therapy need: Gradual Rehabilitative Therapy. Pt's son Jayson who is HCPOA agreeable to [facility] at [facility] and is requesting [facility]. [facility] review pending. [facility] referrals sent in AIDIN. List of accepting facilities will be needed for choice if [facility] is unable to accept.    PASRR level 1 screen completed and uploaded to aidin referral.    Plan: [facility] for [facility] pending facility acceptance and medical clearance.    [employer]    [phone]

## 2025-04-20 NOTE — PLAN OF CARE
Calcium remains elevated. Trending calcium Q4 hrs. Confusion, easily directable. Continued IVF. STAT abg & head CT pending eval.       Problem: Patient Centered Care  Goal: Patient preferences are identified and integrated in the patient's plan of care  Description: Interventions:- What would you like us to know as we care for you? From home alone - Provide timely, complete, and accurate information to patient/family- Incorporate patient and family knowledge, values, beliefs, and cultural backgrounds into the planning and delivery of care- Encourage patient/family to participate in care and decision-making at the level they choose- Honor patient and family perspectives and choices  Outcome: Progressing     Problem: Patient/Family Goals  Goal: Patient/Family Long Term Goal  Description: Patient's Long Term Goal: Discharge home Interventions:- IV fluids, monitor labs and vitals, follow MD orders   - See additional Care Plan goals for specific interventions  Outcome: Progressing  Goal: Patient/Family Short Term Goal  Description: Patient's Short Term Goal: Remain free from fall Interventions: -Fall precautions, use of walker   - See additional Care Plan goals for specific interventions  Outcome: Progressing     Problem: CARDIOVASCULAR - ADULT  Goal: Maintains optimal cardiac output and hemodynamic stability  Description: INTERVENTIONS:- Monitor vital signs, rhythm, and trends- Monitor for bleeding, hypotension and signs of decreased cardiac output- Evaluate effectiveness of vasoactive medications to optimize hemodynamic stability- Monitor arterial and/or venous puncture sites for bleeding and/or hematoma- Assess quality of pulses, skin color and temperature- Assess for signs of decreased coronary artery perfusion - ex. Angina- Evaluate fluid balance, assess for edema, trend weights  Outcome: Progressing  Goal: Absence of cardiac arrhythmias or at baseline  Description: INTERVENTIONS:- Continuous cardiac monitoring,  monitor vital signs, obtain 12 lead EKG if indicated- Evaluate effectiveness of antiarrhythmic and heart rate control medications as ordered- Initiate emergency measures for life threatening arrhythmias- Monitor electrolytes and administer replacement therapy as ordered  Outcome: Progressing     Problem: METABOLIC/FLUID AND ELECTROLYTES - ADULT  Goal: Electrolytes maintained within normal limits  Description: INTERVENTIONS:- Monitor labs and rhythm and assess patient for signs and symptoms of electrolyte imbalances- Administer electrolyte replacement as ordered- Monitor response to electrolyte replacements, including rhythm and repeat lab results as appropriate- Fluid restriction as ordered- Instruct patient on fluid and nutrition restrictions as appropriate  Outcome: Progressing  Goal: Hemodynamic stability and optimal renal function maintained  Description: INTERVENTIONS:- Monitor labs and assess for signs and symptoms of volume excess or deficit- Monitor intake, output and patient weight- Monitor urine specific gravity, serum osmolarity and serum sodium as indicated or ordered- Monitor response to interventions for patient's volume status, including labs, urine output, blood pressure (other measures as available)- Encourage oral intake as appropriate- Instruct patient on fluid and nutrition restrictions as appropriate  Outcome: Progressing     Problem: SAFETY ADULT - FALL  Goal: Free from fall injury  Description: INTERVENTIONS:- Assess pt frequently for physical needs- Identify cognitive and physical deficits and behaviors that affect risk of falls.- [place] fall precautions as indicated by assessment.- Educate pt/family on patient safety including physical limitations- Instruct pt to call for assistance with activity based on assessment- Modify environment to reduce risk of injury- Provide assistive devices as appropriate- Consider OT/PT consult to assist with strengthening/mobility- Encourage toileting  schedule  Outcome: Progressing

## 2025-04-20 NOTE — PHYSICAL THERAPY NOTE
PHYSICAL THERAPY EVALUATION - INPATIENT     Room Number: 508/508-A  Evaluation Date: 4/20/2025  Type of Evaluation: Initial   Physician Order: PT Eval and Treat    Presenting Problem: admitted due to fall, weakness. found to have hypercalcemia  Co-Morbidities : L ICH, prostate CA, OA  Reason for Therapy: Mobility Dysfunction and Discharge Planning    PHYSICAL THERAPY ASSESSMENT   Patient is a 89 year old male admitted 4/18/2025 fors/p fall, weakness. found to have hypercalcemia.  Prior to admission, patient's baseline is independent without a.d. pt is active - swims and walks. Lives alone in a 1 level house.  Patient is currently functioning below baseline with bed mobility, transfers, gait, and performing household tasks.  Patient is requiring moderate assist as a result of the following impairments: decreased functional strength, decreased endurance/aerobic capacity, impaired standing balance, decreased muscular endurance, and cognitive deficits (decreased safety awareness).  Physical Therapy will continue to follow for duration of hospitalization.    Patient will benefit from continued skilled PT Services to promote return to prior level of function and safety with continuous assistance and gradual rehabilitative therapy .    PLAN DURING HOSPITALIZATION  Nursing Mobility Recommendation :  (2 assist)  PT Device Recommendation: Rolling walker  PT Treatment Plan: Bed mobility, Transfer training, Balance training, Gait training, Patient education  Rehab Potential : Good  Frequency (Obs): 3-5x/week     PHYSICAL THERAPY MEDICAL/SOCIAL HISTORY     Problem List  Principal Problem:    Hypercalcemia  Active Problems:    Stage 3 chronic kidney disease (HCC)    LUH (acute kidney injury)    Traumatic left-sided intracerebral hemorrhage without loss of consciousness (HCC)      HOME SITUATION  Type of Home: House  Home Layout: One level, Able to live on main level  Stairs to Enter : 1             Lives With: Alone    Drives:  Yes   Patient Regularly Uses: None     Prior Level of [place]: independent without a.d.     SUBJECTIVE  \"I'm tired\"    PHYSICAL THERAPY EXAMINATION   OBJECTIVE  Precautions: Bed/chair alarm  Fall Risk: High fall risk    WEIGHT BEARING RESTRICTION   none    PAIN ASSESSMENT  [place]      COGNITION  Orientation Level:  oriented to person and disoriented to place  Following Commands:  follows one step commands with increased time and follows one step commands with repetition  Safety Judgement:  decreased awareness of need for safety    RANGE OF MOTION AND STRENGTH ASSESSMENT  Upper extremity ROM and strength are within functional limits   Lower extremity ROM is within functional limits   Lower extremity strength is within functional limits     BALANCE  Static Sitting: Fair  Dynamic Sitting: Fair  Static Standing: Poor  Dynamic Standing: Poor    AM-PAC '6-Clicks' INPATIENT SHORT FORM - BASIC MOBILITY  How much difficulty does the patient currently have...  Patient Difficulty: Turning over in bed (including adjusting bedclothes, sheets and blankets)?: A Little   Patient Difficulty: Sitting down on and standing up from a chair with arms (e.g., wheelchair, bedside commode, etc.): A Lot   Patient Difficulty: Moving from lying on back to sitting on the side of the bed?: A Lot   How much help from another person does the patient currently need...   Help from Another: Moving to and from a bed to a chair (including a wheelchair)?: A Lot   Help from Another: Need to walk in hospital room?: A Lot   Help from Another: Climbing 3-5 steps with a railing?: Total     AM-PAC Score:  Raw Score: 12   Approx Degree of Impairment: 68.66%   Standardized Score (AM-PAC Scale): 35.33   CMS Modifier (G-Code): CL    FUNCTIONAL ABILITY STATUS  Functional Mobility/Gait Assessment  Gait Assistance: Moderate assistance  Distance (ft): 20 ft  Assistive Device: Rolling walker  Pattern:  (narrow JAIRON, flexed posture, decreased step  length)  Rolling: minimal assist  Sit to Supine: moderate assist  Sit to Stand: moderate assist    Exercise/Education Provided:  Bed mobility  Functional activity tolerated  Gait training  Transfer training    Skilled Therapy Provided: pt received in the bathroom with RN present. Completed sit to stand with mod assist. Verbal cues for safe hand placement, manual cues and assist to lean anteriorly to facilitate transfer. Ambulated 20 ft with RW, mod assist. Pt with flexed posture, narrow JAIRON, decreased step length. Limited by poor balance. Sit to supeine mod assist, required assist for BLE    The patient's Approx Degree of Impairment: 68.66% has been calculated based on documentation in the [facility] '6 clicks' Inpatient Basic Mobility Short Form.  Research supports that patients with this level of impairment may benefit from gradual rehab.  Final disposition will be made by interdisciplinary medical team.    Patient End of Session: Needs met, In bed, Call light within reach, RN aware of session/findings, Hospital anti-slip socks, Alarm set, Family present    CURRENT GOALS  Goals to be met by: 5/4/25  Patient Goal Patient's self-stated goal is: to walk better   Goal #1 Patient is able to demonstrate supine - sit EOB @ level: supervision     Goal #1   Current Status    Goal #2 Patient is able to demonstrate transfers EOB to/from Chair/Wheelchair at assistance level: supervision with rolling walker     Goal #2  Current Status    Goal #3 Patient is able to ambulate 150  feet with assist device (rolling walker) at assistance level: supervision   Goal #3   Current Status    Goal #4 Patient will negotiate 1 stairs/one curb w/ assistive device and supervision   Goal #4   Current Status    Goal #5 Patient to demonstrate independence with home activity/exercise instructions provided to patient in preparation for discharge.   Goal #5   Current Status      Patient Evaluation Complexity Level:  History Moderate - 1 or 2 personal  factors and/or co-morbidities   Examination of body systems Moderate - addressing a total of 3 or more elements   Clinical Presentation  Moderate - Evolving   Clinical Decision Making  Moderate Complexity     Gait Training: 15 minutes  Therapeutic Activity:  10 minutes

## 2025-04-20 NOTE — PROGRESS NOTES
[facility]  part of [facility]    Progress Note    Vinny Smith Jr. Patient Status:  Inpatient    1928 MRN [id number]   Location [facility]5W Attending Arnel Barlow MD   Hosp Day # 2 PCP Suman Perla MD     Subjective:  Gets confused intermittently        A comprehensive 10 point review of systems was completed.  Pertinent positives and negatives noted in the the HPI.      Objective/Physical Exam:  Physical Exam:   Vital Signs:  Blood pressure 156/77, pulse 81, temperature 99.2 °F (37.3 °C), temperature source Oral, resp. rate 16, height 5' 11\" (1.803 m), weight 165 lb (74.8 kg), SpO2 95%.  General Appearance:  alert, well developed, in no acute distress  Head: Atraumatic  Eyes:  normal conjunctivae, sclera., normal sclera and normal pupils  Throat/Neck: normal sound to voice. Normal hearing, normal speech  Respiratory:  Speaking in full sentences, non-labored. no increased work of breathing, no audible wheezing    Neuro: motor grossly intact, moving all extremities without difficulty  Extremities: no obvious extremity swelling    Labs:  Pertinent data reviewed     Assessment/Plan:  Problem List[1]    Hypercalcemia  PTH is appropriately low. Vitamin D is normal   Hypercalcemia is related to non-PTH causes  Vit 1,25 [place] , PTHrp, SPEP  ordered     Patient is on IV fluids  He is status post calcitonin 1-2 doses  He has also received 25  Calcium levels a little better   recommend monitoring calcium every 12 hours    We will follow       When patient is medically cleared for discharge recommend he follow-up with his PCP in 1 week.  Recommend monitoring calcium in 1 week.    Mariella Benavidez MD         [1]   Patient Active Problem List  Diagnosis    Hypothyroidism    Hearing loss    Mixed hyperlipidemia    Primary hypertension    Atrial fibrillation (HCC)    Stage 3 chronic kidney disease (HCC)    Atherosclerosis of aorta    Other emphysema (HCC)    Age-related  nuclear cataract of both eyes    [place], vitreous, bilateral    Intracranial hemorrhage (HCC)    Osteoarthritis of multiple joints    History of prostate cancer    Abdominal pain    Acute prostatitis    Arthropathy    Contusion of face, scalp, and neck    Dysphonia    Elevated prostate specific antigen (PSA)    Hematuria    Hyperplasia of prostate without lower urinary tract symptoms (LUTS)    Impacted cerumen    Microscopic hematuria    Rotator cuff syndrome of right shoulder    Urinary frequency    Hypercalcemia    LUH (acute kidney injury)    Traumatic left-sided intracerebral hemorrhage without loss of consciousness (HCC)

## 2025-04-20 NOTE — PROGRESS NOTES
Late entry  Was paged by RN  last night when informed me that patient is confused.  His calcium is coming down.  He received the second dose of calcitonin recently.  He is also getting IV fluids  I recommended that the patient be evaluated by the in-hospital physician.  Given change in clinical status.  RN states she will contact the primary attending/covering physician.    Mariella gates  Endocrinology

## 2025-04-20 NOTE — PLAN OF CARE
Significant Event - Fall Note    Date/Time of Fall: April 20, 2025 at 0400    Fall huddle completed: Yes    Description of patient fall:     Patient fell from: Bed     Activity when fall occurred: Ambulating without assistance or assistive devices     Where did fall occur: Patient room     Was the fall assisted: Found on floor/unassisted to floor    Who witnessed the fall: Staff    Patient narrative of fall: \"thought he heard sons voice, tried to get up and slid on floor\"    Staff narrative of fall: heard patient yell, went into room and found patient on the floor. Bed alarm was off, x3 side rails were up, pt went over the x2 side rails on the R side of bed. Pt was in a confused state related to elevated calcium. Nursing & PCT were increasing rounds throughout the night. Last round on patient by RN was around 3 am lasting around 15 min. RN ensured bed alarm was engaged at this time. Lab was in the room shortly after for a scheduled lab draw. pt was found on floor with lab draw equipment at 0430 without bed being engaged alarm engaged.     Name of Provider notified of fall: Mally Weinstein MD    Family notification: Family notified Jayson (Son)    Factors contributing to fall:     Physical: Impaired mobility/transfer     Psychological: Confused     Environmental: Equipment     Medications received in the past 8 hours:   Medication(s) Administered in past 8 Hours from 04/19/2025 2057 to 04/20/2025 0457       Date/Time Order Dose Route Action Action by Comments    04/20/2025 0149 CDT sodium chloride 0.9% infusion -- Intravenous New Bag Tasha Harris, FRAN --            Was patient identified as high fall risk prior to fall: YES                               What interventions were in place prior to fall: Bed alarm    Interventions post fall: Bed alarm, Bed in lowest position, Call light within reach, Fall alert wristband, and [place] footwear    Additional comments:

## 2025-04-20 NOTE — CONSULTS
ARA BENSON M.D., F.A.A.N.S.     of Neurosurgery  [facility]  Board Certified Neurosurgeon                              [facility]        Center for [facility]      [address]  [address]  [place] [address]    PHONE  [phone]          FAX  [phone]    [email or web address]    [facility]  part of [facility]     NEUROSURGERY CONSULTATION    Vinny Smith Jr. Patient Status:  Inpatient    1928 MRN [id number]   Location [facility]5W Attending Mark Varner MD   Hosp Day # 2 PCP Suman Perla MD     Date of Admission:  2025  Date of Consult:  2025    Reason for Consultation:  ICH    History of Present Illness:  Vinny Smith Jr. is a a(n) 89 year old male.  Pleasant gentleman with history of well-controlled hypertension, status post fall last night on Xarelto.  Primary reason for admission is hypercalcemia.  CT of the head status post fall showed a small 4 mm hyperdensity in the left cerebellar peduncle.    History:  Past Medical History[1]  Past Surgical History[2]  Family History[3]   reports that he has never smoked. He has never used smokeless tobacco. He reports that he does not drink alcohol and does not use drugs.    Allergies:  Allergies[4]    Medications:  Current Hospital Medications[5]    Review of Systems:  A 10-point system was reviewed.  Pertinent positives and negatives are noted in HPI.      Physical Exam:  Temp:  [97.7 °F (36.5 °C)-98.6 °F (37 °C)] 97.9 °F (36.6 °C)  Pulse:  [] 78  Resp:  [18-22] 18  BP: (138-154)/(75-87) 149/79  SpO2:  [91 %-94 %] 92 %  I/O last 3 completed shifts:  In: 5601.3 [P.O.:810; I.V.:4686.3; IV PIGGYBACK:105]  Out: 5570 [Urine:5570]      Neurological Exam:  Asleep, arousable, following commands  PERRLA  EOMI  MAEx4  Sensation symmetrical    Abdomen:  Soft, non-distended, non-tender, with no rebound or  guarding.  No peritoneal signs.     Extremities:  Non-tender, no lower extremity edema noted.      Labs:  Lab Results   Component Value Date    WBC 10.3 04/19/2025    HGB 10.7 (L) 04/19/2025    .0 04/19/2025    BUN 27 (H) 04/20/2025     04/20/2025    K 4.4 04/20/2025    CO2 29.0 04/20/2025     (H) 04/20/2025    ALB 3.9 04/18/2025    PTT 39.0 (H) 02/17/2022    INR 1.88 (H) 02/17/2022       Imaging:  CT BRAIN OR HEAD (CPT=70450)  Result Date: 4/20/2025  CONCLUSION:  1. Small 4 mm high density focus in the left cerebellum is new since comparison head CT from June, 2022. This is concerning for a small left cerebellar hematoma (could relate to recent trauma or a hemorrhagic lacunar infarct) or less likely interval development of a left cerebellar calcification.  No associated mass effect or midline shift.  Suggest continued head CT surveillance to ensure stability/resolution.  This finding was not described in the preliminary report and a message regarding discrepancy was sent to Dr. Copeland (covering for Dr. Weinstein) via perfect serve at the time of dictation. 2. Nonspecific white matter changes involving both cerebral hemispheres that most likely reflect sequelae of chronic microangiopathy. 3. Intracranial atherosclerosis. 4. Nonspecific subtotal opacification of the right mastoid air cells.   A preliminary report was issued by the [facility] teleradiology service. There are no major discrepancies.  elm-remote  Dictated by (CST): Randy Yao MD on 4/20/2025 at 6:58 AM     Finalized by (CST): Randy Yao MD on 4/20/2025 at 7:06 AM          CT CHEST+ABDOMEN+PELVIS(CPT=71250/06329)  Result Date: 4/18/2025  CONCLUSION:   CT CHEST:  1. Ectatic ascending aorta measuring up to 4.5 cm in diameter, which has increased in size when compared to 06/11/2007 when it measured 4.1 cm in diameter. 2. Moderate coronary artery calcifications. 3. Stable benign pulmonary nodules measuring up to 3 mm.  No new  or enlarging pulmonary nodule. 4. Scattered peribronchial thickening. 5. Lesser incidental findings described above.  CT ABDOMEN AND PELVIS:  1. Dilated common bile duct measuring up to 15 mm in diameter.  Consider further evaluation with an MRI/MRCP as well as correlation with liver function tests. 2. Distended gallbladder without secondary findings to suggest acute cholecystitis. 3. Indeterminate 9 mm posterior right midpole renal lesion.  Consider further evaluation with a renal ultrasound. 4. Linear metallic radiodensities in the prostate, which likely reflect brachytherapy seeds. 5. Lesser incidental findings described above.    Dictated by (CST): Corey Carter MD on 4/18/2025 at 5:29 PM     Finalized by (CST): Corey Carter MD on 4/18/2025 at 5:42 PM             Assessment/Plan:  Principal Problem:    Hypercalcemia  Active Problems:    Stage 3 chronic kidney disease (HCC)    LUH (acute kidney injury)    Status post fall, with evidence of a small, nonoperative left cerebellar hyperdensity of unknown significance.  Differential diagnosis includes a spontaneous ICH associated either with hypertension or, less likely, amyloid or lesional etiology.  Also, the possibility of a posttraumatic small bleed is possible, particularly in the setting of anticoagulation.  Will obtain follow-up head CT tomorrow.  Hold blood thinners for now.  This was discussed with the patient and his family as well as the nursing staff.    More than 60 minutes were spent in consultation and coordination of this patient's care. All questions and concerns were addressed. We appreciate the opportunity to participate in the care of this patient. Please do not hesitate to call our office ([phone]) with any issues.        Nghia Aden M.D., F.A.A.N.S.    4/20/2025  11:55 AM      This note has been dictated utilizing voice recognition software. Unfortunately, this may lead to occasional typographical errors. If there are any questions  regarding this, please do not hesitate to contact our office.        [1]   Past Medical History:   Age-related nuclear cataract of both eyes    Arrhythmia    Contusion of left hip and thigh, initial encounter    Cough    Epistaxis    Floater, vitreous, bilateral    Global amnesia    per N minutes in [place]    Headache    High blood pressure    Hypothyroid    Inguinal hernia    Nocturia    Palpitations    toprol    Perforated ear drum    Physical exam, annual    Prostate cancer (HCC)    seed implant    Pulse irregularity   [2]   Past Surgical History:  Procedure Laterality Date    Colonoscopy      Electrocardiogram, complete  2012    scanned to media tab    Inguinal hernia repair      Inner ear surgery proc unlisted      mastoidectomy   [3]   Family History  Problem Relation Age of Onset    Gastro-Intestinal Disorder Father         diverticulitis    Heart Disease Mother         coronary artery disease (cause of death)    Arthritis Sister     Diabetes Maternal Grandmother     Glaucoma Neg     Macular degeneration Neg    [4]   Allergies  Allergen Reactions    Paxil [Paroxetine] RASH    Sulfacetamide      Other reaction(s): SULFA (SULFONAMIDE ANTIBIOTICS)   [5]   Current Facility-Administered Medications:     amLODIPine (Norvasc) tab 5 mg, 5 mg, Oral, Daily    atorvastatin (Lipitor) tab 10 mg, 10 mg, Oral, Daily    dilTIAZem ER (Dilacor XR) 24 hr cap 120 mg, 120 mg, Oral, Daily    levothyroxine (Synthroid) tab 100 mcg, 100 mcg, Oral, Before breakfast    [Held by provider] rivaroxaban (Xarelto) tab 15 mg, 15 mg, Oral, Daily with food    pantoprazole (Protonix) DR tab 40 mg, 40 mg, Oral, Before breakfast    sodium chloride 0.9% infusion, , Intravenous, Continuous    acetaminophen (Tylenol) tab 650 mg, 650 mg, Oral, Q4H PRN **OR** HYDROcodone-acetaminophen (Norco) 5-325 MG per tab 1 tablet, 1 tablet, Oral, Q4H PRN **OR** HYDROcodone-acetaminophen (Norco) 5-325 MG per tab 2 tablet, 2 tablet, Oral, Q4H PRN     morphINE PF 2 MG/ML injection 1 mg, 1 mg, Intravenous, Q2H PRN **OR** morphINE PF 2 MG/ML injection 2 mg, 2 mg, Intravenous, Q2H PRN **OR** morphINE PF 4 MG/ML injection 4 mg, 4 mg, Intravenous, Q2H PRN    polyethylene glycol (PEG 3350) (Miralax) 17 g oral packet 17 g, 17 g, Oral, Daily PRN    sennosides (Senokot) tab 17.2 mg, 17.2 mg, Oral, Nightly PRN    bisacodyl (Dulcolax) 10 MG rectal suppository 10 mg, 10 mg, Rectal, Daily PRN    ondansetron (Zofran) 4 MG/2ML injection 4 mg, 4 mg, Intravenous, Q6H PRN    metoclopramide (Reglan) 5 mg/mL injection 5 mg, 5 mg, Intravenous, Q8H PRN

## 2025-04-21 ENCOUNTER — APPOINTMENT (OUTPATIENT)
Dept: CT IMAGING | Facility: HOSPITAL | Age: OVER 89
End: 2025-04-21
Attending: NEUROLOGICAL SURGERY
Payer: MEDICARE

## 2025-04-21 PROBLEM — Z51.5 PALLIATIVE CARE BY SPECIALIST: Status: ACTIVE | Noted: 2025-01-01

## 2025-04-21 PROBLEM — Z51.5 PALLIATIVE CARE BY SPECIALIST: Status: ACTIVE | Noted: 2025-04-21

## 2025-04-21 PROBLEM — R41.82 ALTERED MENTAL STATUS: Status: ACTIVE | Noted: 2025-04-21

## 2025-04-21 PROBLEM — R41.82 ALTERED MENTAL STATUS: Status: ACTIVE | Noted: 2025-01-01

## 2025-04-21 LAB
ALBUMIN SERPL ELPH-MCNC: 3.63 G/DL (ref 3.75–5.21)
ALBUMIN SERPL-MCNC: 3.3 G/DL (ref 3.2–4.8)
ALBUMIN/GLOB SERPL: 1.11 {RATIO} (ref 1–2)
ALPHA1 GLOB SERPL ELPH-MCNC: 0.39 G/DL (ref 0.19–0.46)
ALPHA2 GLOB SERPL ELPH-MCNC: 0.68 G/DL (ref 0.48–1.05)
ANION GAP SERPL CALC-SCNC: 6 MMOL/L (ref 0–18)
B-GLOBULIN SERPL ELPH-MCNC: 0.73 G/DL (ref 0.68–1.23)
BUN BLD-MCNC: 40 MG/DL (ref 9–23)
BUN/CREAT SERPL: 15.8 (ref 10–20)
CALCIUM BLD-MCNC: 10.6 MG/DL (ref 8.7–10.4)
CALCIUM BLD-MCNC: 10.8 MG/DL (ref 8.7–10.4)
CALCIUM BLD-MCNC: 10.8 MG/DL (ref 8.7–10.4)
CALCIUM BLD-MCNC: 11.2 MG/DL (ref 8.7–10.4)
CHLORIDE SERPL-SCNC: 112 MMOL/L (ref 98–112)
CO2 SERPL-SCNC: 25 MMOL/L (ref 21–32)
CREAT BLD-MCNC: 2.53 MG/DL (ref 0.7–1.3)
EGFRCR SERPLBLD CKD-EPI 2021: 22 ML/MIN/1.73M2 (ref 60–?)
GAMMA GLOB SERPL ELPH-MCNC: 1.47 G/DL (ref 0.62–1.7)
GLUCOSE BLD-MCNC: 103 MG/DL (ref 70–99)
KAPPA LC FREE SER-MCNC: 11.3 MG/DL (ref 0.33–1.94)
KAPPA LC FREE/LAMBDA FREE SER NEPH: 1.38 {RATIO} (ref 0.26–1.65)
LAMBDA LC FREE SERPL-MCNC: 8.2 MG/DL (ref 0.57–2.63)
M PROTEIN 1 SERPL ELPH-MCNC: 0.86 G/DL (ref ?–0)
MAGNESIUM SERPL-MCNC: 1.8 MG/DL (ref 1.6–2.6)
OSMOLALITY SERPL CALC.SUM OF ELEC: 306 MOSM/KG (ref 275–295)
PHOSPHATE SERPL-MCNC: 2.9 MG/DL (ref 2.4–5.1)
POTASSIUM SERPL-SCNC: 3.9 MMOL/L (ref 3.5–5.1)
PROT SERPL-MCNC: 6.9 G/DL (ref 5.7–8.2)
SODIUM SERPL-SCNC: 143 MMOL/L (ref 136–145)
VIT D 1,25 DI-OH: 13.1 PG/ML

## 2025-04-21 PROCEDURE — 99233 SBSQ HOSP IP/OBS HIGH 50: CPT | Performed by: HOSPITALIST

## 2025-04-21 PROCEDURE — 99497 ADVNCD CARE PLAN 30 MIN: CPT | Performed by: INTERNAL MEDICINE

## 2025-04-21 PROCEDURE — 99223 1ST HOSP IP/OBS HIGH 75: CPT | Performed by: INTERNAL MEDICINE

## 2025-04-21 PROCEDURE — 99233 SBSQ HOSP IP/OBS HIGH 50: CPT | Performed by: NEUROLOGICAL SURGERY

## 2025-04-21 PROCEDURE — 70450 CT HEAD/BRAIN W/O DYE: CPT | Performed by: NEUROLOGICAL SURGERY

## 2025-04-21 PROCEDURE — 99233 SBSQ HOSP IP/OBS HIGH 50: CPT | Performed by: INTERNAL MEDICINE

## 2025-04-21 RX ORDER — HEPARIN SODIUM 5000 [USP'U]/ML
5000 INJECTION, SOLUTION INTRAVENOUS; SUBCUTANEOUS EVERY 12 HOURS
Status: DISCONTINUED | OUTPATIENT
Start: 2025-04-21 | End: 2025-04-25

## 2025-04-21 RX ORDER — MAGNESIUM OXIDE 400 MG/1
400 TABLET ORAL ONCE
Status: COMPLETED | OUTPATIENT
Start: 2025-04-21 | End: 2025-04-21

## 2025-04-21 NOTE — PROGRESS NOTES
Hospice referral received. [facility] will follow up with family to set up informational meeting.     Estela Weller  Residential Liaison  [phone]

## 2025-04-21 NOTE — CM/SW NOTE
Reserved with [facility] per son's choice.  Patient is not yet medically ready for dc.    [facility] notified of need for hospice informational session.    Lis BUSTAMANTEA BSN RN CRRN   RN Case Manager  [phone]

## 2025-04-21 NOTE — PROGRESS NOTES
Calcium level improved  Will sign off  Recommend FU with PCP and calcium monitoring in one week     Mariella Chi

## 2025-04-21 NOTE — PLAN OF CARE
Problem: Patient Centered Care  Goal: Patient preferences are identified and integrated in the patient's plan of care  Description: Interventions:- What would you like us to know as we care for you? From home alone - Provide timely, complete, and accurate information to patient/family- Incorporate patient and family knowledge, values, beliefs, and cultural backgrounds into the planning and delivery of care- Encourage patient/family to participate in care and decision-making at the level they choose- Honor patient and family perspectives and choices  Outcome: Progressing     Problem: Patient/Family Goals  Goal: Patient/Family Long Term Goal  Description: Patient's Long Term Goal: Discharge home Interventions:- IV fluids, monitor labs and vitals, follow MD orders   - See additional Care Plan goals for specific interventions  Outcome: Progressing  Goal: Patient/Family Short Term Goal  Description: Patient's Short Term Goal: Remain free from fall Interventions: -Fall precautions, use of walker   - See additional Care Plan goals for specific interventions  Outcome: Progressing     Problem: CARDIOVASCULAR - ADULT  Goal: Maintains optimal cardiac output and hemodynamic stability  Description: INTERVENTIONS:- Monitor vital signs, rhythm, and trends- Monitor for bleeding, hypotension and signs of decreased cardiac output- Evaluate effectiveness of vasoactive medications to optimize hemodynamic stability- Monitor arterial and/or venous puncture sites for bleeding and/or hematoma- Assess quality of pulses, skin color and temperature- Assess for signs of decreased coronary artery perfusion - ex. Angina- Evaluate fluid balance, assess for edema, trend weights  Outcome: Progressing  Goal: Absence of cardiac arrhythmias or at baseline  Description: INTERVENTIONS:- Continuous cardiac monitoring, monitor vital signs, obtain 12 lead EKG if indicated- Evaluate effectiveness of antiarrhythmic and heart rate control medications as  ordered- Initiate emergency measures for life threatening arrhythmias- Monitor electrolytes and administer replacement therapy as ordered  Outcome: Progressing     Problem: METABOLIC/FLUID AND ELECTROLYTES - ADULT  Goal: Electrolytes maintained within normal limits  Description: INTERVENTIONS:- Monitor labs and rhythm and assess patient for signs and symptoms of electrolyte imbalances- Administer electrolyte replacement as ordered- Monitor response to electrolyte replacements, including rhythm and repeat lab results as appropriate- Fluid restriction as ordered- Instruct patient on fluid and nutrition restrictions as appropriate  Outcome: Progressing  Goal: Hemodynamic stability and optimal renal function maintained  Description: INTERVENTIONS:- Monitor labs and assess for signs and symptoms of volume excess or deficit- Monitor intake, output and patient weight- Monitor urine specific gravity, serum osmolarity and serum sodium as indicated or ordered- Monitor response to interventions for patient's volume status, including labs, urine output, blood pressure (other measures as available)- Encourage oral intake as appropriate- Instruct patient on fluid and nutrition restrictions as appropriate  Outcome: Progressing     Problem: SAFETY ADULT - FALL  Goal: Free from fall injury  Description: INTERVENTIONS:- Assess pt frequently for physical needs- Identify cognitive and physical deficits and behaviors that affect risk of falls.- [place] fall precautions as indicated by assessment.- Educate pt/family on patient safety including physical limitations- Instruct pt to call for assistance with activity based on assessment- Modify environment to reduce risk of injury- Provide assistive devices as appropriate- Consider OT/PT consult to assist with strengthening/mobility- Encourage toileting schedule  Outcome: Progressing      Monitoring vital signs, stable at this time. No acute changes at this moment. Pain medication provided  as needed. Fall precautions in place- bed alarm on, bed locked in lowest position, call light within reach. Frequent rounding by nursing staff.

## 2025-04-21 NOTE — CONGREGATE LIVING REVIEW
[facility] Living Authorization    The [facility] Review Committee has reviewed this case and the patient IS APPROVED for discharge to a facility for Short Term Skilled once the following procedure is followed:     - The physician discharge instructions (contained within the KEVIN note for SNF) must inlcude the below appropriate and approved COVID instructions to the facility    For questions regarding CLRC approval process, please contact the CM assigned to the case.  For questions regarding RN discharge workflow, please contact the unit Clinical Leader.

## 2025-04-21 NOTE — PROGRESS NOTES
ARA BENSON M.D., F.A.A.N.S.     of Neurosurgery  [facility]  Board Certified Neurosurgeon                          [facility]  part of [facility] for [facility]      [address]  [address] [place] [address]    PHONE  [phone]          FAX  [phone]    [email or web address]      NEUROSURGERY PROGRESS NOTE      Vinny Smith Jr. Patient Status:  Inpatient    1928 MRN [id number]   Location [facility]5W Attending Mark Varner MD   Hosp Day # 3 PCP Suman Perla MD     Subjective:  Vinny Smith Jr. is a(n) 89 year old male.  Alert, orientated x3.  Cooperative.  No apparent distress.      Vital Signs:    Temp:  [98.2 °F (36.8 °C)-99.2 °F (37.3 °C)] 98.3 °F (36.8 °C)  Pulse:  [68-87] 73  Resp:  [16-18] 16  BP: (144-156)/(65-89) 150/77  SpO2:  [90 %-96 %] 92 %    I/O:  I/O last 3 completed shifts:  In: 5820.4 [P.O.:370; I.V.:5450.4]  Out: 2550 [Urine:2550]    Inpatient Medications:  Current Hospital Medications[1]    Labs:  Lab Results   Component Value Date    WBC 10.3 2025    HGB 10.7 (L) 2025    .0 2025    BUN 40 (H) 2025     2025    K 3.9 2025    CO2 25.0 2025     (H) 2025    ALB 3.3 2025    PTT 39.0 (H) 2022    INR 1.88 (H) 2022         Neurological Exam:  Asleep, arousable   PERRLA  EOMI  MAEx4  Sensation symmetrical     Abdomen:  Soft, non-distended, non-tender, with no rebound or guarding.  No peritoneal signs.   Extremities:  Non-tender, no lower extremity edema noted.        Imaging:  CT BRAIN OR HEAD (CPT=70450)  Result Date: 2025  CONCLUSION:   1. Stable 4 mm region of hyperdensity in the left cerebellar hemisphere, which is concerning for a small intraparenchymal hematoma. 2. No new or worsening acute intracranial hemorrhage. 3. No  transcortical area of hypoattenuation to suggest an acute infarct. 4. No midline shift, mass effect, or hydrocephalus. 5. Multiple chronic findings are described above.    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 Neurologically stable.   No further imaging necessary unless indicated by neuro change.  Keep off blood thinners for 2 weeks. OK to do hep subcutaneous.  Discussed utility of outpatient MRI pre and post contrast but cannot do at this time given kidney issues.  May f/u with [facility] clinic as needed.    All questions and concerns were addressed. We appreciate the opportunity to participate in the care of this patient. Please do not hesitate to call our office ([phone]) with any issues.          Nghia Aden M.D., F.A.A.N.S.    4/21/2025  10:18 AM    This note has been dictated utilizing voice recognition software. Unfortunately, this may lead to occasional typographical errors. If there are any questions regarding this, please do not hesitate to contact our office.        [1]   Current Facility-Administered Medications:     amLODIPine (Norvasc) tab 5 mg, 5 mg, Oral, Daily    atorvastatin (Lipitor) tab 10 mg, 10 mg, Oral, Daily    dilTIAZem ER (Dilacor XR) 24 hr cap 120 mg, 120 mg, Oral, Daily    levothyroxine (Synthroid) tab 100 mcg, 100 mcg, Oral, Before breakfast    pantoprazole (Protonix) DR tab 40 mg, 40 mg, Oral, Before breakfast    sodium chloride 0.9% infusion, , Intravenous, Continuous    acetaminophen (Tylenol) tab 650 mg, 650 mg, Oral, Q4H PRN **OR** HYDROcodone-acetaminophen (Norco) 5-325 MG per tab 1 tablet, 1 tablet, Oral, Q4H PRN  **OR** HYDROcodone-acetaminophen (Norco) 5-325 MG per tab 2 tablet, 2 tablet, Oral, Q4H PRN    morphINE PF 2 MG/ML injection 1 mg, 1 mg, Intravenous, Q2H PRN **OR** morphINE PF 2 MG/ML injection 2 mg, 2 mg, Intravenous, Q2H PRN **OR** morphINE PF 4 MG/ML injection 4 mg, 4 mg, Intravenous, Q2H PRN    polyethylene glycol (PEG 3350) (Miralax) 17 g oral packet 17 g, 17 g, Oral, Daily PRN    sennosides (Senokot) tab 17.2 mg, 17.2 mg, Oral, Nightly PRN    bisacodyl (Dulcolax) 10 MG rectal suppository 10 mg, 10 mg, Rectal, Daily PRN    ondansetron (Zofran) 4 MG/2ML injection 4 mg, 4 mg, Intravenous, Q6H PRN    metoclopramide (Reglan) 5 mg/mL injection 5 mg, 5 mg, Intravenous, Q8H PRN

## 2025-04-21 NOTE — CONSULTS
Palliative Care Initial Inpatient Consult    Vinny Smith JrDianna Patient Status:  Inpatient    1928 MRN [id number]   Location [facility]5W Attending Mark Varner MD   Hosp Day # 3 PCP Suman Perla MD     Date of Consult: 2025  Patient seen at: [facility]    Reason for Consultation: Consult requested for goals of care and care coordination.    Subjective     History of Present Illness: 89 y/o M with h/o hypertension, A-fib, CKD 3  ( stable cr)  history of prostate cancer and very active individual   living independently presented to ER after a fall in his garage on .  Per patient he has been feeling slightly weaker than his usual self.  In ER he was noted to be hypercalcemic at at 13.9.  He denies vitamin intake but does use Tums occasionally. He was seen by endocrinology and started on IV fluids and calcitonin. Hypercalcemia workup has also been sent and CT chest abdomen pelvis been done.History was obtained from [facility] and patient/family.  Our palliative care service was asked to evaluate the patient for a goals of care discussion and symptom management.      Review of Systems: Palliative Care symptom needs assessed:     Denies dypsnea.   Denies cough or lightheadedness/dizziness.   No pain issues.   Minimal appetite  Denies n/v   No constipation/diarrhea issues.   No depression or anxiety.      Palliative Care Social History:   Marital Status:    Children: Yes  Living Situation Prior to Admit: Home  Occupational History: Retired  Personal:     Spiritual  Vinny Smith JrDianna RICK    requested: No    Medical History: obtained from [facility]  Past Medical History[1]  Past Surgical History[2]    Family History: obtained from [facility]  Family History[3]    Allergies:  Allergies[4]    Medications:   Current Hospital Medications[5]  Prior to Admission Medications[6]      Palliative Performance Scale: 40 %  % Ambulation Activity Level Self-Care Intake Consciousness    100 Full  Normal  No Disease Full Normal Full   90 Full  Normal  Some Disease Full Normal Full   80 Full  Normal w/effort  Some Disease Full Normal or reduced Full   70 Reduced  Can't Perform Job  Some Disease Full Normal or reduced Full   60 Reduced  Can't Perform Hobby   Significant Disease Occ Assist Normal or reduced Full or confused   50 Mainly sit/lie Can't do any work  Extensive Disease Partial Assist Normal or reduced Full or confused   40 Mainly in bed Can't do any work  Extensive Disease Mainly Assist Normal or reduced Full or confused   30 Bed Bound Can't do any work  Extensive Disease Max Assist  Total Care Reduced  Drowsy/confused   20 Bed Bound Can't do any work  Extensive Disease Max Assist  Total Care Minimal  Drowsy/confused   10 Bed Bound Can't do any work  Extensive Disease Max Assist  Total Care Mouth Care  Drowsy/confused   0 Death        Objective      Vital Signs:  Blood pressure 107/80, pulse 82, temperature 98.6 °F (37 °C), temperature source Axillary, resp. rate 20, height 5' 11\" (1.803 m), weight 165 lb (74.8 kg), SpO2 93%.  Body mass index is 23.01 kg/m².  Non-verbal signs of pain present: No    Physical Exam:  General: Alert, awake and in no  apparent respiratory distress.  HEENT: MMM. No obvious focal deficits.   Cardiac: RRR  Lungs: Normal effort on RA  Abdomen: Soft, non-distended  Extremities: + Edema present bilateral upper extremities  Skin: Warm and dry.    Hematology:  Lab Results   Component Value Date    WBC 10.3 04/19/2025    HGB 10.7 (L) 04/19/2025    HCT 32.3 (L) 04/19/2025    .0 04/19/2025       Coags:  Lab Results   Component Value Date    INR 1.88 (H) 02/17/2022    PTT 39.0 (H) 02/17/2022       Chemistry:  Lab Results   Component Value Date    CREATSERUM 2.53 (H) 04/21/2025    BUN 40 (H) 04/21/2025     04/21/2025    K 3.9 04/21/2025     04/21/2025    CO2 25.0 04/21/2025     (H) 04/21/2025    CA 10.6 (H) 04/21/2025    ALB 3.3 04/21/2025     ALKPHO 111 04/18/2025    BILT 0.4 04/18/2025    TP 6.9 04/18/2025    AST 43 (H) 04/18/2025    ALT 26 04/18/2025    PSA <0.01 11/25/2022    MG 1.8 04/21/2025    PHOS 2.9 04/21/2025    TROP 0.02 12/17/2018       Imaging:  CT BRAIN OR HEAD (CPT=70450)  Result Date: 4/21/2025  CONCLUSION:   1. Stable 4 mm region of hyperdensity in the left cerebellar hemisphere, which is concerning for a small intraparenchymal hematoma. 2. No new or worsening acute intracranial hemorrhage. 3. No transcortical area of hypoattenuation to suggest an acute infarct. 4. No midline shift, mass effect, or hydrocephalus. 5. Multiple chronic findings are described above.    Dictated by (CST): Corey Carter MD on 4/21/2025 at 7:46 AM     Finalized by (CST): Corey Carter MD on 4/21/2025 at 7:53 AM          CT BRAIN OR HEAD (CPT=70450)  Result Date: 4/20/2025  CONCLUSION:  1. Small 4 mm high density focus in the left cerebellum is new since comparison head CT from June, 2022. This is concerning for a small left cerebellar hematoma (could relate to recent trauma or a hemorrhagic lacunar infarct) or less likely interval development of a left cerebellar calcification.  No associated mass effect or midline shift.  Suggest continued head CT surveillance to ensure stability/resolution.  This finding was not described in the preliminary report and a message regarding discrepancy was sent to Dr. Copeland (covering for Dr. Weinstein) via perfect serve at the time of dictation. 2. Nonspecific white matter changes involving both cerebral hemispheres that most likely reflect sequelae of chronic microangiopathy. 3. Intracranial atherosclerosis. 4. Nonspecific subtotal opacification of the right mastoid air cells.   A preliminary report was issued by the [facility] teleradiology service. There are no major discrepancies.  elm-remote  Dictated by (CST): Randy Yao MD on 4/20/2025 at 6:58 AM     Finalized by (CST): Randy Yao MD on 4/20/2025 at 7:06  AM            Assessment and Recommendations        Hypercalcemia    Stage 3 chronic kidney disease (HCC)    LUH (acute kidney injury)    Traumatic left-sided intracerebral hemorrhage without loss of consciousness (HCC)    Altered mental status      Symptom Management      NA    2.     Advanced Care Planning  A voluntarily discussion and explanation regarding Advance Care Planning (ACP) took place today with patient and son. We discussed the risks vs benefits of life sustaining treatments in the setting of Vinny Smith Jr.'s comorbid medical conditions. Items addressed: patient preferences , code status , health care proxy, change in health status , and end-of-life care plan. This resulted in a better understanding of ACP documentation , a better understanding of pt's expressed wishes/preferences , confirmation of code status , and family discussions to continue privately .   Summary:     Total time dedicated to ACP >46 minutes .       3.     Serious Illness Conversation:   Code Status: DNAR/selective  Vinny was in good health, he did have a fall and small SDH? A few years ago when his wife was hospitalized for cancer complications. She passed away on hospice in a nursing facility. He always maintained that he would never want to be in a rehab or nursing facility like she was.   He is highly independent, swimming at the pool daily, driving, doing chores/[place] etc.   His family says that as he's become acutely ill, he has been asking for them to 'get this over with' 'take him out and shoot him' etc etc.   We started to discuss hospice care at home, which family understood and was in general agreement with.   Abruptly, we asked him if that's what he wanted, and he said that 'no, it was NOT time for him to go see [facility] (in [facility])' and that he wanted to get back to his level of independence.   We asked if he'd be willing to undergo medical testing and treatment and even participate in rehab in order to improve  he said 'yes whatever it takes.' He also became delirious and started speaking in difficult to understand terms about 'going through hell' or 'going to hell' etc etc.   PLAN is to provide hospice information, wait to see what how his mental status and kidney function can improve. Family is in universal agreement that they would not want hemodialysis or artificial nutrition. I will follow.      Palliative Care Follow Up: Palliative care team will Continue to follow while inpatient    Thank you for allowing Palliative Care services to participate in the care of Vinny Smith Jr..      A total of 80 minutes were spent on this visit, which included all of the following: direct face to face contact, history taking, physical examination, and >50% was spent counseling and coordinating care.    Sebastian Bautista MD  2025  3:54 PM  Palliative Care Services  [facility] [phone]    Note to patient:  The  Cures Act makes medical notes like these available to patients in the interest of transparency. However, be advised this is a medical document. It is intended as peer to peer communication. It is written in medical language and may contain abbreviations or verbiage that are unfamiliar. It may appear blunt or direct. Medical documents are intended to carry relevant information, facts as evident, and the clinical opinion of the practitioner.           [1]   Past Medical History:   Age-related nuclear cataract of both eyes    Arrhythmia    Contusion of left hip and thigh, initial encounter    Cough    Epistaxis    Floater, vitreous, bilateral    Global amnesia    per N minutes in [place]    Headache    High blood pressure    Hypothyroid    Inguinal hernia    Nocturia    Palpitations    toprol    Perforated ear drum    Physical exam, annual    Prostate cancer (HCC)    seed implant    Pulse irregularity   [2]   Past Surgical History:  Procedure Laterality Date    Colonoscopy       Electrocardiogram, complete  08-    scanned to media tab    Inguinal hernia repair      Inner ear surgery proc unlisted      mastoidectomy   [3]   Family History  Problem Relation Age of Onset    Gastro-Intestinal Disorder Father         diverticulitis    Heart Disease Mother         coronary artery disease (cause of death)    Arthritis Sister     Diabetes Maternal Grandmother     Glaucoma Neg     Macular degeneration Neg    [4]   Allergies  Allergen Reactions    Paxil [Paroxetine] RASH    Sulfacetamide      Other reaction(s): SULFA (SULFONAMIDE ANTIBIOTICS)   [5]   Current Facility-Administered Medications:     heparin (Porcine) 5000 UNIT/ML injection 5,000 Units, 5,000 Units, Subcutaneous, Q12H    amLODIPine (Norvasc) tab 5 mg, 5 mg, Oral, Daily    atorvastatin (Lipitor) tab 10 mg, 10 mg, Oral, Daily    dilTIAZem ER (Dilacor XR) 24 hr cap 120 mg, 120 mg, Oral, Daily    levothyroxine (Synthroid) tab 100 mcg, 100 mcg, Oral, Before breakfast    pantoprazole (Protonix) DR tab 40 mg, 40 mg, Oral, Before breakfast    sodium chloride 0.9% infusion, , Intravenous, Continuous    acetaminophen (Tylenol) tab 650 mg, 650 mg, Oral, Q4H PRN **OR** HYDROcodone-acetaminophen (Norco) 5-325 MG per tab 1 tablet, 1 tablet, Oral, Q4H PRN **OR** HYDROcodone-acetaminophen (Norco) 5-325 MG per tab 2 tablet, 2 tablet, Oral, Q4H PRN    morphINE PF 2 MG/ML injection 1 mg, 1 mg, Intravenous, Q2H PRN **OR** morphINE PF 2 MG/ML injection 2 mg, 2 mg, Intravenous, Q2H PRN **OR** morphINE PF 4 MG/ML injection 4 mg, 4 mg, Intravenous, Q2H PRN    polyethylene glycol (PEG 3350) (Miralax) 17 g oral packet 17 g, 17 g, Oral, Daily PRN    sennosides (Senokot) tab 17.2 mg, 17.2 mg, Oral, Nightly PRN    bisacodyl (Dulcolax) 10 MG rectal suppository 10 mg, 10 mg, Rectal, Daily PRN    ondansetron (Zofran) 4 MG/2ML injection 4 mg, 4 mg, Intravenous, Q6H PRN    metoclopramide (Reglan) 5 mg/mL injection 5 mg, 5 mg, Intravenous, Q8H PRN  [6]   No current  outpatient medications on file.

## 2025-04-21 NOTE — PLAN OF CARE
Problem: Patient Centered Care  Goal: Patient preferences are identified and integrated in the patient's plan of care  Description: Interventions:- What would you like us to know as we care for you? From home alone - Provide timely, complete, and accurate information to patient/family- Incorporate patient and family knowledge, values, beliefs, and cultural backgrounds into the planning and delivery of care- Encourage patient/family to participate in care and decision-making at the level they choose- Honor patient and family perspectives and choices  Outcome: Progressing     Problem: Patient/Family Goals  Goal: Patient/Family Long Term Goal  Description: Patient's Long Term Goal: Discharge home Interventions:- IV fluids, monitor labs and vitals, follow MD orders   - See additional Care Plan goals for specific interventions  Outcome: Progressing  Goal: Patient/Family Short Term Goal  Description: Patient's Short Term Goal: Remain free from fall Interventions: -Fall precautions, use of walker   - See additional Care Plan goals for specific interventions  Outcome: Progressing     Problem: CARDIOVASCULAR - ADULT  Goal: Maintains optimal cardiac output and hemodynamic stability  Description: INTERVENTIONS:- Monitor vital signs, rhythm, and trends- Monitor for bleeding, hypotension and signs of decreased cardiac output- Evaluate effectiveness of vasoactive medications to optimize hemodynamic stability- Monitor arterial and/or venous puncture sites for bleeding and/or hematoma- Assess quality of pulses, skin color and temperature- Assess for signs of decreased coronary artery perfusion - ex. Angina- Evaluate fluid balance, assess for edema, trend weights  Outcome: Progressing  Goal: Absence of cardiac arrhythmias or at baseline  Description: INTERVENTIONS:- Continuous cardiac monitoring, monitor vital signs, obtain 12 lead EKG if indicated- Evaluate effectiveness of antiarrhythmic and heart rate control medications as  ordered- Initiate emergency measures for life threatening arrhythmias- Monitor electrolytes and administer replacement therapy as ordered  Outcome: Progressing     Problem: METABOLIC/FLUID AND ELECTROLYTES - ADULT  Goal: Electrolytes maintained within normal limits  Description: INTERVENTIONS:- Monitor labs and rhythm and assess patient for signs and symptoms of electrolyte imbalances- Administer electrolyte replacement as ordered- Monitor response to electrolyte replacements, including rhythm and repeat lab results as appropriate- Fluid restriction as ordered- Instruct patient on fluid and nutrition restrictions as appropriate  Outcome: Progressing  Goal: Hemodynamic stability and optimal renal function maintained  Description: INTERVENTIONS:- Monitor labs and assess for signs and symptoms of volume excess or deficit- Monitor intake, output and patient weight- Monitor urine specific gravity, serum osmolarity and serum sodium as indicated or ordered- Monitor response to interventions for patient's volume status, including labs, urine output, blood pressure (other measures as available)- Encourage oral intake as appropriate- Instruct patient on fluid and nutrition restrictions as appropriate  Outcome: Progressing     Problem: SAFETY ADULT - FALL  Goal: Free from fall injury  Description: INTERVENTIONS:- Assess pt frequently for physical needs- Identify cognitive and physical deficits and behaviors that affect risk of falls.- [place] fall precautions as indicated by assessment.- Educate pt/family on patient safety including physical limitations- Instruct pt to call for assistance with activity based on assessment- Modify environment to reduce risk of injury- Provide assistive devices as appropriate- Consider OT/PT consult to assist with strengthening/mobility- Encourage toileting schedule  Outcome: Progressing

## 2025-04-21 NOTE — PROGRESS NOTES
[facility]  part of [facility] Services[facility] Progress Note     Vinny Suman Smith JrDianna Patient Status:  Inpatient    1928 MRN [id number]   Location [facility]5W Attending Arnel Barlow MD   Hosp Day # 3 PCP Suman Perla MD     Chief Complaint:   Chief Complaint   Patient presents with    Fall        Subjective:     Patient seen lying in bed.  Son at bedside.  Seen twice  Overnight events reviewed  Fall overnight  Sleepy and confused  Seen twice today    Objective:      Vital signs:  Vitals:    25 0013 25 0502 25 0820 25 1310   BP: 153/65 144/73 150/77 107/80   BP Location: Right arm Left arm Left arm Left arm   Pulse: 68 84 73 82   Resp: 18 17 16 20   Temp: 98.2 °F (36.8 °C) 98.3 °F (36.8 °C) 98.3 °F (36.8 °C) 98.6 °F (37 °C)   TempSrc: Axillary Axillary Axillary Axillary   SpO2: 94% 90% 92% 93%   Weight:       Height:           Intake/Output Summary (Last 24 hours) at 2025 1356  Last data filed at 2025 1100  Gross per 24 hour   Intake 5753.33 ml   Output 1300 ml   Net 4453.33 ml           Physical Exam:    GENERAL:  Awake and alert, in no acute distress.  HEART:  Regular rhythm, regular rate  LUNGS:  Air entry was minimally decreased.  No increased work of breathing or wheezes   ABDOMEN: Soft and non-tender.    PSYCHIATRIC: Normal mood    Diagnostic Data:    Labs:    Recent Labs   Lab 25  0728 25  0504   WBC 9.9 10.3   HGB 9.8* 10.7*   MCV 83.7 84.1   .0 214.0       Recent Labs   Lab 25  0728 25  0916 25  1137 25  1759 25  0504 25  1418 25  2332 25  0316 25  0743 25  2318 25  0516 25  0820   *  --   --   --  108*  --   --  141*  --   --  103*  --    BUN 26*  --   --   --  18  --   --  27*  --   --  40*  --    CREATSERUM 1.64*  --   --   --  1.44*  --   --  1.94*  --   --  2.53*  --    CA 13.6*   < >  --    < > 14.0*   < > 13.1* 12.6*  12.6*    < > 11.2* 10.8*  10.8* 10.6*   ALB 3.9  --  3.63*  --   --   --   --   --   --   --  3.3  --      --   --   --  141  --   --  143  --   --  143  --    K 3.9  --   --   --  3.0*   < > 4.3 4.4  --   --  3.9  --      --   --   --  101  --   --  107  --   --  112  --    CO2 30.0  --   --   --  33.0*  --   --  29.0  --   --  25.0  --    ALKPHO 111  --   --   --   --   --   --   --   --   --   --   --    AST 43*  --   --   --   --   --   --   --   --   --   --   --    ALT 26  --   --   --   --   --   --   --   --   --   --   --    BILT 0.4  --   --   --   --   --   --   --   --   --   --   --    TP 6.8  --  6.9  --   --   --   --   --   --   --   --   --     < > = values in this interval not displayed.           Estimated Creatinine Clearance: 17.7 mL/min (A) (based on SCr of 2.53 mg/dL (H)).    No results for input(s): \"PTP\", \"INR\" in the last 168 hours.         COVID-19  Lab Results   Component Value Date    COVID19 Not Detected 09/01/2022    COVID19 Detected (A) 06/18/2022    COVID19 Detected (A) 04/12/2022       Pro-Calcitonin  No results for input(s): \"PCT\" in the last 168 hours.    Cardiac  No results for input(s): \"TROP\", \"PBNP\" in the last 168 hours.    Inflammatory Markers  No results for input(s): \"CRP\", \"WILIAN\", \"LDH\", \"DDIMER\" in the last 168 hours.    Culture:  No results found for this visit on 04/18/25.    CT BRAIN OR HEAD (CPT=70450)  Result Date: 4/21/2025  CONCLUSION:   1. Stable 4 mm region of hyperdensity in the left cerebellar hemisphere, which is concerning for a small intraparenchymal hematoma. 2. No new or worsening acute intracranial hemorrhage. 3. No transcortical area of hypoattenuation to suggest an acute infarct. 4. No midline shift, mass effect, or hydrocephalus. 5. Multiple chronic findings are described above.    Dictated by (CST): Corey Carter MD on 4/21/2025 at 7:46 AM     Finalized by (CST): Corey Carter MD on 4/21/2025 at 7:53 AM          CT BRAIN OR HEAD  (CPT=70450)  Result Date: 4/20/2025  CONCLUSION:  1. Small 4 mm high density focus in the left cerebellum is new since comparison head CT from June, 2022. This is concerning for a small left cerebellar hematoma (could relate to recent trauma or a hemorrhagic lacunar infarct) or less likely interval development of a left cerebellar calcification.  No associated mass effect or midline shift.  Suggest continued head CT surveillance to ensure stability/resolution.  This finding was not described in the preliminary report and a message regarding discrepancy was sent to Dr. Copeland (covering for Dr. Weinstein) via perfect serve at the time of dictation. 2. Nonspecific white matter changes involving both cerebral hemispheres that most likely reflect sequelae of chronic microangiopathy. 3. Intracranial atherosclerosis. 4. Nonspecific subtotal opacification of the right mastoid air cells.   A preliminary report was issued by the [facility] teleradiology service. There are no major discrepancies.  elm-remote  Dictated by (CST): Randy Yao MD on 4/20/2025 at 6:58 AM     Finalized by (CST): Randy Yao MD on 4/20/2025 at 7:06 AM          CT CHEST+ABDOMEN+PELVIS(CPT=71250/63353)  Result Date: 4/18/2025  CONCLUSION:   CT CHEST:  1. Ectatic ascending aorta measuring up to 4.5 cm in diameter, which has increased in size when compared to 06/11/2007 when it measured 4.1 cm in diameter. 2. Moderate coronary artery calcifications. 3. Stable benign pulmonary nodules measuring up to 3 mm.  No new or enlarging pulmonary nodule. 4. Scattered peribronchial thickening. 5. Lesser incidental findings described above.  CT ABDOMEN AND PELVIS:  1. Dilated common bile duct measuring up to 15 mm in diameter.  Consider further evaluation with an MRI/MRCP as well as correlation with liver function tests. 2. Distended gallbladder without secondary findings to suggest acute cholecystitis. 3. Indeterminate 9 mm posterior right midpole renal  lesion.  Consider further evaluation with a renal ultrasound. 4. Linear metallic radiodensities in the prostate, which likely reflect brachytherapy seeds. 5. Lesser incidental findings described above.    Dictated by (CST): Corey Carter MD on 4/18/2025 at 5:29 PM     Finalized by (CST): Corey Carter MD on 4/18/2025 at 5:42 PM                    Medications: Scheduled Medications[1]    Assessment & Plan:      Hypercalcemia  -Unclear etiology  -calcium 13.6--> 14--> 12.3--> 11.8  -pth, appropriately low  -vit D is normal   -pthrp spep and imaging ordered  - Repeat calcitonin given  - Continue ivf  -Initial concerns for kidney function and tolerance of zoledronic acid, cleared to give per nephrology.  -CT chest ab pelvis reviewed.  Noted stable, benign pulmonary nodules.  Also noted indeterminate right renal lesion.  - Further evaluation of renal lesion with renal ultrasound.--> several mildly complex cystic lesions   - Continue to monitor  -pth rp, vit 1,25 and spep pending    LUH-> CKD III  -baseline cr 1.26  -now 2.53 despite aggressive hydration      Fall  Lf. Cerebellar hyperdensity  -[place] NS evaluation  -stop xarelto  -repeat CT head in today unchanged  -consideration for follow-up MRI brain w and w/o contrast and follow-up with NS as OP    Paroxysmal Atrial Fibrillation  -Rates currently controlled  -Continue diltiazem for rate control  -Continue chronic anticoagulation with Xarelto  -Telemetry monitoring    HTN  - controlled  - CPM  - Monitor and adjust as needed     HL  -cont lipitor       Plan of care discussed with patient and son at bedside . Discussed management/test result(s) with Rn, endocrinology and nephrology consultants.    Goals of care conversation: Patient is appropriately DNR at his age.  We discussed no current hospitalization hypercalcemia ongoing diagnostics.  During this time Vinny has gotten weaker has periods of confusion we did note that he will need rehab on discharge we also  discussed possible decline.  With [facility] they know they would not want dialysis.  Also discussed at what point would be appropriate to transition to comfort based options.  Plan palliative care evaluation    Quality:  DVT Prophylaxis: hep sq  CODE status: DNR/select  Estimated date of discharge: TBD  Discharge is dependent on: clinical stability    85 minutes spent discussing with other providers, examining patient, obtaining history, reviewing medical records, interpreting and communicating test results/imaging, ordering tests/medications, discussing plan of care and documenting information.      BERHANE HARGROVE MD            This note was prepared using Dragon Medical voice recognition dictation software. As a result errors may occur. When identified these errors have been corrected. While every attempt is made to correct errors during dictation discrepancies may still exist         [1]    amLODIPine  5 mg Oral Daily    atorvastatin  10 mg Oral Daily    dilTIAZem ER  120 mg Oral Daily    levothyroxine  100 mcg Oral Before breakfast    pantoprazole  40 mg Oral Before breakfast

## 2025-04-21 NOTE — PROGRESS NOTES
[facility]  part of [facility]    Progress Note    Vinny Smith Jr. Patient Status:  Inpatient    1928 MRN [id number]   Location [facility]5W Attending Mark Varner MD   Hosp Day # 3 PCP Suman Perla MD       Subjective:   Vinny Smith Jr. is a(n) 89 year old male who I am seeing for hypercalcemia/ [facility]/CKD    Son at bedside. Per son patient more awake and alert today.  Appears lethargic and drowsy.  Oral intake has improved.  Positive external catheter    Objective:   /77 (BP Location: Left arm)   Pulse 73   Temp 98.3 °F (36.8 °C) (Axillary)   Resp 16   Ht 5' 11\" (1.803 m)   Wt 165 lb (74.8 kg)   SpO2 92%   BMI 23.01 kg/m²      Intake/Output Summary (Last 24 hours) at 2025 1145  Last data filed at 2025 1100  Gross per 24 hour   Intake 5803.33 ml   Output 1600 ml   Net 4203.33 ml     Wt Readings from Last 1 Encounters:   25 165 lb (74.8 kg)       Exam  Vital signs: Blood pressure 150/77, pulse 73, temperature 98.3 °F (36.8 °C), temperature source Axillary, resp. rate 16, height 5' 11\" (1.803 m), weight 165 lb (74.8 kg), SpO2 92%.    General: No acute distress.   HEENT: Moist mucous membranes. EOMI. PERRLA  Neck:  No JVD.   Abdomen: Soft, nontender, nondistended.    Neurologic: No focal neurological deficits.  Musculoskeletal: Full range of motion of all extremities.  No swelling noted.      Results:     Recent Labs   Lab 25  0728 25  0504   RBC 3.61* 3.84   HGB 9.8* 10.7*   HCT 30.2* 32.3*   MCV 83.7 84.1   MCH 27.1 27.9   MCHC 32.5 33.1   RDW 14.7 14.5   NEPRELIM 7.62 6.91   WBC 9.9 10.3   .0 214.0         Recent Labs   Lab 25  0504 25  1418 25  2332 25  0316 25  0743 25  2318 25  0516 25  0820   *  --   --  141*  --   --  103*  --    BUN 18  --   --  27*  --   --  40*  --    CREATSERUM 1.44*  --   --  1.94*  --   --  2.53*  --    CA 14.0*   < > 13.1* 12.6*   12.6*   < > 11.2* 10.8*  10.8* 10.6*     --   --  143  --   --  143  --    K 3.0*   < > 4.3 4.4  --   --  3.9  --      --   --  107  --   --  112  --    CO2 33.0*  --   --  29.0  --   --  25.0  --     < > = values in this interval not displayed.          CT BRAIN OR HEAD (CPT=70450)  Result Date: 4/21/2025  CONCLUSION:   1. Stable 4 mm region of hyperdensity in the left cerebellar hemisphere, which is concerning for a small intraparenchymal hematoma. 2. No new or worsening acute intracranial hemorrhage. 3. No transcortical area of hypoattenuation to suggest an acute infarct. 4. No midline shift, mass effect, or hydrocephalus. 5. Multiple chronic findings are described above.    Dictated by (CST): Corey Carter MD on 4/21/2025 at 7:46 AM     Finalized by (CST): Corey Carter MD on 4/21/2025 at 7:53 AM          CT BRAIN OR HEAD (CPT=70450)  Result Date: 4/20/2025  CONCLUSION:  1. Small 4 mm high density focus in the left cerebellum is new since comparison head CT from June, 2022. This is concerning for a small left cerebellar hematoma (could relate to recent trauma or a hemorrhagic lacunar infarct) or less likely interval development of a left cerebellar calcification.  No associated mass effect or midline shift.  Suggest continued head CT surveillance to ensure stability/resolution.  This finding was not described in the preliminary report and a message regarding discrepancy was sent to Dr. Copeland (covering for Dr. Weinstein) via perfect serve at the time of dictation. 2. Nonspecific white matter changes involving both cerebral hemispheres that most likely reflect sequelae of chronic microangiopathy. 3. Intracranial atherosclerosis. 4. Nonspecific subtotal opacification of the right mastoid air cells.   A preliminary report was issued by the [facility] teleradiology service. There are no major discrepancies.  elm-remote  Dictated by (CST): Randy Yao MD on 4/20/2025 at 6:58 AM     Finalized by  (CST): Randy Yao MD on 4/20/2025 at 7:06 AM            Assessment and Plan:   89 y/o M with h/o hypertension, A-fib, CKD 3  ( stable cr)  history of prostate cancer and very active individual   living independently presented to ER after a fall in his garage on 4/18.  Per patient he has been feeling slightly weaker than his usual self.  In ER he was noted to be hypercalcemic at at 13.9.  He denies vitamin intake but does use Tums occasionally.  He was seen by endocrinology and started on IV fluids and calcitonin and we are consulted for bisphosphonate management.  Hypercalcemia workup has also been sent and CT chest abdomen pelvis been done.      Impression:    Hypercalcemia, non intact PTH mediated ( low appropriately) .  Workup reveals normal vitamin D.    PTH RP, vitamin D 1,25 and SPEP is pending.  Reduce IV fluid to 100 mL/h.  S/p zoledronic acid and calcitonin  LUH on CKD stage III: Baseline creatinine 1.26 mg obesity down from August 2024.  Worsening kidney function in last 48 hours with creatinine 2.53 mg/dL today.  Check bladder scan at bedside.  Repeat urine analysis and check urine NA and urine sodium  Hypertension- on amlodipine and dialtiazem  A-fib= on xarelto  Right renal lesion on CT -renal ultrasound noted for several mildly complex cystic lesion. unlikely to be malignant      Discussed with nursing and Dr. Varner. Discussed with son at bedside.  Nursing to call lab to expedite the process of lab results      Connor Yanez MD

## 2025-04-22 PROBLEM — C90.00 MULTIPLE MYELOMA (HCC): Status: ACTIVE | Noted: 2025-04-22

## 2025-04-22 PROBLEM — C90.00 MULTIPLE MYELOMA (HCC): Status: ACTIVE | Noted: 2025-01-01

## 2025-04-22 LAB
ALBUMIN SERPL-MCNC: 3.1 G/DL (ref 3.2–4.8)
ALBUMIN/GLOB SERPL: 1.3 {RATIO} (ref 1–2)
ALP LIVER SERPL-CCNC: 147 U/L (ref 45–117)
ALT SERPL-CCNC: 37 U/L (ref 10–49)
ANION GAP SERPL CALC-SCNC: 8 MMOL/L (ref 0–18)
AST SERPL-CCNC: 37 U/L (ref ?–34)
BASOPHILS # BLD: 0 X10(3) UL (ref 0–0.2)
BASOPHILS NFR BLD: 0 %
BILIRUB SERPL-MCNC: 0.5 MG/DL (ref 0.2–0.9)
BILIRUB UR QL: NEGATIVE
BUN BLD-MCNC: 53 MG/DL (ref 9–23)
BUN/CREAT SERPL: 18.7 (ref 10–20)
CALCIUM BLD-MCNC: 9.7 MG/DL (ref 8.7–10.4)
CHLORIDE SERPL-SCNC: 115 MMOL/L (ref 98–112)
CLARITY UR: CLEAR
CO2 SERPL-SCNC: 24 MMOL/L (ref 21–32)
COLOR UR: COLORLESS
CREAT BLD-MCNC: 2.84 MG/DL (ref 0.7–1.3)
DEPRECATED RDW RBC AUTO: 47.2 FL (ref 35.1–46.3)
EGFRCR SERPLBLD CKD-EPI 2021: 20 ML/MIN/1.73M2 (ref 60–?)
EOSINOPHIL # BLD: 0.19 X10(3) UL (ref 0–0.7)
EOSINOPHIL NFR BLD: 2 %
ERYTHROCYTE [DISTWIDTH] IN BLOOD BY AUTOMATED COUNT: 15.2 % (ref 11–15)
GLOBULIN PLAS-MCNC: 2.4 G/DL (ref 2–3.5)
GLUCOSE BLD-MCNC: 89 MG/DL (ref 70–99)
GLUCOSE UR-MCNC: NORMAL MG/DL
HCT VFR BLD AUTO: 26.2 % (ref 39–53)
HGB BLD-MCNC: 8.5 G/DL (ref 13–17.5)
KETONES UR-MCNC: NEGATIVE MG/DL
LEUKOCYTE ESTERASE UR QL STRIP.AUTO: NEGATIVE
LYMPHOCYTES NFR BLD: 17 %
LYMPHOCYTES NFR BLD: 2.07 X10(3) UL (ref 1–4)
MAGNESIUM SERPL-MCNC: 1.9 MG/DL (ref 1.6–2.6)
MCH RBC QN AUTO: 27.9 PG (ref 26–34)
MCHC RBC AUTO-ENTMCNC: 32.4 G/DL (ref 31–37)
MCV RBC AUTO: 85.9 FL (ref 80–100)
MONOCYTES # BLD: 0.56 X10(3) UL (ref 0.1–1)
MONOCYTES NFR BLD: 6 %
NEUTROPHILS # BLD AUTO: 5.06 X10 (3) UL (ref 1.5–7.7)
NEUTROPHILS NFR BLD: 69 %
NEUTS BAND NFR BLD: 1 %
NEUTS HYPERSEG # BLD: 6.58 X10(3) UL (ref 1.5–7.7)
NITRITE UR QL STRIP.AUTO: NEGATIVE
NRBC BLD MANUAL-RTO: 1 % (ref ?–1)
OSMOLALITY SERPL CALC.SUM OF ELEC: 318 MOSM/KG (ref 275–295)
PH UR: 5.5 [PH] (ref 5–8)
PLATELET # BLD AUTO: 140 10(3)UL (ref 150–450)
PLATELET MORPHOLOGY: NORMAL
POTASSIUM SERPL-SCNC: 3.5 MMOL/L (ref 3.5–5.1)
PROT SERPL-MCNC: 5.5 G/DL (ref 5.7–8.2)
PROT UR-MCNC: NEGATIVE MG/DL
RBC # BLD AUTO: 3.05 X10(6)UL (ref 3.8–5.8)
SODIUM SERPL-SCNC: 147 MMOL/L (ref 136–145)
SP GR UR STRIP: 1.01 (ref 1–1.03)
TOTAL CELLS COUNTED BLD: 100
UROBILINOGEN UR STRIP-ACNC: NORMAL
VARIANT LYMPHS NFR BLD MANUAL: 5 % (ref ?–4)
WBC # BLD AUTO: 9.4 X10(3) UL (ref 4–11)

## 2025-04-22 PROCEDURE — 99233 SBSQ HOSP IP/OBS HIGH 50: CPT | Performed by: INTERNAL MEDICINE

## 2025-04-22 PROCEDURE — 99497 ADVNCD CARE PLAN 30 MIN: CPT | Performed by: HOSPITALIST

## 2025-04-22 PROCEDURE — 99233 SBSQ HOSP IP/OBS HIGH 50: CPT | Performed by: HOSPITALIST

## 2025-04-22 RX ORDER — POTASSIUM CHLORIDE 1.5 G/1.58G
40 POWDER, FOR SOLUTION ORAL ONCE
Status: COMPLETED | OUTPATIENT
Start: 2025-04-22 | End: 2025-04-22

## 2025-04-22 RX ORDER — LORAZEPAM 2 MG/ML
1 INJECTION INTRAMUSCULAR ONCE
Status: COMPLETED | OUTPATIENT
Start: 2025-04-22 | End: 2025-04-22

## 2025-04-22 RX ORDER — POTASSIUM CHLORIDE 1500 MG/1
40 TABLET, EXTENDED RELEASE ORAL ONCE
Status: DISCONTINUED | OUTPATIENT
Start: 2025-04-22 | End: 2025-04-22

## 2025-04-22 NOTE — PLAN OF CARE
A&Ox3. Pt up with lift, fluids infusing, voiding via primofit, tolerating general diet, on room air. Frequent rounding by nursing staff. Safety precautions maintained/call light within reach.      Problem: Patient Centered Care  Goal: Patient preferences are identified and integrated in the patient's plan of care  Description: Interventions:- What would you like us to know as we care for you? From home alone - Provide timely, complete, and accurate information to patient/family- Incorporate patient and family knowledge, values, beliefs, and cultural backgrounds into the planning and delivery of care- Encourage patient/family to participate in care and decision-making at the level they choose- Honor patient and family perspectives and choices  Outcome: Progressing     Problem: Patient/Family Goals  Goal: Patient/Family Long Term Goal  Description: Patient's Long Term Goal: Discharge home Interventions:- IV fluids, monitor labs and vitals, follow MD orders   - See additional Care Plan goals for specific interventions  Outcome: Progressing  Goal: Patient/Family Short Term Goal  Description: Patient's Short Term Goal: Remain free from fall Interventions: -Fall precautions, use of walker   - See additional Care Plan goals for specific interventions  Outcome: Progressing     Problem: CARDIOVASCULAR - ADULT  Goal: Maintains optimal cardiac output and hemodynamic stability  Description: INTERVENTIONS:- Monitor vital signs, rhythm, and trends- Monitor for bleeding, hypotension and signs of decreased cardiac output- Evaluate effectiveness of vasoactive medications to optimize hemodynamic stability- Monitor arterial and/or venous puncture sites for bleeding and/or hematoma- Assess quality of pulses, skin color and temperature- Assess for signs of decreased coronary artery perfusion - ex. Angina- Evaluate fluid balance, assess for edema, trend weights  Outcome: Progressing  Goal: Absence of cardiac arrhythmias or at  baseline  Description: INTERVENTIONS:- Continuous cardiac monitoring, monitor vital signs, obtain 12 lead EKG if indicated- Evaluate effectiveness of antiarrhythmic and heart rate control medications as ordered- Initiate emergency measures for life threatening arrhythmias- Monitor electrolytes and administer replacement therapy as ordered  Outcome: Progressing     Problem: METABOLIC/FLUID AND ELECTROLYTES - ADULT  Goal: Electrolytes maintained within normal limits  Description: INTERVENTIONS:- Monitor labs and rhythm and assess patient for signs and symptoms of electrolyte imbalances- Administer electrolyte replacement as ordered- Monitor response to electrolyte replacements, including rhythm and repeat lab results as appropriate- Fluid restriction as ordered- Instruct patient on fluid and nutrition restrictions as appropriate  Outcome: Progressing  Goal: Hemodynamic stability and optimal renal function maintained  Description: INTERVENTIONS:- Monitor labs and assess for signs and symptoms of volume excess or deficit- Monitor intake, output and patient weight- Monitor urine specific gravity, serum osmolarity and serum sodium as indicated or ordered- Monitor response to interventions for patient's volume status, including labs, urine output, blood pressure (other measures as available)- Encourage oral intake as appropriate- Instruct patient on fluid and nutrition restrictions as appropriate  Outcome: Progressing     Problem: SAFETY ADULT - FALL  Goal: Free from fall injury  Description: INTERVENTIONS:- Assess pt frequently for physical needs- Identify cognitive and physical deficits and behaviors that affect risk of falls.- [place] fall precautions as indicated by assessment.- Educate pt/family on patient safety including physical limitations- Instruct pt to call for assistance with activity based on assessment- Modify environment to reduce risk of injury- Provide assistive devices as appropriate- Consider OT/PT  consult to assist with strengthening/mobility- Encourage toileting schedule  Outcome: Progressing

## 2025-04-22 NOTE — PLAN OF CARE
Problem: Patient Centered Care  Goal: Patient preferences are identified and integrated in the patient's plan of care  Description: Interventions:- What would you like us to know as we care for you? From home alone - Provide timely, complete, and accurate information to patient/family- Incorporate patient and family knowledge, values, beliefs, and cultural backgrounds into the planning and delivery of care- Encourage patient/family to participate in care and decision-making at the level they choose- Honor patient and family perspectives and choices  Outcome: Progressing     Problem: Patient/Family Goals  Goal: Patient/Family Long Term Goal  Description: Patient's Long Term Goal: Discharge home Interventions:- IV fluids, monitor labs and vitals, follow MD orders   - See additional Care Plan goals for specific interventions  Outcome: Progressing  Goal: Patient/Family Short Term Goal  Description: Patient's Short Term Goal: Remain free from fall Interventions: -Fall precautions, use of walker   - See additional Care Plan goals for specific interventions  Outcome: Progressing     Problem: CARDIOVASCULAR - ADULT  Goal: Maintains optimal cardiac output and hemodynamic stability  Description: INTERVENTIONS:- Monitor vital signs, rhythm, and trends- Monitor for bleeding, hypotension and signs of decreased cardiac output- Evaluate effectiveness of vasoactive medications to optimize hemodynamic stability- Monitor arterial and/or venous puncture sites for bleeding and/or hematoma- Assess quality of pulses, skin color and temperature- Assess for signs of decreased coronary artery perfusion - ex. Angina- Evaluate fluid balance, assess for edema, trend weights  Outcome: Progressing  Goal: Absence of cardiac arrhythmias or at baseline  Description: INTERVENTIONS:- Continuous cardiac monitoring, monitor vital signs, obtain 12 lead EKG if indicated- Evaluate effectiveness of antiarrhythmic and heart rate control medications as  ordered- Initiate emergency measures for life threatening arrhythmias- Monitor electrolytes and administer replacement therapy as ordered  Outcome: Progressing     Problem: METABOLIC/FLUID AND ELECTROLYTES - ADULT  Goal: Electrolytes maintained within normal limits  Description: INTERVENTIONS:- Monitor labs and rhythm and assess patient for signs and symptoms of electrolyte imbalances- Administer electrolyte replacement as ordered- Monitor response to electrolyte replacements, including rhythm and repeat lab results as appropriate- Fluid restriction as ordered- Instruct patient on fluid and nutrition restrictions as appropriate  Outcome: Progressing  Goal: Hemodynamic stability and optimal renal function maintained  Description: INTERVENTIONS:- Monitor labs and assess for signs and symptoms of volume excess or deficit- Monitor intake, output and patient weight- Monitor urine specific gravity, serum osmolarity and serum sodium as indicated or ordered- Monitor response to interventions for patient's volume status, including labs, urine output, blood pressure (other measures as available)- Encourage oral intake as appropriate- Instruct patient on fluid and nutrition restrictions as appropriate  Outcome: Progressing     Problem: SAFETY ADULT - FALL  Goal: Free from fall injury  Description: INTERVENTIONS:- Assess pt frequently for physical needs- Identify cognitive and physical deficits and behaviors that affect risk of falls.- [place] fall precautions as indicated by assessment.- Educate pt/family on patient safety including physical limitations- Instruct pt to call for assistance with activity based on assessment- Modify environment to reduce risk of injury- Provide assistive devices as appropriate- Consider OT/PT consult to assist with strengthening/mobility- Encourage toileting schedule  Outcome: Progressing      Monitoring vital signs, stable at this time. No acute changes at this moment. Pain medication provided  as needed. Fall precautions in place- bed alarm on, bed locked in lowest position, call light within reach. Frequent rounding by nursing staff.

## 2025-04-22 NOTE — PROGRESS NOTES
[facility]  part of [facility]    Progress Note    Vinny Smith Jr. Patient Status:  Inpatient    1928 MRN [id number]   Location [facility]5W Attending Mark Varner MD   Hosp Day # 4 PCP Suman Perla MD       Subjective:   Vinny Smith Jr. is a(n) 89 year old male who I am seeing for hypercalcemia/ LUH/CKD    Son at bedside.  Patient more drowsy and sleepy and lethargic today.    Objective:   BP (!) 149/95 (BP Location: Right arm)   Pulse 75   Temp 97.8 °F (36.6 °C) (Axillary)   Resp 18   Ht 5' 11\" (1.803 m)   Wt 165 lb (74.8 kg)   SpO2 93%   BMI 23.01 kg/m²      Intake/Output Summary (Last 24 hours) at 2025 1219  Last data filed at 2025 1109  Gross per 24 hour   Intake 2394.1 ml   Output 2000 ml   Net 394.1 ml     Wt Readings from Last 1 Encounters:   25 165 lb (74.8 kg)       Exam  Vital signs: Blood pressure (!) 149/95, pulse 75, temperature 97.8 °F (36.6 °C), temperature source Axillary, resp. rate 18, height 5' 11\" (1.803 m), weight 165 lb (74.8 kg), SpO2 93%.    General: No acute distress.   HEENT: Moist mucous membranes. EOMI. PERRLA  Neck:  No JVD.   Abdomen: Soft, nontender, nondistended.    Neurologic: No focal neurological deficits.  Musculoskeletal: Full range of motion of all extremities.  No swelling noted.      Results:     Recent Labs   Lab 25  0728 25  0504 25  0528   RBC 3.61* 3.84 3.05*   HGB 9.8* 10.7* 8.5*   HCT 30.2* 32.3* 26.2*   MCV 83.7 84.1 85.9   MCH 27.1 27.9 27.9   MCHC 32.5 33.1 32.4   RDW 14.7 14.5 15.2*   NEPRELIM 7.62 6.91 5.06   WBC 9.9 10.3 9.4   .0 214.0 140.0*         Recent Labs   Lab 25  0316 25  0743 25  0516 25  0820 25  0528   *  --  103*  --  89   BUN 27*  --  40*  --  53*   CREATSERUM 1.94*  --  2.53*  --  2.84*   CA 12.6*  12.6*   < > 10.8*  10.8* 10.6* 9.7     --  143  --  147*   K 4.4  --  3.9  --  3.5     --  112  --   115*   CO2 29.0  --  25.0  --  24.0    < > = values in this interval not displayed.          CT BRAIN OR HEAD (CPT=70450)  Result Date: 4/21/2025  CONCLUSION:   1. Stable 4 mm region of hyperdensity in the left cerebellar hemisphere, which is concerning for a small intraparenchymal hematoma. 2. No new or worsening acute intracranial hemorrhage. 3. No transcortical area of hypoattenuation to suggest an acute infarct. 4. No midline shift, mass effect, or hydrocephalus. 5. Multiple chronic findings are described above.    Dictated by (CST): Corey Carter MD on 4/21/2025 at 7:46 AM     Finalized by (CST): Corey Carter MD on 4/21/2025 at 7:53 AM            Assessment and Plan:   89 y/o M with h/o hypertension, A-fib, CKD 3  ( stable cr)  history of prostate cancer and very active individual   living independently presented to ER after a fall in his garage on 4/18.  Per patient he has been feeling slightly weaker than his usual self.  In ER he was noted to be hypercalcemic at at 13.9.  He denies vitamin intake but does use Tums occasionally.  He was seen by endocrinology and started on IV fluids and calcitonin and we are consulted for bisphosphonate management.  Hypercalcemia workup has also been sent and CT chest abdomen pelvis been done.      Impression:    Hypercalcemia: Secondary to multiple myeloma non intact PTH mediated ( low appropriately) .  Workup reveals normal vitamin D.  PTH RP, vitamin D 1,25 is pending. Cont. fluid to 100 mL/h.  S/p zoledronic acid and calcitonin  LHU on CKD stage III: Likely secondary to myeloma kidney.  Continues worsening of renal function.  Remains nonoliguric.  Baseline creatinine 1.26 from August 2024.  Rescan unremarkable  Hypertension- on amlodipine and dialtiazem  A-fib- on xarelto  Right renal lesion on CT -renal ultrasound noted for several mildly complex cystic lesion. unlikely to be malignant  Multiple myeloma: Monoclonal studies suggestive of IgM lambda.  Patient not a  candidate for treatment given age and poor performance status.  Discussed with family side effects outweighs the benefit      Discussed with nursing and Dr. Varner. Discussed with son at bedside.      On Going Goals of care discussion-palliative care and hospice input noted.  Another goals of care discussion today at 2 PM      Connor Yanez MD

## 2025-04-22 NOTE — PROGRESS NOTES
Goals of care conversation: Patient is appropriately DNR at his age.  We discussed no current hospitalization hypercalcemia ongoing diagnostics.  During this time Vinny has gotten weaker has periods of confusion we did note that he will need rehab on discharge we also discussed possible decline.  With [facility] they know they would not want dialysis.  Discussed  spep results pointing to MM as a cause of above. Also discussed that given dx. It would be appropriate to transition to comfort based options.  Plan follow-up with family at 2pm for further discussions.     Time spent >30 mins re: goals of care discussion

## 2025-04-22 NOTE — PROGRESS NOTES
[facility]  [facility]     Vinny Suman Smith  Patient Status:  Inpatient    1928 MRN [id number]   Location [facility]5W Attending Arnel Barlow MD   Hosp Day # 4 PCP Suman Perla MD     Chief Complaint:   Chief Complaint   Patient presents with    Fall        Subjective:     Patient seen lying in bed.  Son at bedside.  Seen twice  Overnight events reviewed  Fall overnight  Sleepy and confused      Objective:      Vital signs:  Vitals:    25 0028 25 0300 25 0500 25 0845   BP: 129/58  145/77 (!) 149/95   BP Location: Left arm  Left arm Right arm   Pulse: 64 73 76 75   Resp: 18   18   Temp: 98.2 °F (36.8 °C)  98.6 °F (37 °C) 97.8 °F (36.6 °C)   TempSrc: Axillary  Axillary Axillary   SpO2: 97%  95% 93%   Weight:       Height:           Intake/Output Summary (Last 24 hours) at 2025 1113  Last data filed at 2025 1109  Gross per 24 hour   Intake 2394.1 ml   Output 2000 ml   Net 394.1 ml           Physical Exam:    GENERAL: confused and lethargic  HEART:  Regular rhythm, regular rate  LUNGS:  Air entry was minimally decreased.  No increased work of breathing or wheezes   ABDOMEN: Soft and non-tender.    PSYCHIATRIC: Normal mood    Diagnostic Data:    Labs:    Recent Labs   Lab 25  0728 25  0504 25  0528   WBC 9.9 10.3 9.4   HGB 9.8* 10.7* 8.5*   MCV 83.7 84.1 85.9   .0 214.0 140.0*   BAND  --   --  1       Recent Labs   Lab 25  0728 25  0916 25  1137 25  1759 25  0316 25  0743 25  0516 25  0820 25  0528   *  --   --    < > 141*  --  103*  --  89   BUN 26*  --   --    < > 27*  --  40*  --  53*   CREATSERUM 1.64*  --   --    < > 1.94*  --  2.53*  --  2.84*   CA 13.6*   < >  --    < > 12.6*  12.6*   < > 10.8*  10.8* 10.6* 9.7   ALB 3.9  --  3.63*  --   --   --  3.3  --  3.1*     --   --    < > 143  --  143  --   147*   K 3.9  --   --    < > 4.4  --  3.9  --  3.5     --   --    < > 107  --  112  --  115*   CO2 30.0  --   --    < > 29.0  --  25.0  --  24.0   ALKPHO 111  --   --   --   --   --   --   --  147*   AST 43*  --   --   --   --   --   --   --  37*   ALT 26  --   --   --   --   --   --   --  37   BILT 0.4  --   --   --   --   --   --   --  0.5   TP 6.8  --  6.9  --   --   --   --   --  5.5*    < > = values in this interval not displayed.           Estimated Creatinine Clearance: 15.7 mL/min (A) (based on SCr of 2.84 mg/dL (H)).    No results for input(s): \"PTP\", \"INR\" in the last 168 hours.         COVID-19  Lab Results   Component Value Date    COVID19 Not Detected 09/01/2022    COVID19 Detected (A) 06/18/2022    COVID19 Detected (A) 04/12/2022       Pro-Calcitonin  No results for input(s): \"PCT\" in the last 168 hours.    Cardiac  No results for input(s): \"TROP\", \"PBNP\" in the last 168 hours.    Inflammatory Markers  No results for input(s): \"CRP\", \"WILIAN\", \"LDH\", \"DDIMER\" in the last 168 hours.    Culture:  No results found for this visit on 04/18/25.    CT BRAIN OR HEAD (CPT=70450)  Result Date: 4/21/2025  CONCLUSION:   1. Stable 4 mm region of hyperdensity in the left cerebellar hemisphere, which is concerning for a small intraparenchymal hematoma. 2. No new or worsening acute intracranial hemorrhage. 3. No transcortical area of hypoattenuation to suggest an acute infarct. 4. No midline shift, mass effect, or hydrocephalus. 5. Multiple chronic findings are described above.    Dictated by (CST): Corey Carter MD on 4/21/2025 at 7:46 AM     Finalized by (CST): Corey Carter MD on 4/21/2025 at 7:53 AM          CT BRAIN OR HEAD (CPT=70450)  Result Date: 4/20/2025  CONCLUSION:  1. Small 4 mm high density focus in the left cerebellum is new since comparison head CT from June, 2022. This is concerning for a small left cerebellar hematoma (could relate to recent trauma or a hemorrhagic lacunar infarct) or less  likely interval development of a left cerebellar calcification.  No associated mass effect or midline shift.  Suggest continued head CT surveillance to ensure stability/resolution.  This finding was not described in the preliminary report and a message regarding discrepancy was sent to Dr. Copeland (covering for Dr. Weinstein) via perfect serve at the time of dictation. 2. Nonspecific white matter changes involving both cerebral hemispheres that most likely reflect sequelae of chronic microangiopathy. 3. Intracranial atherosclerosis. 4. Nonspecific subtotal opacification of the right mastoid air cells.   A preliminary report was issued by the [facility] [employer] [facility] teleradiology service. There are no major discrepancies.  elm-remote  Dictated by (CST): Randy Yao MD on 4/20/2025 at 6:58 AM     Finalized by (CST): Randy Yao MD on 4/20/2025 at 7:06 AM                    Medications: Scheduled Medications[1]    Assessment & Plan:      Hypercalcemia  -Unclear etiology  -calcium 13.6--> 14--> 12.3--> 11.8->9.7  -pth, appropriately low  -vit D is normal   -pthrp spep and imaging ordered  - Repeat calcitonin given  - Continue ivf  -Initial concerns for kidney function and tolerance of zoledronic acid, cleared to give per nephrology.  -CT chest ab pelvis reviewed.  Noted stable, benign pulmonary nodules.  Also noted indeterminate right renal lesion.  - Further evaluation of renal lesion with renal ultrasound.--> several mildly complex cystic lesions   - Continue to monitor  -pth rp, vit 1,25   -spep--> Multiple myleoma  -d/w family    LUH-> CKD III  -baseline cr 1.26  -now 2.584 despite aggressive hydration  -likely related to multiple myeloma      Fall  Lf. Cerebellar hyperdensity  -idalia NS evaluation  -stop xarelto  -repeat CT head 4/21 unchanged  -consideration for follow-up MRI brain w and w/o contrast and follow-up with NS as OP    Paroxysmal Atrial Fibrillation  -Rates currently controlled  -Continue diltiazem for rate  control  -Continue chronic anticoagulation with Xarelto  -Telemetry monitoring    HTN  - controlled  - CPM  - Monitor and adjust as needed     HL  -cont lipitor       Plan of care discussed with patient and son at bedside . Discussed management/test result(s) with Rn, endocrinology and nephrology consultants.    Goals of care conversation: Patient is appropriately DNR at his age.  We discussed no current hospitalization hypercalcemia ongoing diagnostics.  During this time Vinny has gotten weaker has periods of confusion we did note that he will need rehab on discharge we also discussed possible decline.  With [facility] they know they would not want dialysis.  Also discussed at what point would be appropriate to transition to comfort based options.  Plan palliative care evaluation    Quality:  DVT Prophylaxis: hep sq  CODE status: DNR/select  Estimated date of discharge: TBD  Discharge is dependent on: clinical stability    85 minutes spent discussing with other providers, examining patient, obtaining history, reviewing medical records, interpreting and communicating test results/imaging, ordering tests/medications, discussing plan of care and documenting information.      BERHANE HARGROVE MD            This note was prepared using Dragon Medical voice recognition dictation software. As a result errors may occur. When identified these errors have been corrected. While every attempt is made to correct errors during dictation discrepancies may still exist         [1]    heparin  5,000 Units Subcutaneous Q12H    amLODIPine  5 mg Oral Daily    atorvastatin  10 mg Oral Daily    dilTIAZem ER  120 mg Oral Daily    levothyroxine  100 mcg Oral Before breakfast    pantoprazole  40 mg Oral Before breakfast

## 2025-04-22 NOTE — PROGRESS NOTES
Palliative Care Progress Note    Vinny Smith Jr. Patient Status:  Inpatient    1928 MRN [id number]   Location [facility]5W Attending Mark Varner MD   Hosp Day # 4 PCP Suman Perla MD     Date of Consult: 2025  Patient seen at: [facility]    Reason for Consultation: Consult requested for goals of care and care coordination.    Subjective     S: Some anxiety and confusion overnight. Given 1mg ativan IV. No BM in a few days. Feels ok now.      Review of Systems: Palliative Care symptom needs assessed:     Denies dypsnea.   Denies cough or lightheadedness/dizziness.   No pain issues.   Minimal appetite  Denies n/v   No constipation/diarrhea issues.   No depression or anxiety.      Palliative Care Social History:   Marital Status:    Children: Yes  Living Situation Prior to Admit: Home  Occupational History: Retired  Personal:     Spiritual  Vinny Smith Jr. NA    requested: No    Medical History: obtained from Eko  Past Medical History[1]  Past Surgical History[2]    Family History: obtained from Eko  Family History[3]    Allergies:  Allergies[4]    Medications:   Current Hospital Medications[5]  Prior to Admission Medications[6]      Palliative Performance Scale: 40 %  % Ambulation Activity Level Self-Care Intake Consciousness   100 Full  Normal  No Disease Full Normal Full   90 Full  Normal  Some Disease Full Normal Full   80 Full  Normal w/effort  Some Disease Full Normal or reduced Full   70 Reduced  Can't Perform Job  Some Disease Full Normal or reduced Full   60 Reduced  Can't Perform Hobby   Significant Disease Occ Assist Normal or reduced Full or confused   50 Mainly sit/lie Can't do any work  Extensive Disease Partial Assist Normal or reduced Full or confused   40 Mainly in bed Can't do any work  Extensive Disease Mainly Assist Normal or reduced Full or confused   30 Bed Bound Can't do any work  Extensive Disease Max Assist  Total Care  Reduced  Drowsy/confused   20 Bed Bound Can't do any work  Extensive Disease Max Assist  Total Care Minimal  Drowsy/confused   10 Bed Bound Can't do any work  Extensive Disease Max Assist  Total Care Mouth Care  Drowsy/confused   0 Death        Objective      Vital Signs:  Blood pressure (!) 149/95, pulse 75, temperature 97.8 °F (36.6 °C), temperature source Axillary, resp. rate 18, height 5' 11\" (1.803 m), weight 165 lb (74.8 kg), SpO2 93%.  Body mass index is 23.01 kg/m².  Non-verbal signs of pain present: No    Physical Exam:  General: Alert, awake and in no  apparent respiratory distress.  HEENT: MMM. No obvious focal deficits.   Cardiac: RRR  Lungs: Normal effort on RA  Abdomen: Soft, non-distended  Extremities: + Edema present bilateral upper extremities  Skin: Warm and dry.    Hematology:  Lab Results   Component Value Date    WBC 9.4 04/22/2025    HGB 8.5 (L) 04/22/2025    HCT 26.2 (L) 04/22/2025    .0 (L) 04/22/2025       Coags:  Lab Results   Component Value Date    INR 1.88 (H) 02/17/2022    PTT 39.0 (H) 02/17/2022       Chemistry:  Lab Results   Component Value Date    CREATSERUM 2.84 (H) 04/22/2025    BUN 53 (H) 04/22/2025     (H) 04/22/2025    K 3.5 04/22/2025     (H) 04/22/2025    CO2 24.0 04/22/2025    GLU 89 04/22/2025    CA 9.7 04/22/2025    ALB 3.1 (L) 04/22/2025    ALKPHO 147 (H) 04/22/2025    BILT 0.5 04/22/2025    TP 5.5 (L) 04/22/2025    AST 37 (H) 04/22/2025    ALT 37 04/22/2025    PSA <0.01 11/25/2022    MG 1.9 04/22/2025    PHOS 2.9 04/21/2025    TROP 0.02 12/17/2018       Imaging:  CT BRAIN OR HEAD (CPT=70450)  Result Date: 4/21/2025  CONCLUSION:   1. Stable 4 mm region of hyperdensity in the left cerebellar hemisphere, which is concerning for a small intraparenchymal hematoma. 2. No new or worsening acute intracranial hemorrhage. 3. No transcortical area of hypoattenuation to suggest an acute infarct. 4. No midline shift, mass effect, or hydrocephalus. 5. Multiple  chronic findings are described above.    Dictated by (CST): Corey Carter MD on 4/21/2025 at 7:46 AM     Finalized by (CST): Corey Carter MD on 4/21/2025 at 7:53 AM            Assessment and Recommendations        Hypercalcemia    Stage 3 chronic kidney disease (HCC)    LUH (acute kidney injury)    Traumatic left-sided intracerebral hemorrhage without loss of consciousness (HCC)    Altered mental status      Symptom Management      Agitation  -acute delirium, multifactorial  -would avoid benzodiazepine  -favor use of Norco, or for purely neuropsych, seroquel or olanzapine prn  -recommend OOBTC today, work with PT    Constipation  -sennakot BID  -dulcolax supp prn daily    2.     Advanced Care Planning  A voluntarily discussion and explanation regarding Advance Care Planning (ACP) took place today with patient and son. We discussed the risks vs benefits of life sustaining treatments in the setting of Vinny Smith Jr.'s comorbid medical conditions. Items addressed: patient preferences , code status , health care proxy, change in health status , and end-of-life care plan. This resulted in a better understanding of ACP documentation , a better understanding of pt's expressed wishes/preferences , confirmation of code status , and family discussions to continue privately .   Summary:     Total time dedicated to ACP >46 minutes .       3.     Serious Illness Conversation:   Code Status: DNAR/selective  Vinny was in good health, he did have a fall and small SDH? A few years ago when his wife was hospitalized for cancer complications. She passed away on hospice in a nursing facility. He always maintained that he would never want to be in a rehab or nursing facility like she was.   He is highly independent, swimming at the pool daily, driving, doing chores/[place] etc.   His family says that as he's become acutely ill, he has been asking for them to 'get this over with' 'take him out and shoot him' etc etc.   We  started to discuss hospice care at home, which family understood and was in general agreement with.   Abruptly, we asked him if that's what he wanted, and he said that 'no, it was NOT time for him to go see grandma (in heaven)' and that he wanted to get back to his level of independence.   We asked if he'd be willing to undergo medical testing and treatment and even participate in rehab in order to improve he said 'yes whatever it takes.' He also became delirious and started speaking in difficult to understand terms about 'going through hell' or 'going to hell' etc etc.   PLAN is to provide hospice information, wait to see what how his mental status and kidney function can improve. Family is in universal agreement that they would not want hemodialysis or artificial nutrition. I will follow.    Hospice meeting today.   Vinny was confused overnight but better now. Has not gotten up/walked, I strongly recommend this.   Worsening Cr, goals of care ongoing. Family and patient still amenable to time-limited trial of RESHMA before home hospice for NOW.    Palliative Care Follow Up: Palliative care team will Continue to follow while inpatient    Thank you for allowing Palliative Care services to participate in the care of Vinny Smith Jr..      A total of 50 minutes were spent on this visit, which included all of the following: direct face to face contact, history taking, physical examination, and >50% was spent counseling and coordinating care.    Sebastian Bautista MD  Palliative Care Services  [facility] [phone]    Note to patient:  The 21st Century Cures Act makes medical notes like these available to patients in the interest of transparency. However, be advised this is a medical document. It is intended as peer to peer communication. It is written in medical language and may contain abbreviations or verbiage that are unfamiliar. It may appear blunt or direct. Medical documents are intended to carry relevant  information, facts as evident, and the clinical opinion of the practitioner.           [1]   Past Medical History:   Age-related nuclear cataract of both eyes    Arrhythmia    Contusion of left hip and thigh, initial encounter    Cough    Epistaxis    Floater, vitreous, bilateral    Global amnesia    per N minutes in [place]    Headache    High blood pressure    Hypothyroid    Inguinal hernia    Nocturia    Palpitations    toprol    Perforated ear drum    Physical exam, annual    Prostate cancer (HCC)    seed implant    Pulse irregularity   [2]   Past Surgical History:  Procedure Laterality Date    Colonoscopy      Electrocardiogram, complete  2012    scanned to media tab    Inguinal hernia repair      Inner ear surgery proc unlisted      mastoidectomy   [3]   Family History  Problem Relation Age of Onset    Gastro-Intestinal Disorder Father         diverticulitis    Heart Disease Mother         coronary artery disease (cause of death)    Arthritis Sister     Diabetes Maternal Grandmother     Glaucoma Neg     Macular degeneration Neg    [4]   Allergies  Allergen Reactions    Paxil [Paroxetine] RASH    Sulfacetamide      Other reaction(s): SULFA (SULFONAMIDE ANTIBIOTICS)   [5]   Current Facility-Administered Medications:     heparin (Porcine) 5000 UNIT/ML injection 5,000 Units, 5,000 Units, Subcutaneous, Q12H    amLODIPine (Norvasc) tab 5 mg, 5 mg, Oral, Daily    atorvastatin (Lipitor) tab 10 mg, 10 mg, Oral, Daily    dilTIAZem ER (Dilacor XR) 24 hr cap 120 mg, 120 mg, Oral, Daily    levothyroxine (Synthroid) tab 100 mcg, 100 mcg, Oral, Before breakfast    pantoprazole (Protonix) DR tab 40 mg, 40 mg, Oral, Before breakfast    sodium chloride 0.9% infusion, , Intravenous, Continuous    acetaminophen (Tylenol) tab 650 mg, 650 mg, Oral, Q4H PRN **OR** HYDROcodone-acetaminophen (Norco) 5-325 MG per tab 1 tablet, 1 tablet, Oral, Q4H PRN **OR** HYDROcodone-acetaminophen (Norco) 5-325 MG per tab 2 tablet, 2  tablet, Oral, Q4H PRN    morphINE PF 2 MG/ML injection 1 mg, 1 mg, Intravenous, Q2H PRN **OR** morphINE PF 2 MG/ML injection 2 mg, 2 mg, Intravenous, Q2H PRN **OR** morphINE PF 4 MG/ML injection 4 mg, 4 mg, Intravenous, Q2H PRN    polyethylene glycol (PEG 3350) (Miralax) 17 g oral packet 17 g, 17 g, Oral, Daily PRN    sennosides (Senokot) tab 17.2 mg, 17.2 mg, Oral, Nightly PRN    bisacodyl (Dulcolax) 10 MG rectal suppository 10 mg, 10 mg, Rectal, Daily PRN    ondansetron (Zofran) 4 MG/2ML injection 4 mg, 4 mg, Intravenous, Q6H PRN    metoclopramide (Reglan) 5 mg/mL injection 5 mg, 5 mg, Intravenous, Q8H PRN  [6]   No current outpatient medications on file.

## 2025-04-22 NOTE — PROGRESS NOTES
[facility] Liaison Estela and DAVID Garcia met with the patient and son Jayson at bedside to discuss comfort care. Informational meeting complete. Hospice philosophy, Medicare benefit, and levels of care discussed. All questions answered. They expressed the wish to pursue RESHMA and they will transition to hospice when ready. MD Varner, FRAN Schmitt and BRIAN Delvalle aware.     Estela Weller  Residential Hospice Liaison  [phone] (After-hours)

## 2025-04-23 LAB
ANION GAP SERPL CALC-SCNC: 11 MMOL/L (ref 0–18)
BASOPHILS # BLD AUTO: 0.03 X10(3) UL (ref 0–0.2)
BASOPHILS NFR BLD AUTO: 0.3 %
BUN BLD-MCNC: 55 MG/DL (ref 9–23)
BUN/CREAT SERPL: 22.5 (ref 10–20)
CALCIUM BLD-MCNC: 9.3 MG/DL (ref 8.7–10.4)
CHLORIDE SERPL-SCNC: 116 MMOL/L (ref 98–112)
CO2 SERPL-SCNC: 21 MMOL/L (ref 21–32)
CREAT BLD-MCNC: 2.44 MG/DL (ref 0.7–1.3)
DEPRECATED RDW RBC AUTO: 49.1 FL (ref 35.1–46.3)
EGFRCR SERPLBLD CKD-EPI 2021: 23 ML/MIN/1.73M2 (ref 60–?)
EOSINOPHIL # BLD AUTO: 0.27 X10(3) UL (ref 0–0.7)
EOSINOPHIL NFR BLD AUTO: 3 %
ERYTHROCYTE [DISTWIDTH] IN BLOOD BY AUTOMATED COUNT: 15.3 % (ref 11–15)
GLUCOSE BLD-MCNC: 156 MG/DL (ref 70–99)
HCT VFR BLD AUTO: 29.5 % (ref 39–53)
HGB BLD-MCNC: 9.3 G/DL (ref 13–17.5)
IMM GRANULOCYTES # BLD AUTO: 0.09 X10(3) UL (ref 0–1)
IMM GRANULOCYTES NFR BLD: 1 %
LYMPHOCYTES # BLD AUTO: 2.16 X10(3) UL (ref 1–4)
LYMPHOCYTES NFR BLD AUTO: 24.3 %
MCH RBC QN AUTO: 27.8 PG (ref 26–34)
MCHC RBC AUTO-ENTMCNC: 31.5 G/DL (ref 31–37)
MCV RBC AUTO: 88.1 FL (ref 80–100)
MONOCYTES # BLD AUTO: 1.25 X10(3) UL (ref 0.1–1)
MONOCYTES NFR BLD AUTO: 14 %
NEUTROPHILS # BLD AUTO: 5.1 X10 (3) UL (ref 1.5–7.7)
NEUTROPHILS # BLD AUTO: 5.1 X10(3) UL (ref 1.5–7.7)
NEUTROPHILS NFR BLD AUTO: 57.4 %
OSMOLALITY SERPL CALC.SUM OF ELEC: 324 MOSM/KG (ref 275–295)
PLATELET # BLD AUTO: 155 10(3)UL (ref 150–450)
POTASSIUM SERPL-SCNC: 3.4 MMOL/L (ref 3.5–5.1)
POTASSIUM SERPL-SCNC: 3.7 MMOL/L (ref 3.5–5.1)
RBC # BLD AUTO: 3.35 X10(6)UL (ref 3.8–5.8)
SODIUM SERPL-SCNC: 148 MMOL/L (ref 136–145)
WBC # BLD AUTO: 8.9 X10(3) UL (ref 4–11)

## 2025-04-23 PROCEDURE — 99233 SBSQ HOSP IP/OBS HIGH 50: CPT | Performed by: INTERNAL MEDICINE

## 2025-04-23 NOTE — PLAN OF CARE
Problem: Patient Centered Care  Goal: Patient preferences are identified and integrated in the patient's plan of care  Description: Interventions:- What would you like us to know as we care for you? From home alone - Provide timely, complete, and accurate information to patient/family- Incorporate patient and family knowledge, values, beliefs, and cultural backgrounds into the planning and delivery of care- Encourage patient/family to participate in care and decision-making at the level they choose- Honor patient and family perspectives and choices  Outcome: Progressing     Problem: Patient/Family Goals  Goal: Patient/Family Long Term Goal  Description: Patient's Long Term Goal: Discharge home Interventions:- IV fluids, monitor labs and vitals, follow MD orders   - See additional Care Plan goals for specific interventions  Outcome: Progressing  Goal: Patient/Family Short Term Goal  Description: Patient's Short Term Goal: Remain free from fall Interventions: -Fall precautions, use of walker   - See additional Care Plan goals for specific interventions  Outcome: Progressing     Problem: CARDIOVASCULAR - ADULT  Goal: Maintains optimal cardiac output and hemodynamic stability  Description: INTERVENTIONS:- Monitor vital signs, rhythm, and trends- Monitor for bleeding, hypotension and signs of decreased cardiac output- Evaluate effectiveness of vasoactive medications to optimize hemodynamic stability- Monitor arterial and/or venous puncture sites for bleeding and/or hematoma- Assess quality of pulses, skin color and temperature- Assess for signs of decreased coronary artery perfusion - ex. Angina- Evaluate fluid balance, assess for edema, trend weights  Outcome: Progressing  Goal: Absence of cardiac arrhythmias or at baseline  Description: INTERVENTIONS:- Continuous cardiac monitoring, monitor vital signs, obtain 12 lead EKG if indicated- Evaluate effectiveness of antiarrhythmic and heart rate control medications as  ordered- Initiate emergency measures for life threatening arrhythmias- Monitor electrolytes and administer replacement therapy as ordered  Outcome: Progressing     Problem: METABOLIC/FLUID AND ELECTROLYTES - ADULT  Goal: Electrolytes maintained within normal limits  Description: INTERVENTIONS:- Monitor labs and rhythm and assess patient for signs and symptoms of electrolyte imbalances- Administer electrolyte replacement as ordered- Monitor response to electrolyte replacements, including rhythm and repeat lab results as appropriate- Fluid restriction as ordered- Instruct patient on fluid and nutrition restrictions as appropriate  Outcome: Progressing  Goal: Hemodynamic stability and optimal renal function maintained  Description: INTERVENTIONS:- Monitor labs and assess for signs and symptoms of volume excess or deficit- Monitor intake, output and patient weight- Monitor urine specific gravity, serum osmolarity and serum sodium as indicated or ordered- Monitor response to interventions for patient's volume status, including labs, urine output, blood pressure (other measures as available)- Encourage oral intake as appropriate- Instruct patient on fluid and nutrition restrictions as appropriate  Outcome: Progressing     Problem: SAFETY ADULT - FALL  Goal: Free from fall injury  Description: INTERVENTIONS:- Assess pt frequently for physical needs- Identify cognitive and physical deficits and behaviors that affect risk of falls.- [place] fall precautions as indicated by assessment.- Educate pt/family on patient safety including physical limitations- Instruct pt to call for assistance with activity based on assessment- Modify environment to reduce risk of injury- Provide assistive devices as appropriate- Consider OT/PT consult to assist with strengthening/mobility- Encourage toileting schedule  Outcome: Progressing

## 2025-04-23 NOTE — PROGRESS NOTES
[facility]  part of [facility]ist Progress Note     Vinny Suman Smith  Patient Status:  Inpatient    1928 MRN [id number]   Location [facility]5W Attending Arnel Barlow MD   Hosp Day # 5 PCP Suman Perla MD     Chief Complaint:   Chief Complaint   Patient presents with    Fall        Subjective:     Patient seen lying in bed, in NAD. Alert, confused.  Overnight events reviewed      Objective:      Vital signs:  Vitals:    25 0300 25 0514 25 0911 25 1151   BP:  (!) 161/83 146/74 143/80   BP Location:  Right arm Right arm Right arm   Pulse: 70 74 106 73   Resp:  16 16 18   Temp:  97.7 °F (36.5 °C) 97.8 °F (36.6 °C) 97.6 °F (36.4 °C)   TempSrc:  Oral Oral Oral   SpO2:  96% 97% 97%   Weight:       Height:           Intake/Output Summary (Last 24 hours) at 2025 1348  Last data filed at 2025 1157  Gross per 24 hour   Intake 3678.33 ml   Output 2575 ml   Net 1103.33 ml           Physical Exam:    GENERAL: confused and lethargic  HEART:  Regular rhythm, regular rate  LUNGS:  Air entry was minimally decreased.  No increased work of breathing or wheezes   ABDOMEN: Soft and non-tender.    PSYCHIATRIC: Normal mood    Diagnostic Data:    Labs:    Recent Labs   Lab 25  0728 25  0504 25  0528   WBC 9.9 10.3 9.4   HGB 9.8* 10.7* 8.5*   MCV 83.7 84.1 85.9   .0 214.0 140.0*   BAND  --   --  1       Recent Labs   Lab 25  0728 25  0916 25  1137 25  1759 25  0316 25  0743 25  0516 25  0820 25  0528 25  0511   *  --   --    < > 141*  --  103*  --  89  --    BUN 26*  --   --    < > 27*  --  40*  --  53*  --    CREATSERUM 1.64*  --   --    < > 1.94*  --  2.53*  --  2.84*  --    CA 13.6*   < >  --    < > 12.6*  12.6*   < > 10.8*  10.8* 10.6* 9.7  --    ALB 3.9  --  3.63*  --   --   --  3.3  --  3.1*  --      --   --    < > 143  --  143  --  147*  --     K 3.9  --   --    < > 4.4  --  3.9  --  3.5 3.4*     --   --    < > 107  --  112  --  115*  --    CO2 30.0  --   --    < > 29.0  --  25.0  --  24.0  --    ALKPHO 111  --   --   --   --   --   --   --  147*  --    AST 43*  --   --   --   --   --   --   --  37*  --    ALT 26  --   --   --   --   --   --   --  37  --    BILT 0.4  --   --   --   --   --   --   --  0.5  --    TP 6.8  --  6.9  --   --   --   --   --  5.5*  --     < > = values in this interval not displayed.           Estimated Creatinine Clearance: 15.7 mL/min (A) (based on SCr of 2.84 mg/dL (H)).    No results for input(s): \"PTP\", \"INR\" in the last 168 hours.         COVID-19  Lab Results   Component Value Date    COVID19 Not Detected 09/01/2022    COVID19 Detected (A) 06/18/2022    COVID19 Detected (A) 04/12/2022       Pro-Calcitonin  No results for input(s): \"PCT\" in the last 168 hours.    Cardiac  No results for input(s): \"TROP\", \"PBNP\" in the last 168 hours.    Inflammatory Markers  No results for input(s): \"CRP\", \"WILIAN\", \"LDH\", \"DDIMER\" in the last 168 hours.    Culture:  No results found for this visit on 04/18/25.    CT BRAIN OR HEAD (CPT=70450)  Result Date: 4/21/2025  CONCLUSION:   1. Stable 4 mm region of hyperdensity in the left cerebellar hemisphere, which is concerning for a small intraparenchymal hematoma. 2. No new or worsening acute intracranial hemorrhage. 3. No transcortical area of hypoattenuation to suggest an acute infarct. 4. No midline shift, mass effect, or hydrocephalus. 5. Multiple chronic findings are described above.    Dictated by (CST): Corey Carter MD on 4/21/2025 at 7:46 AM     Finalized by (CST): Corey Carter MD on 4/21/2025 at 7:53 AM                    Medications: Scheduled Medications[1]    Assessment & Plan:      Hypercalcemia  -Unclear etiology  -calcium 13.6--> 14--> 12.3--> 11.8->9.7  -pth, appropriately low  -vit D is normal   -pthrp spep and imaging ordered  - Repeat calcitonin given  - Continue  ivf  -Initial concerns for kidney function and tolerance of zoledronic acid, cleared to give per nephrology.  -CT chest ab pelvis reviewed.  Noted stable, benign pulmonary nodules.  Also noted indeterminate right renal lesion.  - Further evaluation of renal lesion with renal ultrasound.--> several mildly complex cystic lesions   - Continue to monitor  -pth rp, vit 1,25   -spep--> Multiple myleoma  -d/w family    LUH-> CKD III  -baseline cr 1.26  -now 2.8 despite aggressive hydration  -likely related to multiple myeloma    Fall  Lf. Cerebellar hyperdensity  -[place] NS evaluation  -stop xarelto  -repeat CT head 4/21 unchanged  -consideration for follow-up MRI brain w and w/o contrast and follow-up with NS as OP    Paroxysmal Atrial Fibrillation  -Rates currently controlled  -Continue diltiazem for rate control  -Continue chronic anticoagulation with Xarelto  -Telemetry monitoring    HTN  - controlled  - CPM  - Monitor and adjust as needed     HL  -cont lipitor     Dispo: Hospice meeting pending    Plan of care discussed by Dr. Varner with patient and son at bedside . Discussed management/test result(s) with Rn, endocrinology and nephrology consultants.    Goals of care conversation: Patient is appropriately DNR at his age.  We discussed no current hospitalization hypercalcemia ongoing diagnostics.  During this time Vinny has gotten weaker has periods of confusion we did note that he will need rehab on discharge we also discussed possible decline.  With [facility] they know they would not want dialysis.  Also discussed at what point would be appropriate to transition to comfort based options.        Quality:  DVT Prophylaxis: hep sq  CODE status: DNR/select  Estimated date of discharge: TBD  Discharge is dependent on: clinical stability      Mukund Concepcion MD  04/23/25         [1]    heparin  5,000 Units Subcutaneous Q12H    amLODIPine  5 mg Oral Daily    atorvastatin  10 mg Oral Daily    dilTIAZem ER  120 mg Oral Daily     levothyroxine  100 mcg Oral Before breakfast    pantoprazole  40 mg Oral Before breakfast

## 2025-04-23 NOTE — PLAN OF CARE
A&Ox4- forgetful. Pt max asst, voiding via purewick, tolerating diet, on room air. Frequent rounding by nursing staff. Safety precautions maintained/call light within reach.     Problem: Patient Centered Care  Goal: Patient preferences are identified and integrated in the patient's plan of care  Description: Interventions:- What would you like us to know as we care for you? From home alone - Provide timely, complete, and accurate information to patient/family- Incorporate patient and family knowledge, values, beliefs, and cultural backgrounds into the planning and delivery of care- Encourage patient/family to participate in care and decision-making at the level they choose- Honor patient and family perspectives and choices  Outcome: Progressing     Problem: Patient/Family Goals  Goal: Patient/Family Long Term Goal  Description: Patient's Long Term Goal: Discharge home Interventions:- IV fluids, monitor labs and vitals, follow MD orders   - See additional Care Plan goals for specific interventions  Outcome: Progressing  Goal: Patient/Family Short Term Goal  Description: Patient's Short Term Goal: Remain free from fall Interventions: -Fall precautions, use of walker   - See additional Care Plan goals for specific interventions  Outcome: Progressing     Problem: CARDIOVASCULAR - ADULT  Goal: Maintains optimal cardiac output and hemodynamic stability  Description: INTERVENTIONS:- Monitor vital signs, rhythm, and trends- Monitor for bleeding, hypotension and signs of decreased cardiac output- Evaluate effectiveness of vasoactive medications to optimize hemodynamic stability- Monitor arterial and/or venous puncture sites for bleeding and/or hematoma- Assess quality of pulses, skin color and temperature- Assess for signs of decreased coronary artery perfusion - ex. Angina- Evaluate fluid balance, assess for edema, trend weights  Outcome: Progressing  Goal: Absence of cardiac arrhythmias or at baseline  Description:  INTERVENTIONS:- Continuous cardiac monitoring, monitor vital signs, obtain 12 lead EKG if indicated- Evaluate effectiveness of antiarrhythmic and heart rate control medications as ordered- Initiate emergency measures for life threatening arrhythmias- Monitor electrolytes and administer replacement therapy as ordered  Outcome: Progressing     Problem: METABOLIC/FLUID AND ELECTROLYTES - ADULT  Goal: Electrolytes maintained within normal limits  Description: INTERVENTIONS:- Monitor labs and rhythm and assess patient for signs and symptoms of electrolyte imbalances- Administer electrolyte replacement as ordered- Monitor response to electrolyte replacements, including rhythm and repeat lab results as appropriate- Fluid restriction as ordered- Instruct patient on fluid and nutrition restrictions as appropriate  Outcome: Progressing  Goal: Hemodynamic stability and optimal renal function maintained  Description: INTERVENTIONS:- Monitor labs and assess for signs and symptoms of volume excess or deficit- Monitor intake, output and patient weight- Monitor urine specific gravity, serum osmolarity and serum sodium as indicated or ordered- Monitor response to interventions for patient's volume status, including labs, urine output, blood pressure (other measures as available)- Encourage oral intake as appropriate- Instruct patient on fluid and nutrition restrictions as appropriate  Outcome: Progressing     Problem: SAFETY ADULT - FALL  Goal: Free from fall injury  Description: INTERVENTIONS:- Assess pt frequently for physical needs- Identify cognitive and physical deficits and behaviors that affect risk of falls.- [place] fall precautions as indicated by assessment.- Educate pt/family on patient safety including physical limitations- Instruct pt to call for assistance with activity based on assessment- Modify environment to reduce risk of injury- Provide assistive devices as appropriate- Consider OT/PT consult to assist with  strengthening/mobility- Encourage toileting schedule  Outcome: Progressing

## 2025-04-23 NOTE — PROGRESS NOTES
[facility]ia[facility] and [facility] met at bedside with son Jayson and his spouse to discuss comfort care. All questions answered. Son deciding between hospice at home vs. Facility. He will visit with [facility] today and reach out to caregiver agencies.    Estela Weller  [facility]iaison  [phone] (After-hours)

## 2025-04-23 NOTE — TELEPHONE ENCOUNTER
Patients son called back. I informed him that the call was for him to get a hospital follow-up appointment. The patient is still currently hospitalized and he will be placed on hospice and taken home with 24 hr care or taken to a facility to help him.

## 2025-04-23 NOTE — PROGRESS NOTES
[facility]  part of [facility]    Progress Note    Vinny Smith Jr. Patient Status:  Inpatient    1928 MRN [id number]   Location [facility]5W Attending Mark Varner MD   Hosp Day # 5 PCP Suman Perla MD       Subjective:   Vinny Smith Jr. is a(n) 89 year old male who I am seeing for hypercalcemia/ [facility]/CKD    Patient more awake and alert today.  denies any chest pain or shortness of breath.  State ate okay this a.m.    Objective:   /80 (BP Location: Right arm)   Pulse 73   Temp 97.6 °F (36.4 °C) (Oral)   Resp 18   Ht 5' 11\" (1.803 m)   Wt 165 lb (74.8 kg)   SpO2 97%   BMI 23.01 kg/m²      Intake/Output Summary (Last 24 hours) at 2025 1418  Last data filed at 2025 1404  Gross per 24 hour   Intake 3778.33 ml   Output 2575 ml   Net 1203.33 ml     Wt Readings from Last 1 Encounters:   25 165 lb (74.8 kg)       Exam  Vital signs: Blood pressure 143/80, pulse 73, temperature 97.6 °F (36.4 °C), temperature source Oral, resp. rate 18, height 5' 11\" (1.803 m), weight 165 lb (74.8 kg), SpO2 97%.    General: No acute distress.   HEENT: Moist mucous membranes. EOMI. PERRLA  Neck:  No JVD.   Abdomen: Soft, nontender, nondistended.    Neurologic: No focal neurological deficits.  Musculoskeletal: Full range of motion of all extremities.  No swelling noted.      Results:     Recent Labs   Lab 25  0728 25  0504 25  0528   RBC 3.61* 3.84 3.05*   HGB 9.8* 10.7* 8.5*   HCT 30.2* 32.3* 26.2*   MCV 83.7 84.1 85.9   MCH 27.1 27.9 27.9   MCHC 32.5 33.1 32.4   RDW 14.7 14.5 15.2*   NEPRELIM 7.62 6.91 5.06   WBC 9.9 10.3 9.4   .0 214.0 140.0*         Recent Labs   Lab 25  0316 25  0743 25  0516 25  0820 25  0528 25  0511   *  --  103*  --  89  --    BUN 27*  --  40*  --  53*  --    CREATSERUM 1.94*  --  2.53*  --  2.84*  --    CA 12.6*  12.6*   < > 10.8*  10.8* 10.6* 9.7  --      --   143  --  147*  --    K 4.4  --  3.9  --  3.5 3.4*     --  112  --  115*  --    CO2 29.0  --  25.0  --  24.0  --     < > = values in this interval not displayed.          No results found.      Assessment and Plan:   89 y/o M with h/o hypertension, A-fib, CKD 3  ( stable cr)  history of prostate cancer and very active individual   living independently presented to ER after a fall in his garage on 4/18.  Per patient he has been feeling slightly weaker than his usual self.  In ER he was noted to be hypercalcemic at at 13.9.  He denies vitamin intake but does use Tums occasionally.  He was seen by endocrinology and started on IV fluids and calcitonin and we are consulted for bisphosphonate management.  Hypercalcemia workup has also been sent and CT chest abdomen pelvis been done.      Impression:    Hypercalcemia: Secondary to multiple myeloma. non intact PTH mediated ( low appropriately) .  Workup reveals normal vitamin D.  PTH RP is pending.  vitamin D 1,25 low.  DC IV fluid-calcium within normal limits.  S/p zoledronic acid and calcitonin  LUH on CKD stage III: secondary to myeloma kidney.  Continues worsening of renal function.  Remains nonoliguric.  Baseline creatinine 1.26 from August 2024.  Repeat UA no RBC or protein  Hypertension- on amlodipine and dialtiazem  A-fib- on xarelto  Right renal lesion on CT -renal ultrasound noted for several mildly complex cystic lesion. unlikely to be malignant  Multiple myeloma: Monoclonal studies suggestive of IgM lambda.  Patient not a candidate for treatment given age and poor performance status.  Discussed with family side effects outweighs the benefit      Discussed with nursing a- discussed with son yesterday at bedside    On Going Goals of care discussion-palliative care and hospice input noted.  Hospice meeting today      Connor Yanez MD

## 2025-04-23 NOTE — PROGRESS NOTES
Provider Clarification    Additional information on the significance of the lab value/diagnostic findings.    Hypokalemia due to hypercalcemia    This note is part of the patient's medical record.

## 2025-04-24 PROBLEM — E87.0 HYPERNATREMIA: Status: ACTIVE | Noted: 2025-04-24

## 2025-04-24 PROBLEM — E87.0 HYPERNATREMIA: Status: ACTIVE | Noted: 2025-01-01

## 2025-04-24 LAB
ANION GAP SERPL CALC-SCNC: 10 MMOL/L (ref 0–18)
BUN BLD-MCNC: 58 MG/DL (ref 9–23)
BUN/CREAT SERPL: 22.9 (ref 10–20)
CALCIUM BLD-MCNC: 9.1 MG/DL (ref 8.7–10.4)
CHLORIDE SERPL-SCNC: 115 MMOL/L (ref 98–112)
CO2 SERPL-SCNC: 22 MMOL/L (ref 21–32)
CREAT BLD-MCNC: 2.53 MG/DL (ref 0.7–1.3)
EGFRCR SERPLBLD CKD-EPI 2021: 22 ML/MIN/1.73M2 (ref 60–?)
GLUCOSE BLD-MCNC: 158 MG/DL (ref 70–99)
OSMOLALITY SERPL CALC.SUM OF ELEC: 323 MOSM/KG (ref 275–295)
POTASSIUM SERPL-SCNC: 3.3 MMOL/L (ref 3.5–5.1)
POTASSIUM SERPL-SCNC: 3.8 MMOL/L (ref 3.5–5.1)
PTH-RELATED PEPTIDE: <2 PMOL/L
SODIUM SERPL-SCNC: 147 MMOL/L (ref 136–145)

## 2025-04-24 PROCEDURE — 99233 SBSQ HOSP IP/OBS HIGH 50: CPT | Performed by: INTERNAL MEDICINE

## 2025-04-24 NOTE — CM/SW NOTE
AMANDA followed up on DC planning.     AMANDA received message from Jose from  stating pt's family has changed their mind and would like pt to discharge to [facility] SNF and transition to hospice after SNF    SW sent message and referral to [facility]. [facility] confirmed they can accept    [facility] reserved in aidin. Per Jose once family is ready for Hospice will reach out to [facility]    PLAN: DC to [facility] SNF - pending med reji ATKINS LSW, MSW ext. [phone]

## 2025-04-24 NOTE — PLAN OF CARE
Problem: Patient Centered Care  Goal: Patient preferences are identified and integrated in the patient's plan of care  Description: Interventions:- What would you like us to know as we care for you? From home alone - Provide timely, complete, and accurate information to patient/family- Incorporate patient and family knowledge, values, beliefs, and cultural backgrounds into the planning and delivery of care- Encourage patient/family to participate in care and decision-making at the level they choose- Honor patient and family perspectives and choices  Outcome: Progressing     Problem: Patient/Family Goals  Goal: Patient/Family Long Term Goal  Description: Patient's Long Term Goal: Discharge home Interventions:- IV fluids, monitor labs and vitals, follow MD orders   - See additional Care Plan goals for specific interventions  Outcome: Progressing  Goal: Patient/Family Short Term Goal  Description: Patient's Short Term Goal: Remain free from fall Interventions: -Fall precautions, use of walker   - See additional Care Plan goals for specific interventions  Outcome: Progressing     Problem: CARDIOVASCULAR - ADULT  Goal: Maintains optimal cardiac output and hemodynamic stability  Description: INTERVENTIONS:- Monitor vital signs, rhythm, and trends- Monitor for bleeding, hypotension and signs of decreased cardiac output- Evaluate effectiveness of vasoactive medications to optimize hemodynamic stability- Monitor arterial and/or venous puncture sites for bleeding and/or hematoma- Assess quality of pulses, skin color and temperature- Assess for signs of decreased coronary artery perfusion - ex. Angina- Evaluate fluid balance, assess for edema, trend weights  Outcome: Progressing  Goal: Absence of cardiac arrhythmias or at baseline  Description: INTERVENTIONS:- Continuous cardiac monitoring, monitor vital signs, obtain 12 lead EKG if indicated- Evaluate effectiveness of antiarrhythmic and heart rate control medications as  ordered- Initiate emergency measures for life threatening arrhythmias- Monitor electrolytes and administer replacement therapy as ordered  Outcome: Progressing     Problem: METABOLIC/FLUID AND ELECTROLYTES - ADULT  Goal: Electrolytes maintained within normal limits  Description: INTERVENTIONS:- Monitor labs and rhythm and assess patient for signs and symptoms of electrolyte imbalances- Administer electrolyte replacement as ordered- Monitor response to electrolyte replacements, including rhythm and repeat lab results as appropriate- Fluid restriction as ordered- Instruct patient on fluid and nutrition restrictions as appropriate  Outcome: Progressing  Goal: Hemodynamic stability and optimal renal function maintained  Description: INTERVENTIONS:- Monitor labs and assess for signs and symptoms of volume excess or deficit- Monitor intake, output and patient weight- Monitor urine specific gravity, serum osmolarity and serum sodium as indicated or ordered- Monitor response to interventions for patient's volume status, including labs, urine output, blood pressure (other measures as available)- Encourage oral intake as appropriate- Instruct patient on fluid and nutrition restrictions as appropriate  Outcome: Progressing     Problem: SAFETY ADULT - FALL  Goal: Free from fall injury  Description: INTERVENTIONS:- Assess pt frequently for physical needs- Identify cognitive and physical deficits and behaviors that affect risk of falls.- [place] fall precautions as indicated by assessment.- Educate pt/family on patient safety including physical limitations- Instruct pt to call for assistance with activity based on assessment- Modify environment to reduce risk of injury- Provide assistive devices as appropriate- Consider OT/PT consult to assist with strengthening/mobility- Encourage toileting schedule  Outcome: Progressing

## 2025-04-24 NOTE — PROGRESS NOTES
[facility]  part of [facility]    Progress Note    Vinny Smith Jr. Patient Status:  Inpatient    1928 MRN [id number]   Location [facility]5W Attending Mark Varner MD   Hosp Day # 6 PCP Suman Perla MD       Subjective:   Vinny Smith Jr. is a(n) 89 year old male who I am seeing for hypercalcemia/ LUH/CKD    Patient more awake and alert today.  Eating breakfast.  Son at bedside.  No shortness of breath or chest pain.  Urinating well.    Objective:   /83 (BP Location: Right arm)   Pulse 92   Temp 97.7 °F (36.5 °C) (Oral)   Resp 16   Ht 5' 11\" (1.803 m)   Wt 165 lb (74.8 kg)   SpO2 94%   BMI 23.01 kg/m²      Intake/Output Summary (Last 24 hours) at 2025 1342  Last data filed at 2025 1139  Gross per 24 hour   Intake 470 ml   Output 2550 ml   Net -2080 ml     Wt Readings from Last 1 Encounters:   25 165 lb (74.8 kg)       Exam  Vital signs: Blood pressure 146/83, pulse 92, temperature 97.7 °F (36.5 °C), temperature source Oral, resp. rate 16, height 5' 11\" (1.803 m), weight 165 lb (74.8 kg), SpO2 94%.    General: No acute distress.   HEENT: Moist mucous membranes. EOMI. PERRLA  Neck:  No JVD.   Abdomen: Soft, nontender, nondistended.    Neurologic: No focal neurological deficits.  Musculoskeletal: Full range of motion of all extremities.  No swelling noted.      Results:     Recent Labs   Lab 25  0504 25  0528 25  0518   RBC 3.84 3.05* 3.35*   HGB 10.7* 8.5* 9.3*   HCT 32.3* 26.2* 29.5*   MCV 84.1 85.9 88.1   MCH 27.9 27.9 27.8   MCHC 33.1 32.4 31.5   RDW 14.5 15.2* 15.3*   NEPRELIM 6.91 5.06 5.10   WBC 10.3 9.4 8.9   .0 140.0* 155.0         Recent Labs   Lab 25  0528 25  0511 25  1413 25  1425 25  05   GLU 89  --  156* 158*  --    BUN 53*  --  55* 58*  --    CREATSERUM 2.84*  --  2.44* 2.53*  --    CA 9.7  --  9.3 9.1  --    *  --  148* 147*  --    K 3.5   < > 3.7 3.8 3.3*    *  --  116* 115*  --    CO2 24.0  --  21.0 22.0  --     < > = values in this interval not displayed.          No results found.      Assessment and Plan:   89 y/o M with h/o hypertension, A-fib, CKD 3  ( stable cr)  history of prostate cancer and very active individual   living independently presented to ER after a fall in his garage on 4/18.  Per patient he has been feeling slightly weaker than his usual self.  In ER he was noted to be hypercalcemic at at 13.9.  He denies vitamin intake but does use Tums occasionally.  He was seen by endocrinology and started on IV fluids and calcitonin and we are consulted for bisphosphonate management.  Hypercalcemia workup has also been sent and CT chest abdomen pelvis been done.      Impression:    Hypercalcemia: Now within normal limit.  Secondary to multiple myeloma. non intact PTH mediated ( low appropriately) .  Workup reveals normal vitamin D.  PTHrP is pending.  vitamin D 1,25 low.  Off of IV fluid.  S/p zoledronic acid and calcitonin  LUH on CKD stage III: secondary to myeloma kidney.  Improved creatinine yesterday to 2.44.  Will recheck tomorrow .baseline creatinine 1.26 from August 2024.  Repeat UA no RBC or protein  Hypertension- on amlodipine and dialtiazem  A-fib- on xarelto  Right renal lesion on CT -renal ultrasound noted for several mildly complex cystic lesion. unlikely to be malignant  Multiple myeloma: Monoclonal studies suggestive of IgM lambda.  Patient not a candidate for treatment given age and poor performance status.  Discussed with family side effects outweighs the benefit  Hypernatremia: Off of IV fluid.  Encourage patient to drink more water.  Recheck in a.m.      Discussed with nursing and discussed with son at bedside    On Going Goals of care discussion-palliative care input noted.  Son is touring the facility for inpatient hospice vs home hospice       Connor Yanez MD

## 2025-04-24 NOTE — PROGRESS NOTES
[facility]  part of [facility] Hospitalist Progress Note     Vinny Suman Smith  Patient Status:  Inpatient    1928 MRN [id number]   Location [facility]5W Attending Arnel Barlow MD   Hosp Day # 6 PCP Suman Perla MD     Chief Complaint:   Chief Complaint   Patient presents with    Fall        Subjective:     Patient seen lying in bed, in NAD. Alert, confused.  Overnight events reviewed. Son at bedside, discussion had with son and Dr. Bautista regarding DC planning.       Objective:      Vital signs:  Vitals:    25 2036 25 0526 25 0912 25 1234   BP: 134/79 (!) 163/94 (!) 169/85 146/83   BP Location: Right arm Right arm Right arm Right arm   Pulse: 102 78 93 92   Resp: 18 16 18 16   Temp: 97.3 °F (36.3 °C) 97.3 °F (36.3 °C) 97.8 °F (36.6 °C) 97.7 °F (36.5 °C)   TempSrc: Oral Oral Oral Oral   SpO2: 97% 94% 95% 94%   Weight:       Height:           Intake/Output Summary (Last 24 hours) at 2025 1344  Last data filed at 2025 1139  Gross per 24 hour   Intake 470 ml   Output 2550 ml   Net -2080 ml           Physical Exam:    GENERAL: confused and lethargic  HEART:  Regular rhythm, regular rate  LUNGS:  Air entry was minimally decreased.  No increased work of breathing or wheezes   ABDOMEN: Soft and non-tender.    PSYCHIATRIC: Normal mood    Diagnostic Data:    Labs:    Recent Labs   Lab 25  0504 25  0528 25  0518   WBC 10.3 9.4 8.9   HGB 10.7* 8.5* 9.3*   MCV 84.1 85.9 88.1   .0 140.0* 155.0   BAND  --  1  --        Recent Labs   Lab 25  0728 25  0916 25  1137 25  1759 25  0516 25  0820 25  0528 25  0511 25  1413 25  1425 25  0524   *  --   --    < > 103*  --  89  --  156* 158*  --    BUN 26*  --   --    < > 40*  --  53*  --  55* 58*  --    CREATSERUM 1.64*  --   --    < > 2.53*  --  2.84*  --  2.44* 2.53*  --    CA 13.6*   < >  --    < > 10.8*   10.8*   < > 9.7  --  9.3 9.1  --    ALB 3.9  --  3.63*  --  3.3  --  3.1*  --   --   --   --      --   --    < > 143  --  147*  --  148* 147*  --    K 3.9  --   --    < > 3.9  --  3.5   < > 3.7 3.8 3.3*     --   --    < > 112  --  115*  --  116* 115*  --    CO2 30.0  --   --    < > 25.0  --  24.0  --  21.0 22.0  --    ALKPHO 111  --   --   --   --   --  147*  --   --   --   --    AST 43*  --   --   --   --   --  37*  --   --   --   --    ALT 26  --   --   --   --   --  37  --   --   --   --    BILT 0.4  --   --   --   --   --  0.5  --   --   --   --    TP 6.8  --  6.9  --   --   --  5.5*  --   --   --   --     < > = values in this interval not displayed.           Estimated Creatinine Clearance: 17.7 mL/min (A) (based on SCr of 2.53 mg/dL (H)).    No results for input(s): \"PTP\", \"INR\" in the last 168 hours.         COVID-19  Lab Results   Component Value Date    COVID19 Not Detected 09/01/2022    COVID19 Detected (A) 06/18/2022    COVID19 Detected (A) 04/12/2022       Pro-Calcitonin  No results for input(s): \"PCT\" in the last 168 hours.    Cardiac  No results for input(s): \"TROP\", \"PBNP\" in the last 168 hours.    Inflammatory Markers  No results for input(s): \"CRP\", \"WILIAN\", \"LDH\", \"DDIMER\" in the last 168 hours.    Culture:  No results found for this visit on 04/18/25.    No results found.            Medications: Scheduled Medications[1]    Assessment & Plan:      Hypercalcemia  -2/2 multiple myeloma  -calcium 13.6--> 14--> 12.3--> 11.8->9.7  -pth, appropriately low  -vit D is normal   -pthrp spep and imaging ordered  - Repeat calcitonin given  - Continue ivf  -Initial concerns for kidney function and tolerance of zoledronic acid, cleared to give per nephrology.  -CT chest ab pelvis reviewed.  Noted stable, benign pulmonary nodules.  Also noted indeterminate right renal lesion.  - Further evaluation of renal lesion with renal ultrasound.--> several mildly complex cystic lesions   - Continue to  monitor  -pth rp, vit 1,25   -spep--> Multiple myleoma  -d/w family    LUH-> CKD III  -baseline cr 1.26  -now 2.8 despite aggressive hydration  -likely related to multiple myeloma    Fall  Lf. Cerebellar hyperdensity  -idalia NS evaluation  -stop xarelto  -repeat CT head 4/21 unchanged  -consideration for follow-up MRI brain w and w/o contrast and follow-up with NS as OP    Paroxysmal Atrial Fibrillation  -Rates currently controlled  -Continue diltiazem for rate control  -Continue chronic anticoagulation with Xarelto  -Telemetry monitoring    HTN  - controlled  - CPM  - Monitor and adjust as needed     HL  -cont lipitor     Dispo: Hospice meeting. Home w/ Hospice vs facility    Plan of care discussed by Dr. Varner with patient and son at bedside . Discussed management/test result(s) with Rn, endocrinology and nephrology consultants.    Goals of care conversation: Patient is appropriately DNR at his age.  We discussed no current hospitalization hypercalcemia ongoing diagnostics.  During this time Vinny has gotten weaker has periods of confusion we did note that he will need rehab on discharge we also discussed possible decline.  With [facility] they know they would not want dialysis.  Also discussed at what point would be appropriate to transition to comfort based options.        Quality:  DVT Prophylaxis: hep sq  CODE status: DNR/select  Estimated date of discharge: TBD  Discharge is dependent on: clinical stability      Mukund Concepcion MD  04/24/25         [1]    potassium chloride  40 mEq Intravenous Once    heparin  5,000 Units Subcutaneous Q12H    amLODIPine  5 mg Oral Daily    atorvastatin  10 mg Oral Daily    dilTIAZem ER  120 mg Oral Daily    levothyroxine  100 mcg Oral Before breakfast    pantoprazole  40 mg Oral Before breakfast

## 2025-04-24 NOTE — CM/SW NOTE
LSW and liaison Estela follow up with pt and family. Pt was sitting upright in bed and was alert. Pt's son Jayson and daughter-in-law were at bedside. Family had just toured [facility] in [place] and stated they would like to send pt there for rehab with the plan to transition to hospice when appropriate. LSW instructed family to call [phone] when they are ready and RH will come to the facility to facilitate pt's transition to hospice. LSW informed MD Concepcion, MD Bautista, FRAN Freeman and AMANDA Zendejas of family's decision.      Jose Mendez, FORTINO  [facility]  [id number]

## 2025-04-24 NOTE — PLAN OF CARE
Converted to DNAR comfort    Problem: Patient Centered Care  Goal: Patient preferences are identified and integrated in the patient's plan of care  Description: Interventions:- What would you like us to know as we care for you? From home alone - Provide timely, complete, and accurate information to patient/family- Incorporate patient and family knowledge, values, beliefs, and cultural backgrounds into the planning and delivery of care- Encourage patient/family to participate in care and decision-making at the level they choose- Honor patient and family perspectives and choices  Outcome: Progressing     Problem: Patient/Family Goals  Goal: Patient/Family Long Term Goal  Description: Patient's Long Term Goal: Discharge home Interventions:- IV fluids, monitor labs and vitals, follow MD orders   - See additional Care Plan goals for specific interventions  Outcome: Progressing  Goal: Patient/Family Short Term Goal  Description: Patient's Short Term Goal: Remain free from fall Interventions: -Fall precautions, use of walker   - See additional Care Plan goals for specific interventions  Outcome: Progressing     Problem: CARDIOVASCULAR - ADULT  Goal: Maintains optimal cardiac output and hemodynamic stability  Description: INTERVENTIONS:- Monitor vital signs, rhythm, and trends- Monitor for bleeding, hypotension and signs of decreased cardiac output- Evaluate effectiveness of vasoactive medications to optimize hemodynamic stability- Monitor arterial and/or venous puncture sites for bleeding and/or hematoma- Assess quality of pulses, skin color and temperature- Assess for signs of decreased coronary artery perfusion - ex. Angina- Evaluate fluid balance, assess for edema, trend weights  Outcome: Progressing  Goal: Absence of cardiac arrhythmias or at baseline  Description: INTERVENTIONS:- Continuous cardiac monitoring, monitor vital signs, obtain 12 lead EKG if indicated- Evaluate effectiveness of antiarrhythmic and heart rate  control medications as ordered- Initiate emergency measures for life threatening arrhythmias- Monitor electrolytes and administer replacement therapy as ordered  Outcome: Progressing     Problem: METABOLIC/FLUID AND ELECTROLYTES - ADULT  Goal: Electrolytes maintained within normal limits  Description: INTERVENTIONS:- Monitor labs and rhythm and assess patient for signs and symptoms of electrolyte imbalances- Administer electrolyte replacement as ordered- Monitor response to electrolyte replacements, including rhythm and repeat lab results as appropriate- Fluid restriction as ordered- Instruct patient on fluid and nutrition restrictions as appropriate  Outcome: Progressing  Goal: Hemodynamic stability and optimal renal function maintained  Description: INTERVENTIONS:- Monitor labs and assess for signs and symptoms of volume excess or deficit- Monitor intake, output and patient weight- Monitor urine specific gravity, serum osmolarity and serum sodium as indicated or ordered- Monitor response to interventions for patient's volume status, including labs, urine output, blood pressure (other measures as available)- Encourage oral intake as appropriate- Instruct patient on fluid and nutrition restrictions as appropriate  Outcome: Progressing     Problem: SAFETY ADULT - FALL  Goal: Free from fall injury  Description: INTERVENTIONS:- Assess pt frequently for physical needs- Identify cognitive and physical deficits and behaviors that affect risk of falls.- Union fall precautions as indicated by assessment.- Educate pt/family on patient safety including physical limitations- Instruct pt to call for assistance with activity based on assessment- Modify environment to reduce risk of injury- Provide assistive devices as appropriate- Consider OT/PT consult to assist with strengthening/mobility- Encourage toileting schedule  Outcome: Progressing

## 2025-04-24 NOTE — PROGRESS NOTES
Palliative Care Progress Note    Vinny Smith Jr. Patient Status:  Inpatient    1928 MRN [id number]   Location [facility]5W Attending Mark Varner MD   Hosp Day # 6 PCP Suman Perla MD     Date of Consult: 2025  Patient seen at: [facility]    Reason for Consultation: Consult requested for goals of care and care coordination.    Subjective     S: Eating breakfast. Feels ok now. Planning for home hospice.     Review of Systems: Palliative Care symptom needs assessed:     Denies dypsnea.   Denies cough or lightheadedness/dizziness.   No pain issues.   Minimal appetite  Denies n/v   No constipation/diarrhea issues.   No depression or anxiety.      Palliative Care Social History:   Marital Status:    Children: Yes  Living Situation Prior to Admit: Home  Occupational History: Retired  Personal:     Spiritual  Vinny Smith Jr. NA    requested: No    Medical History: obtained from [employer]  Past Medical History[1]  Past Surgical History[2]    Family History: obtained from [employer]  Family History[3]    Allergies:  Allergies[4]    Medications:   Current Hospital Medications[5]  Prior to Admission Medications[6]      Palliative Performance Scale: 40 %  % Ambulation Activity Level Self-Care Intake Consciousness   100 Full  Normal  No Disease Full Normal Full   90 Full  Normal  Some Disease Full Normal Full   80 Full  Normal w/effort  Some Disease Full Normal or reduced Full   70 Reduced  Can't Perform Job  Some Disease Full Normal or reduced Full   60 Reduced  Can't Perform Hobby   Significant Disease Occ Assist Normal or reduced Full or confused   50 Mainly sit/lie Can't do any work  Extensive Disease Partial Assist Normal or reduced Full or confused   40 Mainly in bed Can't do any work  Extensive Disease Mainly Assist Normal or reduced Full or confused   30 Bed Bound Can't do any work  Extensive Disease Max Assist  Total Care Reduced  Drowsy/confused   20 Bed  Bound Can't do any work  Extensive Disease Max Assist  Total Care Minimal  Drowsy/confused   10 Bed Bound Can't do any work  Extensive Disease Max Assist  Total Care Mouth Care  Drowsy/confused   0 Death        Objective      Vital Signs:  Blood pressure (!) 169/85, pulse 93, temperature 97.8 °F (36.6 °C), temperature source Oral, resp. rate 18, height 5' 11\" (1.803 m), weight 165 lb (74.8 kg), SpO2 95%.  Body mass index is 23.01 kg/m².  Non-verbal signs of pain present: No    Physical Exam:  General: Alert, awake and in no  apparent respiratory distress.  HEENT: MMM. No obvious focal deficits.   Cardiac: RRR  Lungs: Normal effort on RA  Abdomen: Soft, non-distended  Extremities: + Edema present bilateral upper extremities  Skin: Warm and dry.    Hematology:  Lab Results   Component Value Date    WBC 8.9 04/23/2025    HGB 9.3 (L) 04/23/2025    HCT 29.5 (L) 04/23/2025    .0 04/23/2025       Coags:  Lab Results   Component Value Date    INR 1.88 (H) 02/17/2022    PTT 39.0 (H) 02/17/2022       Chemistry:  Lab Results   Component Value Date    CREATSERUM 2.53 (H) 04/23/2025    BUN 58 (H) 04/23/2025     (H) 04/23/2025    K 3.3 (L) 04/24/2025     (H) 04/23/2025    CO2 22.0 04/23/2025     (H) 04/23/2025    CA 9.1 04/23/2025    ALB 3.1 (L) 04/22/2025    ALKPHO 147 (H) 04/22/2025    BILT 0.5 04/22/2025    TP 5.5 (L) 04/22/2025    AST 37 (H) 04/22/2025    ALT 37 04/22/2025    PSA <0.01 11/25/2022    MG 1.9 04/22/2025    PHOS 2.9 04/21/2025    TROP 0.02 12/17/2018       Imaging:  No results found.      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     Advanced Care Planning  A voluntarily discussion and explanation regarding Advance Care Planning (ACP) took place today with patient and son. We discussed the risks vs benefits of life sustaining treatments in the setting of Vinny Smith Jr.'s comorbid medical conditions. Items addressed: patient preferences , code status , health care proxy, change in health status , and end-of-life care plan. This resulted in a better understanding of ACP documentation , a better understanding of pt's expressed wishes/preferences , confirmation of code status , and family discussions to continue privately .   Summary:     Total time dedicated to ACP >46 minutes .       3.     Serious Illness Conversation:   Code Status: DNAR/selective  Vinny was in good health, he did have a fall and small SDH? A few years ago when his wife was hospitalized for cancer complications. She passed away on hospice in a nursing facility. He always maintained that he would never want to be in a rehab or nursing facility like she was.   He is highly independent, swimming at the pool daily, driving, doing chores/[place] etc.   His family says that as he's become acutely ill, he has been asking for them to 'get this over with' 'take him out and shoot him' etc etc.   We started to discuss hospice care at home, which family understood and was in general agreement with.   Abruptly, we asked him if that's what he wanted, and he said that 'no, it was NOT time for him to go see grandma (in [facility])' and that he wanted to get back to his level of independence.   We asked if he'd be willing to undergo medical testing and treatment and even participate in rehab in order to improve he said 'yes whatever it takes.' He also became delirious and started speaking in difficult to understand terms about 'going through hell' or 'going to hell' etc etc.   PLAN is  to provide hospice information, wait to see what how his mental status and kidney function can improve. Family is in universal agreement that they would not want hemodialysis or artificial nutrition. I will follow.    Hospice meeting today.   Vinny was confused overnight but better now. Has not gotten up/walked, I strongly recommend this.   Worsening Cr, goals of care ongoing. Family and patient still amenable to time-limited trial of RESHMA before home hospice for NOW.  Jayson is touring facilities. Plan on Vinny going home vs facility with hospice.     Palliative Care Follow Up: Palliative care team will Continue to follow while inpatient    Thank you for allowing Palliative Care services to participate in the care of Vinny Smith Jr..      A total of 50 minutes were spent on this visit, which included all of the following: direct face to face contact, history taking, physical examination, and >50% was spent counseling and coordinating care.    Sebastian Bautista MD  Palliative Care Services  [facility] [phone]    Note to patient:  The  Cures Act makes medical notes like these available to patients in the interest of transparency. However, be advised this is a medical document. It is intended as peer to peer communication. It is written in medical language and may contain abbreviations or verbiage that are unfamiliar. It may appear blunt or direct. Medical documents are intended to carry relevant information, facts as evident, and the clinical opinion of the practitioner.           [1]   Past Medical History:   Age-related nuclear cataract of both eyes    Arrhythmia    Contusion of left hip and thigh, initial encounter    Cough    Epistaxis    Floater, vitreous, bilateral    Global amnesia    per N minutes in [place]    Headache    High blood pressure    Hypothyroid    Inguinal hernia    Nocturia    Palpitations    toprol    Perforated ear drum    Physical exam, annual    Prostate cancer  (HCC)    seed implant    Pulse irregularity   [2]   Past Surgical History:  Procedure Laterality Date    Colonoscopy      Electrocardiogram, complete  08-    scanned to media tab    Inguinal hernia repair      Inner ear surgery proc unlisted      mastoidectomy   [3]   Family History  Problem Relation Age of Onset    Gastro-Intestinal Disorder Father         diverticulitis    Heart Disease Mother         coronary artery disease (cause of death)    Arthritis Sister     Diabetes Maternal Grandmother     Glaucoma Neg     Macular degeneration Neg    [4]   Allergies  Allergen Reactions    Paxil [Paroxetine] RASH    Sulfacetamide      Other reaction(s): SULFA (SULFONAMIDE ANTIBIOTICS)   [5]   Current Facility-Administered Medications:     potassium chloride 40 mEq in 250mL sodium chloride 0.9% IVPB premix, 40 mEq, Intravenous, Once    heparin (Porcine) 5000 UNIT/ML injection 5,000 Units, 5,000 Units, Subcutaneous, Q12H    amLODIPine (Norvasc) tab 5 mg, 5 mg, Oral, Daily    atorvastatin (Lipitor) tab 10 mg, 10 mg, Oral, Daily    dilTIAZem ER (Dilacor XR) 24 hr cap 120 mg, 120 mg, Oral, Daily    levothyroxine (Synthroid) tab 100 mcg, 100 mcg, Oral, Before breakfast    pantoprazole (Protonix) DR tab 40 mg, 40 mg, Oral, Before breakfast    acetaminophen (Tylenol) tab 650 mg, 650 mg, Oral, Q4H PRN **OR** HYDROcodone-acetaminophen (Norco) 5-325 MG per tab 1 tablet, 1 tablet, Oral, Q4H PRN **OR** HYDROcodone-acetaminophen (Norco) 5-325 MG per tab 2 tablet, 2 tablet, Oral, Q4H PRN    morphINE PF 2 MG/ML injection 1 mg, 1 mg, Intravenous, Q2H PRN **OR** morphINE PF 2 MG/ML injection 2 mg, 2 mg, Intravenous, Q2H PRN **OR** morphINE PF 4 MG/ML injection 4 mg, 4 mg, Intravenous, Q2H PRN    polyethylene glycol (PEG 3350) (Miralax) 17 g oral packet 17 g, 17 g, Oral, Daily PRN    sennosides (Senokot) tab 17.2 mg, 17.2 mg, Oral, Nightly PRN    bisacodyl (Dulcolax) 10 MG rectal suppository 10 mg, 10 mg, Rectal, Daily PRN     ondansetron (Zofran) 4 MG/2ML injection 4 mg, 4 mg, Intravenous, Q6H PRN    metoclopramide (Reglan) 5 mg/mL injection 5 mg, 5 mg, Intravenous, Q8H PRN  [6]   No current outpatient medications on file.

## 2025-04-25 VITALS
WEIGHT: 165 LBS | DIASTOLIC BLOOD PRESSURE: 83 MMHG | SYSTOLIC BLOOD PRESSURE: 125 MMHG | HEART RATE: 83 BPM | BODY MASS INDEX: 23.1 KG/M2 | RESPIRATION RATE: 20 BRPM | HEIGHT: 71 IN | OXYGEN SATURATION: 98 % | TEMPERATURE: 98 F

## 2025-04-25 LAB
ANION GAP SERPL CALC-SCNC: 8 MMOL/L (ref 0–18)
BUN BLD-MCNC: 49 MG/DL (ref 9–23)
BUN/CREAT SERPL: 19.8 (ref 10–20)
CALCIUM BLD-MCNC: 8.8 MG/DL (ref 8.7–10.4)
CHLORIDE SERPL-SCNC: 114 MMOL/L (ref 98–112)
CO2 SERPL-SCNC: 24 MMOL/L (ref 21–32)
CREAT BLD-MCNC: 2.47 MG/DL (ref 0.7–1.3)
EGFRCR SERPLBLD CKD-EPI 2021: 23 ML/MIN/1.73M2 (ref 60–?)
GLUCOSE BLD-MCNC: 95 MG/DL (ref 70–99)
OSMOLALITY SERPL CALC.SUM OF ELEC: 315 MOSM/KG (ref 275–295)
POTASSIUM SERPL-SCNC: 3.3 MMOL/L (ref 3.5–5.1)
POTASSIUM SERPL-SCNC: 3.3 MMOL/L (ref 3.5–5.1)
SODIUM SERPL-SCNC: 146 MMOL/L (ref 136–145)

## 2025-04-25 PROCEDURE — 99233 SBSQ HOSP IP/OBS HIGH 50: CPT | Performed by: INTERNAL MEDICINE

## 2025-04-25 PROCEDURE — 99239 HOSP IP/OBS DSCHRG MGMT >30: CPT | Performed by: INTERNAL MEDICINE

## 2025-04-25 RX ORDER — POTASSIUM CHLORIDE 1500 MG/1
40 TABLET, EXTENDED RELEASE ORAL ONCE
Status: COMPLETED | OUTPATIENT
Start: 2025-04-25 | End: 2025-04-25

## 2025-04-25 RX ORDER — HYDROCODONE BITARTRATE AND ACETAMINOPHEN 5; 325 MG/1; MG/1
1 TABLET ORAL EVERY 4 HOURS PRN
Qty: 20 TABLET | Refills: 0 | Status: SHIPPED | OUTPATIENT
Start: 2025-04-25

## 2025-04-25 NOTE — PLAN OF CARE
Patient medically cleared for discharge to [facility].   [facility] arranged for transportation.   Discharge summary reviewed with patient and patient's son, Jayson at bedside.   Both verbalized understanding.   All personal items sent home with patient.   Discharge packet (transition of care/printed scripts) provided to .   RN report called and given to FRAN Ayala at [facility].     Problem: Patient Centered Care  Goal: Patient preferences are identified and integrated in the patient's plan of care  Description: Interventions:- What would you like us to know as we care for you? From home alone - Provide timely, complete, and accurate information to patient/family- Incorporate patient and family knowledge, values, beliefs, and cultural backgrounds into the planning and delivery of care- Encourage patient/family to participate in care and decision-making at the level they choose- Honor patient and family perspectives and choices  Outcome: Adequate for Discharge     Problem: Patient/Family Goals  Goal: Patient/Family Long Term Goal  Description: Patient's Long Term Goal: Discharge home Interventions:- IV fluids, monitor labs and vitals, follow MD orders   - See additional Care Plan goals for specific interventions  Outcome: Adequate for Discharge  Goal: Patient/Family Short Term Goal  Description: Patient's Short Term Goal: Remain free from fall Interventions: -Fall precautions, use of walker   - See additional Care Plan goals for specific interventions  Outcome: Adequate for Discharge     Problem: CARDIOVASCULAR - ADULT  Goal: Maintains optimal cardiac output and hemodynamic stability  Description: INTERVENTIONS:- Monitor vital signs, rhythm, and trends- Monitor for bleeding, hypotension and signs of decreased cardiac output- Evaluate effectiveness of vasoactive medications to optimize hemodynamic stability- Monitor arterial and/or venous puncture sites for bleeding and/or hematoma-  Assess quality of pulses, skin color and temperature- Assess for signs of decreased coronary artery perfusion - ex. Angina- Evaluate fluid balance, assess for edema, trend weights  Outcome: Adequate for Discharge  Goal: Absence of cardiac arrhythmias or at baseline  Description: INTERVENTIONS:- Continuous cardiac monitoring, monitor vital signs, obtain 12 lead EKG if indicated- Evaluate effectiveness of antiarrhythmic and heart rate control medications as ordered- Initiate emergency measures for life threatening arrhythmias- Monitor electrolytes and administer replacement therapy as ordered  Outcome: Adequate for Discharge     Problem: METABOLIC/FLUID AND ELECTROLYTES - ADULT  Goal: Electrolytes maintained within normal limits  Description: INTERVENTIONS:- Monitor labs and rhythm and assess patient for signs and symptoms of electrolyte imbalances- Administer electrolyte replacement as ordered- Monitor response to electrolyte replacements, including rhythm and repeat lab results as appropriate- Fluid restriction as ordered- Instruct patient on fluid and nutrition restrictions as appropriate  Outcome: Adequate for Discharge  Goal: Hemodynamic stability and optimal renal function maintained  Description: INTERVENTIONS:- Monitor labs and assess for signs and symptoms of volume excess or deficit- Monitor intake, output and patient weight- Monitor urine specific gravity, serum osmolarity and serum sodium as indicated or ordered- Monitor response to interventions for patient's volume status, including labs, urine output, blood pressure (other measures as available)- Encourage oral intake as appropriate- Instruct patient on fluid and nutrition restrictions as appropriate  Outcome: Adequate for Discharge     Problem: SAFETY ADULT - FALL  Goal: Free from fall injury  Description: INTERVENTIONS:- Assess pt frequently for physical needs- Identify cognitive and physical deficits and behaviors that affect risk of falls.-  [place] fall precautions as indicated by assessment.- Educate pt/family on patient safety including physical limitations- Instruct pt to call for assistance with activity based on assessment- Modify environment to reduce risk of injury- Provide assistive devices as appropriate- Consider OT/PT consult to assist with strengthening/mobility- Encourage toileting schedule  INTERVENTIONS:- Assess pt frequently for physical needs- Identify cognitive and physical deficits and behaviors that affect risk of falls.- [place] fall precautions as indicated by assessment.- Educate pt/family on patient safety including physical limitations- Instruct pt to call for assistance with activity based on assessment- Modify environment to reduce risk of injury- Provide assistive devices as appropriate- Consider OT/PT consult to assist with strengthening/mobility- Encourage toileting schedule  Outcome: Adequate for Discharge     Problem: PAIN - ADULT  Goal: Verbalizes/displays adequate comfort level or patient's stated pain goal  Description: INTERVENTIONS:- Encourage pt to monitor pain and request assistance- Assess pain using appropriate pain scale- Administer analgesics based on type and severity of pain and evaluate response- Implement non-pharmacological measures as appropriate and evaluate response- Consider cultural and social influences on pain and pain management- Manage/alleviate anxiety- Utilize distraction and/or relaxation techniques- Monitor for opioid side effects- Notify MD/LIP if interventions unsuccessful or patient reports new pain- Anticipate increased pain with activity and pre-medicate as appropriate  Outcome: Adequate for Discharge     Problem: RISK FOR INFECTION - ADULT  Goal: Absence of fever/infection during anticipated neutropenic period  Description: INTERVENTIONS- Monitor WBC- Administer growth factors as ordered- Implement neutropenic guidelines  Outcome: Adequate for Discharge     Problem: DISCHARGE  PLANNING  Goal: Discharge to home or other facility with appropriate resources  Description: INTERVENTIONS:- Identify barriers to discharge w/pt and caregiver- Include patient/family/discharge partner in discharge planning- Arrange for needed discharge resources and transportation as appropriate- Identify discharge learning needs (meds, wound care, etc)- Arrange for interpreters to assist at discharge as needed- Consider post-discharge preferences of patient/family/discharge partner- Complete POLST form as appropriate- Assess patient's ability to be responsible for managing their own health- Refer to Case Management Department for coordinating discharge planning if the patient needs post-hospital services based on physician/LIP order or complex needs related to functional status, cognitive ability or social support system  Outcome: Adequate for Discharge

## 2025-04-25 NOTE — CM/SW NOTE
04/25/25 1000   Discharge disposition   Expected discharge disposition subacute   Post Acute Care Provider [facility]   Discharge transportation [facility]     Ambulance arriving at 1pm.  Phone is [phone].  Son is aware of dc time and has contact phone number for [facility] when they are ready to go that route.    Lis Kulkarni MBA BSN RN CRRN   RN Case Manager  [phone]

## 2025-04-25 NOTE — DISCHARGE SUMMARY
[facility]  part of [facility]     DISCHARGE SUMMARY     Vinny Smith Jr. 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 Fib who has been in good shape historically.  He swims for 20 minutes 6 days a week.  He noted feeling weak over the past 6 weeks.  He was doing some work in his garage yesterday and had a fall.  His family notes that he was weaker than his usual self.  He was brought to the ED for evaluation.  His calcium was noted to be 13.9.  He does not take any calcium supplements she does take Tums occasionally but no more than 2 tablets 2-3 times a week.  He does have a history of prostate cancer.   He had a basal cell CA removed from the upper lip several weeks ago.  It was not a Mohs procedure.    HOSPITAL COURSE:  Hypercalcemia  -2/2 multiple myeloma  -calcium 13.6--> 14--> 12.3--> 11.8->9.7  -pth, appropriately low  -vit D is normal   -pthrp spep and imaging ordered  - Repeat calcitonin given  - Continue ivf  -Initial concerns for kidney function and tolerance of zoledronic acid, cleared to give per nephrology.  -CT chest ab pelvis reviewed.  Noted stable, benign pulmonary nodules.  Also noted indeterminate right renal lesion.  - Further evaluation of renal lesion with renal ultrasound.--> several mildly complex cystic lesions   - Continue to monitor  -pth rp,  vit 1,25   -spep--> Multiple myleoma  -d/w family     LUH-> CKD III  -baseline cr 1.26  -now 2.8 despite aggressive hydration  -likely related to multiple myeloma     Fall  Lf. Cerebellar hyperdensity  -idalia NS evaluation  -stop xarelto  -repeat CT head 4/21 unchanged  -consideration for follow-up MRI brain w and w/o contrast and follow-up with NS as OP     Paroxysmal Atrial Fibrillation  -Rates currently controlled  -Continue diltiazem for rate control  -Continue chronic anticoagulation with Xarelto  -Telemetry monitoring     HTN  - controlled  - CPM  - Monitor and adjust as needed     HL  -cont lipitor    Patient understands return to the emergency room for increased pain, fever, discharge, shortness of breath, chest pain, new neurologic symptoms, other concerning symptoms.    PHYSICAL EXAM:  Temp:  [97.5 °F (36.4 °C)-98.7 °F (37.1 °C)] 97.5 °F (36.4 °C)  Pulse:  [] 83  Resp:  [16-20] 20  BP: (125-159)/(74-99) 125/83  SpO2:  [94 %-98 %] 98 %  Gen: A+Ox3.  No distress.   HEENT: NCAT, neck supple, no carotid bruit.  CV: RRR, S1S2, and intact distal pulses. No gallop, rub, murmur.  Pulm: Effort and breath sounds normal. No distress, wheezes, rales, rhonchi.  Abd: Soft, NTND, BS normal, no mass, no HSM, no rebound/guarding.   Neuro: Normal reflexes, CN. Sensory/motor exams grossly normal deficit. Coordination  and gait normal.   MS: No joint effusions.  No peripheral edema.  Skin: Skin is warm and dry. No rashes, erythema, diaphoresis.   Psych: Normal mood and affect. Behavior and judgment normal.     DISCHARGE MEDICATIONS     Discharge Medications        START taking these medications        Instructions Prescription details   HYDROcodone-acetaminophen 5-325 MG Tabs  Commonly known as: Norco      Take 1 tablet by mouth every 4 (four) hours as needed for Pain.   Quantity: 20 tablet  Refills: 0            CONTINUE taking these medications        Instructions Prescription details   amLODIPine 5 MG Tabs  Commonly  known as: Norvasc      Take 1 tablet (5 mg total) by mouth in the morning.   Refills: 0     atorvastatin 10 MG Tabs  Commonly known as: Lipitor      TAKE 1 TABLET BY MOUTH EVERY DAY   Quantity: 90 tablet  Refills: 3     Centrum Silver Tabs      Take 1 tablet by mouth in the morning.   Refills: 0     dilTIAZem  MG Cp24  Commonly known as: Cardizem CD      Take 1 capsule (120 mg total) by mouth daily.   Quantity: 30 capsule  Refills: 1     Imiquimod 5 % Crea       Refills: 0     levothyroxine 100 MCG Tabs  Commonly known as: Synthroid      Take 1 tablet (100 mcg total) by mouth before breakfast.   Quantity: 90 tablet  Refills: 3     pantoprazole 40 MG Tbec  Commonly known as: Protonix      TAKE 1 TABLET BY MOUTH BEFORE BREAKFAST   Quantity: 90 tablet  Refills: 3            STOP taking these medications      amoxicillin 500 MG Caps  Commonly known as: Amoxil        rivaroxaban 15 MG Tabs  Commonly known as: Xarelto                  Where to Get Your Medications        Please  your prescriptions at the location directed by your doctor or nurse    Bring a paper prescription for each of these medications  HYDROcodone-acetaminophen 5-325 MG Tabs         CONSULTANTS  Consultants         Provider   Role Specialty     Nghia Aden MD      Consulting Physician NEUROSURGERY     Mag Valdez MD      Consulting Physician NEPHROLOGY     Mariella Benavidez MD      Consulting Physician ENDOCRINOLOGY            FOLLOW UP:  Polo Mcguire PA-C  [address] [id number] [phone]    Schedule an appointment as soon as possible for a visit in 2 week(s)  with repeat CT head prior to visit. Hold blood thinners until visit.    The above plan and follow-up instructions were reviewed with the patient and they verbalized understanding and agreement.  They understand to return to the emergency room for any concerning signs or symptoms.  Greater than 30 minutes spent on  discharge.  -----------------------      Lace+ Score: 70  59-90 High Risk  29-58 Medium Risk  0-28   Low Risk.    TCM Follow-Up Recommendation:  LACE > 58: High Risk of readmission after discharge from the hospital.  Supplementary Documentation:     Mukund Concepcion MD  4/25/2025

## 2025-04-25 NOTE — PLAN OF CARE
Problem: Patient Centered Care  Goal: Patient preferences are identified and integrated in the patient's plan of care  Description: Interventions:- What would you like us to know as we care for you? From home alone   - Provide timely, complete, and accurate information to patient/family  - Incorporate patient and family knowledge, values, beliefs, and cultural backgrounds into the planning and delivery of care  - Encourage patient/family to participate in care and decision-making at the level they choose  - Honor patient and family perspectives and choices  Outcome: Progressing     Problem: Patient/Family Goals  Goal: Patient/Family Long Term Goal  Description: Patient's Long Term Goal: To discharge from hospital    Interventions:  - Monitor vital signs   - Monitor appropriate labs   - Pain management   - Administer medications per order   - Follow MD orders   - Diagnostics per order   - Update/inform patient and family on plan of care   - Discharge planning    - See additional Care Plan goals for specific interventions  Outcome: Progressing  Goal: Patient/Family Short Term Goal  Description: Patient's Short Term Goal: To remain free of falls   Interventions:   - Monitor vital signs   - Monitor appropriate labs   - Pain management   - Administer medications per order   - Follow MD orders   - Diagnostics per order   - Update/inform patient and family on plan of care   - See additional Care Plan goals for specific interventions  Outcome: Progressing     Problem: CARDIOVASCULAR - ADULT  Goal: Maintains optimal cardiac output and hemodynamic stability  Description: INTERVENTIONS:- Monitor vital signs, rhythm, and trends- Monitor for bleeding, hypotension and signs of decreased cardiac output- Evaluate effectiveness of vasoactive medications to optimize hemodynamic stability- Monitor arterial and/or venous puncture sites for bleeding and/or hematoma- Assess quality of pulses, skin color and temperature- Assess for  signs of decreased coronary artery perfusion - ex. Angina- Evaluate fluid balance, assess for edema, trend weights  Outcome: Progressing  Goal: Absence of cardiac arrhythmias or at baseline  Description: INTERVENTIONS:- Continuous cardiac monitoring, monitor vital signs, obtain 12 lead EKG if indicated- Evaluate effectiveness of antiarrhythmic and heart rate control medications as ordered- Initiate emergency measures for life threatening arrhythmias- Monitor electrolytes and administer replacement therapy as ordered  Outcome: Progressing     Problem: METABOLIC/FLUID AND ELECTROLYTES - ADULT  Goal: Electrolytes maintained within normal limits  Description: INTERVENTIONS:- Monitor labs and rhythm and assess patient for signs and symptoms of electrolyte imbalances- Administer electrolyte replacement as ordered- Monitor response to electrolyte replacements, including rhythm and repeat lab results as appropriate- Fluid restriction as ordered- Instruct patient on fluid and nutrition restrictions as appropriate  Outcome: Progressing  Goal: Hemodynamic stability and optimal renal function maintained  Description: INTERVENTIONS:- Monitor labs and assess for signs and symptoms of volume excess or deficit- Monitor intake, output and patient weight- Monitor urine specific gravity, serum osmolarity and serum sodium as indicated or ordered- Monitor response to interventions for patient's volume status, including labs, urine output, blood pressure (other measures as available)- Encourage oral intake as appropriate- Instruct patient on fluid and nutrition restrictions as appropriate  Outcome: Progressing     Problem: SAFETY ADULT - FALL  Goal: Free from fall injury  Description: INTERVENTIONS:- Assess pt frequently for physical needs- Identify cognitive and physical deficits and behaviors that affect risk of falls.- [place] fall precautions as indicated by assessment.- Educate pt/family on patient safety including physical  limitations- Instruct pt to call for assistance with activity based on assessment- Modify environment to reduce risk of injury- Provide assistive devices as appropriate- Consider OT/PT consult to assist with strengthening/mobility- Encourage toileting schedule  INTERVENTIONS:- Assess pt frequently for physical needs- Identify cognitive and physical deficits and behaviors that affect risk of falls.- [place] fall precautions as indicated by assessment.- Educate pt/family on patient safety including physical limitations- Instruct pt to call for assistance with activity based on assessment- Modify environment to reduce risk of injury- Provide assistive devices as appropriate- Consider OT/PT consult to assist with strengthening/mobility- Encourage toileting schedule  Outcome: Progressing     Problem: PAIN - ADULT  Goal: Verbalizes/displays adequate comfort level or patient's stated pain goal  Description: INTERVENTIONS:- Encourage pt to monitor pain and request assistance- Assess pain using appropriate pain scale- Administer analgesics based on type and severity of pain and evaluate response- Implement non-pharmacological measures as appropriate and evaluate response- Consider cultural and social influences on pain and pain management- Manage/alleviate anxiety- Utilize distraction and/or relaxation techniques- Monitor for opioid side effects- Notify MD/LIP if interventions unsuccessful or patient reports new pain- Anticipate increased pain with activity and pre-medicate as appropriate  Outcome: Progressing     Problem: RISK FOR INFECTION - ADULT  Goal: Absence of fever/infection during anticipated neutropenic period  Description: INTERVENTIONS- Monitor WBC- Administer growth factors as ordered- Implement neutropenic guidelines  Outcome: Progressing     Problem: DISCHARGE PLANNING  Goal: Discharge to home or other facility with appropriate resources  Description: INTERVENTIONS:- Identify barriers to discharge w/pt and  caregiver- Include patient/family/discharge partner in discharge planning- Arrange for needed discharge resources and transportation as appropriate- Identify discharge learning needs (meds, wound care, etc)- Arrange for interpreters to assist at discharge as needed- Consider post-discharge preferences of patient/family/discharge partner- Complete POLST form as appropriate- Assess patient's ability to be responsible for managing their own health- Refer to Case Management Department for coordinating discharge planning if the patient needs post-hospital services based on physician/LIP order or complex needs related to functional status, cognitive ability or social support system  Outcome: Progressing     Monitoring vital signs, stable at this time. No acute changes at this moment. Fall precautions in place- bed alarm on, bed locked in lowest position, call light within reach. Frequent rounding by nursing staff.

## 2025-04-25 NOTE — PROGRESS NOTES
[facility]  part of [facility]    Progress Note    Vinny Smith Jr. Patient Status:  Inpatient    1928 MRN [id number]   Location [facility]5W Attending Mark Varner MD   Hosp Day # 7 PCP Suman Perla MD       Subjective:   Vinny Smith Jr. is a(n) 89 year old male who I am seeing for hypercalcemia/ LUH/CKD    Patient more awake and alert today.  Tolerated breakfast well.  Denies any chest pain or shortness of breath.  No lower extremity swelling    Objective:   /83 (BP Location: Right arm)   Pulse 83   Temp 97.5 °F (36.4 °C) (Oral)   Resp 20   Ht 5' 11\" (1.803 m)   Wt 165 lb (74.8 kg)   SpO2 98%   BMI 23.01 kg/m²      Intake/Output Summary (Last 24 hours) at 2025 1144  Last data filed at 2025 0940  Gross per 24 hour   Intake 1110 ml   Output 2350 ml   Net -1240 ml     Wt Readings from Last 1 Encounters:   25 165 lb (74.8 kg)       Exam  Vital signs: Blood pressure 125/83, pulse 83, temperature 97.5 °F (36.4 °C), temperature source Oral, resp. rate 20, height 5' 11\" (1.803 m), weight 165 lb (74.8 kg), SpO2 98%.    General: No acute distress.   HEENT: Moist mucous membranes. EOMI. PERRLA  Neck:  No JVD.   Abdomen: Soft, nontender, nondistended.    Neurologic: No focal neurological deficits.  Musculoskeletal: Full range of motion of all extremities.  No swelling noted.      Results:     Recent Labs   Lab 25  0504 25  0528 25  0518   RBC 3.84 3.05* 3.35*   HGB 10.7* 8.5* 9.3*   HCT 32.3* 26.2* 29.5*   MCV 84.1 85.9 88.1   MCH 27.9 27.9 27.8   MCHC 33.1 32.4 31.5   RDW 14.5 15.2* 15.3*   NEPRELIM 6.91 5.06 5.10   WBC 10.3 9.4 8.9   .0 140.0* 155.0         Recent Labs   Lab 25  1413 25  1425 25  0524 25  0515   * 158*  --  95   BUN 55* 58*  --  49*   CREATSERUM 2.44* 2.53*  --  2.47*   CA 9.3 9.1  --  8.8   * 147*  --  146*   K 3.7 3.8 3.3* 3.3*  3.3*   * 115*  --  114*    CO2 21.0 22.0  --  24.0          No results found.      Assessment and Plan:   89 y/o M with h/o hypertension, A-fib, CKD 3  ( stable cr)  history of prostate cancer and very active individual   living independently presented to ER after a fall in his garage on 4/18.  Per patient he has been feeling slightly weaker than his usual self.  In ER he was noted to be hypercalcemic at at 13.9.  He denies vitamin intake but does use Tums occasionally.  He was seen by endocrinology and started on IV fluids and calcitonin and we are consulted for bisphosphonate management.  Hypercalcemia workup has also been sent and CT chest abdomen pelvis been done.      Impression:    Hypercalcemia: Now within normal limit.  Secondary to multiple myeloma. non intact PTH mediated ( low appropriately) .  Workup reveals normal vitamin D.  PTHrP wnl.  vitamin D 1,25 low.  Off of IV fluid.  S/p zoledronic acid and calcitonin  LUH on CKD stage III: secondary to myeloma kidney.  Improved creatinine and stable btw 2.45-2.5 mg/dl. baseline creatinine 1.26 from August 2024.  Repeat UA no RBC or protein  Hypertension- on amlodipine and dialtiazem  A-fib- on xarelto  Right renal lesion on CT -renal ultrasound noted for several mildly complex cystic lesion. unlikely to be malignant  Multiple myeloma: Monoclonal studies suggestive of IgM lambda.  Patient not a candidate for treatment given age and poor performance status.  Discussed with family side effects outweighs the benefit  Hypernatremia: Off of IV fluid.  Encourage patient to drink more water.  Stable at 146       Discussed with nursing and Dr. Concepcion     Patient is getting discharged to rehab with plan to transition to hospice       Connor Yanez MD

## 2025-05-28 ENCOUNTER — MED REC SCAN ONLY (OUTPATIENT)
Dept: INTERNAL MEDICINE CLINIC | Facility: CLINIC | Age: OVER 89
End: 2025-05-28

## 2025-06-02 ENCOUNTER — MED REC SCAN ONLY (OUTPATIENT)
Dept: INTERNAL MEDICINE CLINIC | Facility: CLINIC | Age: OVER 89
End: 2025-06-02

## 2025-07-07 ENCOUNTER — TELEPHONE (OUTPATIENT)
Dept: INTERNAL MEDICINE CLINIC | Facility: CLINIC | Age: OVER 89
End: 2025-07-07

## (undated) DIAGNOSIS — I10 PRIMARY HYPERTENSION: ICD-10-CM

## (undated) DIAGNOSIS — Z86.79 HISTORY OF ORTHOSTATIC HYPOTENSION: ICD-10-CM

## (undated) DIAGNOSIS — S70.02XA CONTUSION OF LEFT HIP AND THIGH, INITIAL ENCOUNTER: ICD-10-CM

## (undated) DIAGNOSIS — I70.0 ATHEROSCLEROSIS OF AORTA (HCC): ICD-10-CM

## (undated) DIAGNOSIS — S70.12XA CONTUSION OF LEFT HIP AND THIGH, INITIAL ENCOUNTER: ICD-10-CM

## (undated) DIAGNOSIS — I48.0 PAROXYSMAL ATRIAL FIBRILLATION (HCC): ICD-10-CM

## (undated) DIAGNOSIS — N18.31 STAGE 3A CHRONIC KIDNEY DISEASE (HCC): ICD-10-CM

## (undated) DIAGNOSIS — S06.6X0D SUBARACHNOID HEMORRHAGE FOLLOWING INJURY, CONCUSSION, WITHOUT LOSS OF CONSCIOUSNESS, SUBSEQUENT ENCOUNTER: ICD-10-CM

## (undated) DIAGNOSIS — S06.5X9A SUBDURAL HEMATOMA (HCC): ICD-10-CM

## (undated) NOTE — IP AVS SNAPSHOT
[facility]            (For Outpatient Use Only) Initial Admit Date: 2022   Inpt/Obs Admit Date: Inpt: 22 / Obs: N/A   Discharge Date:    Svetlana Cortes:  [de-identified]   MRN: [de-identified]   CSN: [id number]   CEID: [id number]        ENCOUNTER  Patient Class: Inpatient Admitting Provider: Heavenly Medina MD Unit: [address] [place] [address]   Hospital Service: Cardiac Telemetry Attending Provider: Heavenly Medina MD   Bed: COF13-A   Visit Type:   Referring Physician: No ref. provider found Billing Flag:    Admit Diagnosis: Intracranial hemorrhage [facility]) [I62.9]      PATIENT  Legal Name:   Justin Reynoso   Legal Sex: Male  Gender ID: Male             [address] [place] [address],[address] Name:   [id number] PCP:  Aurelia Yo MD Home: [phone]   Address:  [address] : 1928 (89 yrs) Mobile: [phone]         City/State/Zip: [address] [place] [id number] Marital:  Language: Bonnie Juniper: Maggi SSN4: [id number] Protestant: No Protestant Given-No Stacy*     Race: White Ethnicity: Non  Or [facility]   Name Relationship Legal Guardian? Home Phone Work Phone Mobile Phone   1. Anne Smith  2. Maira  Spouse  Son    [phone]     GUARANTOR  Guarantor: Sourav York : 1928 Home Phone: [phone]   Address: [address]  Sex: Male Work Phone: [phone]   City/State/Zip: [place], [place]   Rel. to Patient: Self Guarantor ID: [id number]   GUARANTOR EMPLOYER   Employer:  Status: RETIRED     COVERAGE  PRIMARY INSURANCE   Payor: MEDICARE Plan: MEDICARE PART A&B   Group Number: [id number] Insurance Type: [address] Name: Naniazalea York Subscriber : 1928   Subscriber ID: [id number] Pt Rel to Subscriber: Self   SECONDARY INSURANCE   Payor[place] Plan: [place]   Group Number: [id number] Insurance Type: [address] Name: Sourav York Subscriber : 1928   Subscriber ID: [id number] Pt Rel to Subscriber: SELF   TERTIARY INSURANCE   Payor:  Plan:    Group Number:  Insurance Type:    Subscriber Name:  Subscriber :    Subscriber ID:  Pt Rel to Subscriber:    Hospital Account Financial Class: Medicare    2022

## (undated) NOTE — LETTER
5/26/2021    Patient: Manuela Young   MR Number: [id number]   YOB: 1928   Date of Visit: 5/26/2021   Physician: Maday Vega MD     Dear Medicare Patient:  Natalio More TO BENEFICIARY:  Please know that while a refraction is i

## (undated) NOTE — MR AVS SNAPSHOT
[facility] [place] [facility] [address] [address]  [address] [phone]               Thank you for choosing us for your health care visit with Erin Avendaño MD.  We are glad to serve you and happy to provide you with this summar [email or web address]. If you've recently had a stay at the Hospital you can access your discharge instructions in [employer] by going to Visits < Admission Summaries.  If you've been to the Emergency Department or your doctor's office, you can view yo